# Patient Record
Sex: FEMALE | Race: WHITE | Employment: OTHER | URBAN - METROPOLITAN AREA
[De-identification: names, ages, dates, MRNs, and addresses within clinical notes are randomized per-mention and may not be internally consistent; named-entity substitution may affect disease eponyms.]

---

## 2017-04-11 ENCOUNTER — APPOINTMENT (OUTPATIENT)
Dept: CT IMAGING | Age: 82
DRG: 395 | End: 2017-04-11
Attending: STUDENT IN AN ORGANIZED HEALTH CARE EDUCATION/TRAINING PROGRAM
Payer: MEDICARE

## 2017-04-11 ENCOUNTER — HOSPITAL ENCOUNTER (INPATIENT)
Age: 82
LOS: 3 days | Discharge: HOME | DRG: 395 | End: 2017-04-14
Attending: STUDENT IN AN ORGANIZED HEALTH CARE EDUCATION/TRAINING PROGRAM | Admitting: FAMILY MEDICINE
Payer: MEDICARE

## 2017-04-11 DIAGNOSIS — K52.9 ACUTE COLITIS: Primary | ICD-10-CM

## 2017-04-11 LAB
ABO + RH BLD: NORMAL
ALBUMIN SERPL BCP-MCNC: 3.5 G/DL (ref 3.5–5)
ALBUMIN/GLOB SERPL: 1.1 {RATIO} (ref 1.1–2.2)
ALP SERPL-CCNC: 88 U/L (ref 45–117)
ALT SERPL-CCNC: 19 U/L (ref 12–78)
ANION GAP BLD CALC-SCNC: 9 MMOL/L (ref 5–15)
APPEARANCE UR: CLEAR
APTT PPP: 28 SEC (ref 22.1–32.5)
AST SERPL W P-5'-P-CCNC: 19 U/L (ref 15–37)
BACTERIA URNS QL MICRO: NEGATIVE /HPF
BASOPHILS # BLD AUTO: 0 K/UL (ref 0–0.1)
BASOPHILS # BLD: 0 % (ref 0–1)
BILIRUB SERPL-MCNC: 0.7 MG/DL (ref 0.2–1)
BILIRUB UR QL: NEGATIVE
BLOOD GROUP ANTIBODIES SERPL: NORMAL
BUN SERPL-MCNC: 14 MG/DL (ref 6–20)
BUN/CREAT SERPL: 18 (ref 12–20)
CALCIUM SERPL-MCNC: 8.9 MG/DL (ref 8.5–10.1)
CHLORIDE SERPL-SCNC: 102 MMOL/L (ref 97–108)
CO2 SERPL-SCNC: 25 MMOL/L (ref 21–32)
COLOR UR: ABNORMAL
CREAT SERPL-MCNC: 0.77 MG/DL (ref 0.55–1.02)
EOSINOPHIL # BLD: 0.2 K/UL (ref 0–0.4)
EOSINOPHIL NFR BLD: 1 % (ref 0–7)
EPITH CASTS URNS QL MICRO: ABNORMAL /LPF
ERYTHROCYTE [DISTWIDTH] IN BLOOD BY AUTOMATED COUNT: 12.7 % (ref 11.5–14.5)
GLOBULIN SER CALC-MCNC: 3.1 G/DL (ref 2–4)
GLUCOSE SERPL-MCNC: 98 MG/DL (ref 65–100)
GLUCOSE UR STRIP.AUTO-MCNC: NEGATIVE MG/DL
HCT VFR BLD AUTO: 38.2 % (ref 35–47)
HGB BLD-MCNC: 12.7 G/DL (ref 11.5–16)
HGB UR QL STRIP: NEGATIVE
INR PPP: 1 (ref 0.9–1.1)
KETONES UR QL STRIP.AUTO: NEGATIVE MG/DL
LACTATE SERPL-SCNC: 1.1 MMOL/L (ref 0.4–2)
LEUKOCYTE ESTERASE UR QL STRIP.AUTO: ABNORMAL
LIPASE SERPL-CCNC: 113 U/L (ref 73–393)
LYMPHOCYTES # BLD AUTO: 16 % (ref 12–49)
LYMPHOCYTES # BLD: 2 K/UL (ref 0.8–3.5)
MCH RBC QN AUTO: 29.8 PG (ref 26–34)
MCHC RBC AUTO-ENTMCNC: 33.2 G/DL (ref 30–36.5)
MCV RBC AUTO: 89.7 FL (ref 80–99)
MONOCYTES # BLD: 0.8 K/UL (ref 0–1)
MONOCYTES NFR BLD AUTO: 7 % (ref 5–13)
NEUTS SEG # BLD: 9.1 K/UL (ref 1.8–8)
NEUTS SEG NFR BLD AUTO: 76 % (ref 32–75)
NITRITE UR QL STRIP.AUTO: NEGATIVE
PH UR STRIP: 6.5 [PH] (ref 5–8)
PLATELET # BLD AUTO: 231 K/UL (ref 150–400)
POTASSIUM SERPL-SCNC: 3.4 MMOL/L (ref 3.5–5.1)
PROT SERPL-MCNC: 6.6 G/DL (ref 6.4–8.2)
PROT UR STRIP-MCNC: NEGATIVE MG/DL
PROTHROMBIN TIME: 10.7 SEC (ref 9–11.1)
RBC # BLD AUTO: 4.26 M/UL (ref 3.8–5.2)
RBC #/AREA URNS HPF: ABNORMAL /HPF (ref 0–5)
SODIUM SERPL-SCNC: 136 MMOL/L (ref 136–145)
SP GR UR REFRACTOMETRY: 1.01 (ref 1–1.03)
SPECIMEN EXP DATE BLD: NORMAL
THERAPEUTIC RANGE,PTTT: NORMAL SECS (ref 58–77)
UROBILINOGEN UR QL STRIP.AUTO: 0.2 EU/DL (ref 0.2–1)
WBC # BLD AUTO: 12.1 K/UL (ref 3.6–11)
WBC URNS QL MICRO: ABNORMAL /HPF (ref 0–4)

## 2017-04-11 PROCEDURE — 81001 URINALYSIS AUTO W/SCOPE: CPT | Performed by: STUDENT IN AN ORGANIZED HEALTH CARE EDUCATION/TRAINING PROGRAM

## 2017-04-11 PROCEDURE — 85610 PROTHROMBIN TIME: CPT | Performed by: STUDENT IN AN ORGANIZED HEALTH CARE EDUCATION/TRAINING PROGRAM

## 2017-04-11 PROCEDURE — 86900 BLOOD TYPING SEROLOGIC ABO: CPT | Performed by: STUDENT IN AN ORGANIZED HEALTH CARE EDUCATION/TRAINING PROGRAM

## 2017-04-11 PROCEDURE — 99285 EMERGENCY DEPT VISIT HI MDM: CPT

## 2017-04-11 PROCEDURE — 74177 CT ABD & PELVIS W/CONTRAST: CPT

## 2017-04-11 PROCEDURE — 74011636320 HC RX REV CODE- 636/320: Performed by: STUDENT IN AN ORGANIZED HEALTH CARE EDUCATION/TRAINING PROGRAM

## 2017-04-11 PROCEDURE — 96376 TX/PRO/DX INJ SAME DRUG ADON: CPT

## 2017-04-11 PROCEDURE — 83690 ASSAY OF LIPASE: CPT | Performed by: STUDENT IN AN ORGANIZED HEALTH CARE EDUCATION/TRAINING PROGRAM

## 2017-04-11 PROCEDURE — 87077 CULTURE AEROBIC IDENTIFY: CPT | Performed by: FAMILY MEDICINE

## 2017-04-11 PROCEDURE — 85025 COMPLETE CBC W/AUTO DIFF WBC: CPT | Performed by: STUDENT IN AN ORGANIZED HEALTH CARE EDUCATION/TRAINING PROGRAM

## 2017-04-11 PROCEDURE — 74011250636 HC RX REV CODE- 250/636

## 2017-04-11 PROCEDURE — 36415 COLL VENOUS BLD VENIPUNCTURE: CPT | Performed by: STUDENT IN AN ORGANIZED HEALTH CARE EDUCATION/TRAINING PROGRAM

## 2017-04-11 PROCEDURE — 85730 THROMBOPLASTIN TIME PARTIAL: CPT | Performed by: STUDENT IN AN ORGANIZED HEALTH CARE EDUCATION/TRAINING PROGRAM

## 2017-04-11 PROCEDURE — 74011000258 HC RX REV CODE- 258: Performed by: STUDENT IN AN ORGANIZED HEALTH CARE EDUCATION/TRAINING PROGRAM

## 2017-04-11 PROCEDURE — 74011250636 HC RX REV CODE- 250/636: Performed by: FAMILY MEDICINE

## 2017-04-11 PROCEDURE — 96361 HYDRATE IV INFUSION ADD-ON: CPT

## 2017-04-11 PROCEDURE — 74011250636 HC RX REV CODE- 250/636: Performed by: STUDENT IN AN ORGANIZED HEALTH CARE EDUCATION/TRAINING PROGRAM

## 2017-04-11 PROCEDURE — 74011000250 HC RX REV CODE- 250: Performed by: FAMILY MEDICINE

## 2017-04-11 PROCEDURE — 83605 ASSAY OF LACTIC ACID: CPT | Performed by: STUDENT IN AN ORGANIZED HEALTH CARE EDUCATION/TRAINING PROGRAM

## 2017-04-11 PROCEDURE — 96375 TX/PRO/DX INJ NEW DRUG ADDON: CPT

## 2017-04-11 PROCEDURE — 96374 THER/PROPH/DIAG INJ IV PUSH: CPT

## 2017-04-11 PROCEDURE — 87045 FECES CULTURE AEROBIC BACT: CPT | Performed by: FAMILY MEDICINE

## 2017-04-11 PROCEDURE — 74011250637 HC RX REV CODE- 250/637: Performed by: FAMILY MEDICINE

## 2017-04-11 PROCEDURE — 87493 C DIFF AMPLIFIED PROBE: CPT | Performed by: FAMILY MEDICINE

## 2017-04-11 PROCEDURE — 80053 COMPREHEN METABOLIC PANEL: CPT | Performed by: STUDENT IN AN ORGANIZED HEALTH CARE EDUCATION/TRAINING PROGRAM

## 2017-04-11 PROCEDURE — 65660000000 HC RM CCU STEPDOWN

## 2017-04-11 RX ORDER — POLYETHYLENE GLYCOL 3350 17 G/17G
17 POWDER, FOR SOLUTION ORAL AS NEEDED
COMMUNITY
End: 2017-04-14

## 2017-04-11 RX ORDER — FLECAINIDE ACETATE 100 MG/1
100 TABLET ORAL 2 TIMES DAILY
COMMUNITY
End: 2020-02-03

## 2017-04-11 RX ORDER — AMLODIPINE BESYLATE 5 MG/1
2.5 TABLET ORAL AS NEEDED
Status: DISCONTINUED | OUTPATIENT
Start: 2017-04-11 | End: 2017-04-11

## 2017-04-11 RX ORDER — MORPHINE SULFATE 2 MG/ML
2 INJECTION, SOLUTION INTRAMUSCULAR; INTRAVENOUS ONCE
Status: COMPLETED | OUTPATIENT
Start: 2017-04-11 | End: 2017-04-11

## 2017-04-11 RX ORDER — PANTOPRAZOLE SODIUM 40 MG/1
40 TABLET, DELAYED RELEASE ORAL
Status: DISCONTINUED | OUTPATIENT
Start: 2017-04-12 | End: 2017-04-12

## 2017-04-11 RX ORDER — SODIUM CHLORIDE 0.9 % (FLUSH) 0.9 %
5-10 SYRINGE (ML) INJECTION EVERY 8 HOURS
Status: DISCONTINUED | OUTPATIENT
Start: 2017-04-11 | End: 2017-04-14 | Stop reason: HOSPADM

## 2017-04-11 RX ORDER — DIAZEPAM 2 MG/1
2 TABLET ORAL DAILY PRN
Status: DISCONTINUED | OUTPATIENT
Start: 2017-04-11 | End: 2017-04-14 | Stop reason: HOSPADM

## 2017-04-11 RX ORDER — ENOXAPARIN SODIUM 100 MG/ML
40 INJECTION SUBCUTANEOUS EVERY 24 HOURS
Status: DISCONTINUED | OUTPATIENT
Start: 2017-04-11 | End: 2017-04-12

## 2017-04-11 RX ORDER — DOXAZOSIN 1 MG/1
0.5 TABLET ORAL
Status: DISCONTINUED | OUTPATIENT
Start: 2017-04-11 | End: 2017-04-14 | Stop reason: HOSPADM

## 2017-04-11 RX ORDER — FLECAINIDE ACETATE 100 MG/1
100 TABLET ORAL 2 TIMES DAILY
Status: DISCONTINUED | OUTPATIENT
Start: 2017-04-11 | End: 2017-04-14 | Stop reason: HOSPADM

## 2017-04-11 RX ORDER — METRONIDAZOLE 500 MG/100ML
500 INJECTION, SOLUTION INTRAVENOUS EVERY 8 HOURS
Status: DISCONTINUED | OUTPATIENT
Start: 2017-04-11 | End: 2017-04-13 | Stop reason: DRUGHIGH

## 2017-04-11 RX ORDER — TEMAZEPAM 15 MG/1
15 CAPSULE ORAL
Status: DISCONTINUED | OUTPATIENT
Start: 2017-04-11 | End: 2017-04-11

## 2017-04-11 RX ORDER — AMLODIPINE BESYLATE 5 MG/1
2.5 TABLET ORAL DAILY PRN
Status: DISCONTINUED | OUTPATIENT
Start: 2017-04-11 | End: 2017-04-14 | Stop reason: HOSPADM

## 2017-04-11 RX ORDER — ASPIRIN 81 MG/1
81 TABLET ORAL DAILY
Status: DISCONTINUED | OUTPATIENT
Start: 2017-04-12 | End: 2017-04-14 | Stop reason: HOSPADM

## 2017-04-11 RX ORDER — SODIUM CHLORIDE 0.9 % (FLUSH) 0.9 %
10 SYRINGE (ML) INJECTION
Status: COMPLETED | OUTPATIENT
Start: 2017-04-11 | End: 2017-04-11

## 2017-04-11 RX ORDER — ONDANSETRON 2 MG/ML
4 INJECTION INTRAMUSCULAR; INTRAVENOUS
Status: COMPLETED | OUTPATIENT
Start: 2017-04-11 | End: 2017-04-11

## 2017-04-11 RX ORDER — SODIUM CHLORIDE 0.9 % (FLUSH) 0.9 %
5-10 SYRINGE (ML) INJECTION AS NEEDED
Status: DISCONTINUED | OUTPATIENT
Start: 2017-04-11 | End: 2017-04-14 | Stop reason: HOSPADM

## 2017-04-11 RX ORDER — AMLODIPINE BESYLATE 5 MG/1
5 TABLET ORAL
Status: DISCONTINUED | OUTPATIENT
Start: 2017-04-11 | End: 2017-04-14 | Stop reason: HOSPADM

## 2017-04-11 RX ORDER — ONDANSETRON 2 MG/ML
INJECTION INTRAMUSCULAR; INTRAVENOUS
Status: COMPLETED
Start: 2017-04-11 | End: 2017-04-11

## 2017-04-11 RX ORDER — LISINOPRIL 20 MG/1
20 TABLET ORAL DAILY
Status: DISCONTINUED | OUTPATIENT
Start: 2017-04-12 | End: 2017-04-14 | Stop reason: HOSPADM

## 2017-04-11 RX ORDER — SODIUM CHLORIDE 9 MG/ML
25 INJECTION, SOLUTION INTRAVENOUS CONTINUOUS
Status: DISCONTINUED | OUTPATIENT
Start: 2017-04-11 | End: 2017-04-14 | Stop reason: HOSPADM

## 2017-04-11 RX ORDER — AMLODIPINE BESYLATE 2.5 MG/1
2.5 TABLET ORAL AS NEEDED
COMMUNITY
End: 2018-03-19 | Stop reason: SDUPTHER

## 2017-04-11 RX ORDER — HYDROCODONE BITARTRATE AND ACETAMINOPHEN 5; 325 MG/1; MG/1
1 TABLET ORAL
Status: DISCONTINUED | OUTPATIENT
Start: 2017-04-11 | End: 2017-04-14 | Stop reason: HOSPADM

## 2017-04-11 RX ADMIN — SODIUM CHLORIDE 75 ML/HR: 900 INJECTION, SOLUTION INTRAVENOUS at 19:17

## 2017-04-11 RX ADMIN — FLECAINIDE ACETATE 100 MG: 100 TABLET ORAL at 20:44

## 2017-04-11 RX ADMIN — SODIUM CHLORIDE 100 ML: 900 INJECTION, SOLUTION INTRAVENOUS at 12:46

## 2017-04-11 RX ADMIN — METRONIDAZOLE 500 MG: 500 INJECTION, SOLUTION INTRAVENOUS at 20:44

## 2017-04-11 RX ADMIN — Medication 10 ML: at 19:38

## 2017-04-11 RX ADMIN — IOHEXOL 50 ML: 240 INJECTION, SOLUTION INTRATHECAL; INTRAVASCULAR; INTRAVENOUS; ORAL at 08:57

## 2017-04-11 RX ADMIN — ONDANSETRON 4 MG: 2 INJECTION INTRAMUSCULAR; INTRAVENOUS at 19:38

## 2017-04-11 RX ADMIN — ONDANSETRON 4 MG: 2 INJECTION INTRAMUSCULAR; INTRAVENOUS at 10:34

## 2017-04-11 RX ADMIN — Medication 10 ML: at 19:17

## 2017-04-11 RX ADMIN — ENOXAPARIN SODIUM 40 MG: 40 INJECTION SUBCUTANEOUS at 19:38

## 2017-04-11 RX ADMIN — SODIUM CHLORIDE 500 ML: 900 INJECTION, SOLUTION INTRAVENOUS at 08:40

## 2017-04-11 RX ADMIN — DOXAZOSIN 0.5 MG: 1 TABLET ORAL at 23:10

## 2017-04-11 RX ADMIN — DIAZEPAM 2 MG: 2 TABLET ORAL at 23:10

## 2017-04-11 RX ADMIN — AMLODIPINE BESYLATE 5 MG: 5 TABLET ORAL at 23:10

## 2017-04-11 RX ADMIN — ONDANSETRON 4 MG: 2 INJECTION INTRAMUSCULAR; INTRAVENOUS at 08:40

## 2017-04-11 RX ADMIN — Medication 2 MG: at 10:34

## 2017-04-11 RX ADMIN — Medication 2 MG: at 08:40

## 2017-04-11 RX ADMIN — HYDROCODONE BITARTRATE AND ACETAMINOPHEN 1 TABLET: 5; 325 TABLET ORAL at 20:44

## 2017-04-11 RX ADMIN — Medication 10 ML: at 12:46

## 2017-04-11 RX ADMIN — IOPAMIDOL 100 ML: 755 INJECTION, SOLUTION INTRAVENOUS at 12:46

## 2017-04-11 NOTE — IP AVS SNAPSHOT
[address] [place] [address] 
[phone] Patient: Miroslava Peck MRN: [id number] :8/10/1926 You are allergic to the following Allergen Reactions Beta Blocker (Beta-Blockers (Beta-Adrenergic Blocking Agts)) Other (comments) Sick sinus syndrome Codeine Nausea Only Donnatal (Phenobarb-Hyoscy-Atropine-Scop) Unknown (comments) Exelon (Rivastigmine) Other (comments) Nausea Plavix (Clopidogrel) Rash Sular (Nisoldipine) Vertigo Recent Documentation Height Weight BMI OB Status Smoking Status 1.651 m 54.4 kg 19.97 kg/m2 Hysterectomy Never Smoker Emergency Contacts Name Discharge Info Relation Home Work Mobile Larry([facility])Salma DISCHARGE CAREGIVER [3] Other Relative [6] [phone] Misha,(Dtr) Naveed Bhatt CAREGIVER [3] Child [2] Liv,(Son)Hiren  Child [2] Salma Juarez  Other Relative [6] [phone] About your hospitalization You were admitted on:  2017 You last received care in the:  [place] [id number] You were discharged on:  2017 Unit phone number:  [phone] Why you were hospitalized Your primary diagnosis was:  Not on File Your diagnoses also included:  Ischemic Colitis (Hcc), Labile Essential Hypertension, Pvcs (Premature Ventricular Contractions) Providers Seen During Your Hospitalizations Provider Role Specialty Primary office phone Antonella Patel MD Attending Provider Emergency Medicine [phone] Norman Prasad MD Attending Provider Internal Medicine [phone] Your Primary Care Physician (PCP) Primary Care Physician Office Phone Office Fax [address] [phone] Follow-up Information Follow up With Details Comments Contact Info Norman Prasad MD   [address] [facility] 
[phone] Your Appointments Thursday April 20, 2017  2:15 PM EDT  
ESTABLISHED PATIENT with MD Warren [address], [address] [facility] [address] ([address]) [address] [facility]  
[phone] Current Discharge Medication List  
  
START taking these medications Dose & Instructions Dispensing Information Comments Morning Noon Evening Bedtime  
 metroNIDAZOLE 500 mg tablet Commonly known as:  FLAGYL Your last dose was: Your next dose is:    
   
   
 Dose:  500 mg Take 1 Tab by mouth three (3) times daily. Quantity:  20 Tab Refills:  0 CONTINUE these medications which have NOT CHANGED Dose & Instructions Dispensing Information Comments Morning Noon Evening Bedtime CALCIUM 600 + D 600-125 mg-unit Tab Generic drug:  calcium-cholecalciferol (d3) Your last dose was: Your next dose is:    
   
   
 Dose:  1 Tab Take 1 Tab by mouth daily. Refills:  0 CENTRUM SILVER PO Your last dose was: Your next dose is:    
   
   
 Dose:  1 Tab Take 1 Tab by mouth daily. Refills:  0 CLARITIN 10 mg tablet Generic drug:  loratadine Your last dose was: Your next dose is:    
   
   
 Dose:  10 mg Take 10 mg by mouth daily as needed. Refills:  0  
     
   
   
   
  
 diazePAM 2 mg tablet Commonly known as:  VALIUM Your last dose was: Your next dose is: TAKE 1 TABLET BY MOUTH EVERY DAY AS NEEDED Quantity:  30 Tab Refills:  0  
     
   
   
   
  
 doxazosin 1 mg tablet Commonly known as:  CARDURA Your last dose was: Your next dose is:    
   
   
 1/2 tablet po qhs  
 Quantity:  45 Tab Refills:  PRN  
     
   
   
   
  
 flecainide 100 mg tablet Commonly known as:  TAMBOCOR Your last dose was: Your next dose is:    
   
   
 Dose:  100 mg Take 100 mg by mouth two (2) times a day. Refills:  0  
     
   
   
   
  
 FLONASE 50 mcg/actuation nasal spray Generic drug:  fluticasone Your last dose was: Your next dose is:    
   
   
 Dose:  2 Spray 2 Sprays by Both Nostrils route as needed for Rhinitis. Refills:  0 HYDROcodone-acetaminophen 5-325 mg per tablet Commonly known as:  Simmie Chader Your last dose was: Your next dose is:    
   
   
 Dose:  1 Tab Take 1 Tab by mouth two (2) times daily as needed for Pain. Max Daily Amount: 2 Tabs. Indications: Pain Quantity:  60 Tab Refills:  0  
     
   
   
   
  
 lisinopril 20 mg tablet Commonly known as:  Sydna Lies Your last dose was: Your next dose is: TAKE 1 TABLET DAILY Quantity:  90 Tab Refills:  3  
     
   
   
   
  
 lovastatin 20 mg tablet Commonly known as:  MEVACOR Your last dose was: Your next dose is: TAKE 1 TABLET DAILY Quantity:  90 Tab Refills:  0  
     
   
   
   
  
 * NORVASC 5 mg tablet Generic drug:  amLODIPine Your last dose was: Your next dose is:    
   
   
 Dose:  5 mg Take 5 mg by mouth nightly. Refills:  0  
     
   
   
   
  
 * NORVASC 2.5 mg tablet Generic drug:  amLODIPine Your last dose was: Your next dose is:    
   
   
 Dose:  2.5 mg Take 2.5 mg by mouth as needed (SBP > 160). Refills:  0  
     
   
   
   
  
 pantoprazole 40 mg tablet Commonly known as:  PROTONIX Your last dose was: Your next dose is: TAKE 1 TABLET DAILY IN PLACE OF OMEPRAZOLE Quantity:  90 Tab Refills:  1  
     
   
   
   
  
 * Notice: This list has 2 medication(s) that are the same as other medications prescribed for you. Read the directions carefully, and ask your doctor or other care provider to review them with you. STOP taking these medications   
 aspirin delayed-release 81 mg tablet MIRALAX 17 gram packet Generic drug:  polyethylene glycol  
   
  
 temazepam 15 mg capsule Commonly known as:  RESTORIL Where to Get Your Medications Information on where to get these meds will be given to you by the nurse or doctor. ! Ask your nurse or doctor about these medications  
  metroNIDAZOLE 500 mg tablet Discharge Instructions Patient Discharge Instructions Agustina Oneil / [id number] : 8/10/1926 Admitted 2017 Discharged: 2017 Take Home Medications · It is important that you take the medication exactly as they are prescribed. · Keep your medication in the bottles provided by the pharmacist and keep a list of the medication names, dosages, and times to be taken in your wallet. · Do not take other medications without consulting your doctor. What to do at [facility] Recommended diet: soft Recommended activity: walk carefully. No driving a car for now  Rest. OK to go to [facility] If you experience any of the following symptoms : bad abdominal pain , rectal bleeding, please follow up with Dr. Deb Steele at [phone] Follow-up with Dr. Lexa Novak in the office in 1 week's time or sooner as needed. Call for an appointment Information obtained by : 
I understand that if any problems occur once I am at home I am to contact my physician. I understand and acknowledge receipt of the instructions indicated above. Physician's or R.N.'s Signature                                                                  Date/Time Patient or Representative Signature                                                          Date/Time Discharge Orders None [employer] Announcement We are excited to announce that we are making your provider's discharge notes available to you in [employer]. You will see these notes when they are completed and signed by the physician that discharged you from your recent hospital stay. If you have any questions or concerns about any information you see in [employer], please call the Health Information Department where you were seen or reach out to your Primary Care Provider for more information about your plan of care. Introducing [facility]! Ross Recinos introduces [employer] patient portal. Now you can access parts of your medical record, email your doctor's office, and request medication refills online. 1. In your internet browser, go to [email or web address] 2. Click on the First Time User? Click Here link in the Sign In box. You will see the New Member Sign Up page. 3. Enter your [employer] Access Code exactly as it appears below. You will not need to use this code after youve completed the sign-up process. If you do not sign up before the expiration date, you must request a new code. · [employer] Access Code: [email or web address] Expires: 7/5/2017  1:22 PM 
 
4. Enter the last four digits of your Social Security Number (xxxx) and Date of Birth (mm/dd/yyyy) as indicated and click Submit. You will be taken to the next sign-up page. 5. Create a [employer] ID. This will be your [employer] login ID and cannot be changed, so think of one that is secure and easy to remember. 6. Create a [employer] password. You can change your password at any time. 7. Enter your Password Reset Question and Answer. This can be used at a later time if you forget your password. 8. Enter your e-mail address. You will receive e-mail notification when new information is available in [address] 9. Click Sign Up. You can now view and download portions of your medical record. 10. Click the Download Summary menu link to download a portable copy of your medical information. If you have questions, please visit the Frequently Asked Questions section of the [employer] website. Remember, [employer] is NOT to be used for urgent needs. For medical emergencies, dial 911. Now available from your iPhone and Android! General Information Please provide this summary of care documentation to your next provider. Patient Signature:  ____________________________________________________________ Date:  ____________________________________________________________  
  
Kb Torrey Provider Signature:  ____________________________________________________________ Date:  ____________________________________________________________

## 2017-04-11 NOTE — ROUTINE PROCESS
TRANSFER - OUT REPORT:    Verbal report given to Gilda Lang RN (name) on Amna Resides  being transferred to [address] [place] [address] (unit) for routine progression of care       Report consisted of patients Situation, Background, Assessment and   Recommendations(SBAR). Information from the following report(s) SBAR, ED Summary and MAR was reviewed with the receiving nurse. Lines:   Peripheral IV 04/11/17 Left Antecubital (Active)   Site Assessment Clean, dry, & intact 4/11/2017  9:03 AM   Phlebitis Assessment 0 4/11/2017  9:03 AM   Infiltration Assessment 0 4/11/2017  9:03 AM   Dressing Status Clean, dry, & intact 4/11/2017  9:03 AM   Dressing Type Transparent 4/11/2017  9:03 AM   Hub Color/Line Status Pink;Flushed;Patent 4/11/2017  9:03 AM        Opportunity for questions and clarification was provided.

## 2017-04-11 NOTE — PROGRESS NOTES
Admission Medication Reconciliation:    Information obtained from: Patient's grand daughter / Rx Query    Chief Complaint for this Admission:  Abdominal pain; Rectal bleeding    Allergies:  Beta blocker [beta-blockers (beta-adrenergic blocking agts)]; Codeine; Donnatal [phenobarb-hyoscy-atropine-scop]; Exelon [rivastigmine]; Plavix [clopidogrel]; and Sular [nisoldipine]    Comments/Recommendations: Discussed PTA medications with the patient. 1) Removed nalaxone, systanel, temazepam  2) Added calcium, amlodipine prn - has not used her prn amlodipine for a while  3) Patient's grand daughter seems to be a good historian and keeps track of the patient's medications    Prior to Admission Medications:   Prior to Admission Medications   Prescriptions Last Dose Informant Patient Reported? Taking? FOLIC ACID/MULTIVIT-MIN/LUTEIN (CENTRUM SILVER PO) 2017 at 0800  Yes Yes   Sig: Take 1 Tab by mouth daily. HYDROcodone-acetaminophen (NORCO) 5-325 mg per tablet 4/10/2017 at   No Yes   Sig: Take 1 Tab by mouth two (2) times daily as needed for Pain. Max Daily Amount: 2 Tabs. Indications: Pain   amLODIPine (NORVASC) 2.5 mg tablet   Yes Yes   Sig: Take 2.5 mg by mouth as needed (SBP > 160). amLODIPine (NORVASC) 5 mg tablet 2017 at 2000  Yes Yes   Sig: Take 5 mg by mouth nightly. aspirin delayed-release 81 mg tablet 2017 at 0800  Yes Yes   Sig: Take 81 mg by mouth daily. calcium-cholecalciferol, d3, (CALCIUM 600 + D) 600-125 mg-unit tab 2017  Yes Yes   Sig: Take 1 Tab by mouth daily. diazepam (VALIUM) 2 mg tablet 2017 at   No Yes   Sig: TAKE 1 TABLET BY MOUTH EVERY DAY AS NEEDED   doxazosin (CARDURA) 1 mg tablet 2017 at 2000  No Yes   Si/2 tablet po qhs   flecainide (TAMBOCOR) 100 mg tablet 2017 at 0715  Yes Yes   Sig: Take 100 mg by mouth two (2) times a day.    fluticasone (FLONASE) 50 mcg/actuation nasal spray 4/10/2017  Yes Yes   Si Sprays by Both Nostrils route as needed for Rhinitis. lisinopril (PRINIVIL, ZESTRIL) 20 mg tablet 4/8/2017 at 0800  No Yes   Sig: TAKE 1 TABLET DAILY   loratadine (CLARITIN) 10 mg tablet 4/8/2017 at 0800  Yes Yes   Sig: Take 10 mg by mouth daily as needed. lovastatin (MEVACOR) 20 mg tablet 4/9/2017 at 0800  No Yes   Sig: TAKE 1 TABLET DAILY   pantoprazole (PROTONIX) 40 mg tablet 4/8/2017 at 0800  No Yes   Sig: TAKE 1 TABLET DAILY IN PLACE OF OMEPRAZOLE   polyethylene glycol (MIRALAX) 17 gram packet   Yes Yes   Sig: Take 17 g by mouth as needed.                 Facility-Administered Medications: None

## 2017-04-11 NOTE — ED NOTES
Patient in room after CT exam and using bathroom (with assistance from her daughter). Patient reporting that she is now feeling nauseous again. Both RN and daughter got patient back in ED bed. Vital signs obtained. Patient given emesis bag, no vomiting noted. Will follow up with ER MD Dr. Marleny Abraham to make him aware. 1326: Dr. Marleny Abraham in room to speak with patient and family.

## 2017-04-11 NOTE — H&P
History and Physical    Subjective:   LAKEISHA Roca is a 80 y.o.  female who presents with H/O multiple admissions for colitis . Has had C. Diff in the past. Currently with 24 hours of lower mid abdominal pain with frequent nonbloody diarrhea. Also with some light headedness with stools. .   Past Medical History:   Diagnosis Date    Acute ischemic colitis ([facility]     Arrhythmia     Paroxysmal  Afib,  SSS    Carotid stenosis, bilateral     moderate , right >left    GERD (gastroesophageal reflux disease) 12/10/2011    HTN (hypertension) 12/8/2011    HX OTHER MEDICAL 2011    cardiac cath ;non obstructive CAD. Renal artery stents; open    HX OTHER MEDICAL 2011    LLQ abd. pain thought due to spasm.  Hypercholesterolemia     Hypertension     Osteoporosis     Osteoporosis 12/8/2011    PAF (paroxysmal atrial fibrillation) ([facility] 12/8/2011    Rhinitis 12/8/2011    [facility] 12/8/2011      Past Surgical History:   Procedure Laterality Date    ENDOSCOPY, COLON, DIAGNOSTIC  3/2/11    diverticulosis,hemorrhoids    HX CHOLECYSTECTOMY      HX HYSTERECTOMY      1960    HX OTHER SURGICAL      stent left leg    HX TONSILLECTOMY       Family History   Problem Relation Age of Onset    Hypertension Mother       Social History   Substance Use Topics    Smoking status: Never Smoker    Smokeless tobacco: Not on file    Alcohol use No       Prior to Admission medications    Medication Sig Start Date End Date Taking? Authorizing Provider   FOLIC ACID/MULTIVIT-MIN/LUTEIN (CENTRUM SILVER PO) Take 1 Tab by mouth daily. Yes Jaime Coker MD   polyethylene glycol (MIRALAX) 17 gram packet Take 17 g by mouth as needed. Yes Jaime Coker MD   calcium-cholecalciferol, d3, (CALCIUM 600 + D) 600-125 mg-unit tab Take 1 Tab by mouth daily. Yes Historical Provider   amLODIPine (NORVASC) 2.5 mg tablet Take 2.5 mg by mouth as needed (SBP > 160).    Yes Historical Provider   flecainide (TAMBOCOR) 100 mg tablet Take 100 mg by mouth two (2) times a day. Yes Historical Provider   doxazosin (CARDURA) 1 mg tablet 1/2 tablet po qhs 4/10/17  Yes Janes Lopez IV, MD   lovastatin (MEVACOR) 20 mg tablet TAKE 1 TABLET DAILY 4/8/17  Yes Janes Lopez IV, MD   HYDROcodone-acetaminophen [facility]) 5-325 mg per tablet Take 1 Tab by mouth two (2) times daily as needed for Pain. Max Daily Amount: 2 Tabs. Indications: Pain 4/6/17  Yes Janes Lopez IV, MD   pantoprazole (PROTONIX) 40 mg tablet TAKE 1 TABLET DAILY IN PLACE OF OMEPRAZOLE 2/21/17  Yes Janes Lopez IV, MD   diazepam (VALIUM) 2 mg tablet TAKE 1 TABLET BY MOUTH EVERY DAY AS NEEDED 8/3/16  Yes Janes Lopez IV, MD   lisinopril (PRINIVIL, ZESTRIL) 20 mg tablet TAKE 1 TABLET DAILY 4/3/16  Yes Janes Lopez IV, MD   aspirin delayed-release 81 mg tablet Take 81 mg by mouth daily. Yes Historical Provider   fluticasone (FLONASE) 50 mcg/actuation nasal spray 2 Sprays by Both Nostrils route as needed for Rhinitis. Yes Historical Provider   amLODIPine (NORVASC) 5 mg tablet Take 5 mg by mouth nightly. Yes Historical Provider   loratadine (CLARITIN) 10 mg tablet Take 10 mg by mouth daily as needed. Yes Historical Provider   temazepam (RESTORIL) 15 mg capsule Take 1 Cap by mouth nightly as needed for Sleep for up to 90 days.  Max Daily Amount: 15 mg. 3/10/15 6/8/15  Dennie Monarch, MD     Allergies   Allergen Reactions    Beta Blocker [Beta-Blockers (Beta-Adrenergic Blocking Agts)] Other (comments)     Sick sinus syndrome      Codeine Nausea Only    Donnatal [Phenobarb-Hyoscy-Atropine-Scop] Unknown (comments)    Exelon [Rivastigmine] Other (comments)     Nausea     Plavix [Clopidogrel] Rash    Sular [Nisoldipine] Vertigo      Health Maintenance   Topic Date Due    GLAUCOMA SCREENING Q2Y  08/10/1991    MEDICARE YEARLY EXAM  08/10/1991    DTaP/Tdap/Td series (1 - Tdap) 11/01/2004    INFLUENZA AGE 9 TO ADULT  08/01/2016    OSTEOPOROSIS SCREENING (DEXA)  Completed    ZOSTER VACCINE AGE 60>  Completed    Pneumococcal 65+ Low/Medium Risk  Completed       Review of Systems:  A comprehensive review of systems was negative except for that written in the History of Present Illness. Objective: Intake and Output:            Physical Exam:   Visit Vitals    /63    Pulse 66    Temp 97.9 °F (36.6 °C)    Resp 16    Ht 5' 5\" (1.651 m)    Wt 120 lb (54.4 kg)    SpO2 96%    BMI 19.97 kg/m2     Neck: supple, symmetrical, trachea midline, no adenopathy, thyroid: not enlarged, symmetric, no tenderness/mass/nodules, no carotid bruit and no JVD  Lungs: clear to auscultation bilaterally  Heart: regular rate and rhythm, S1, S2 normal, no murmur, click, rub or gallop  Abdomen: mild lower abdominal tenderness, + BS  Extremities: extremities normal, atraumatic, no cyanosis or edema  Neurologic: Grossly normal        Data Review:   Recent Results (from the past 24 hour(s))   CBC WITH AUTOMATED DIFF    Collection Time: 04/11/17  8:25 AM   Result Value Ref Range    WBC 12.1 (H) 3.6 - 11.0 K/uL    RBC 4.26 3.80 - 5.20 M/uL    HGB 12.7 11.5 - 16.0 g/dL    HCT 38.2 35.0 - 47.0 %    MCV 89.7 80.0 - 99.0 FL    MCH 29.8 26.0 - 34.0 PG    MCHC 33.2 30.0 - 36.5 g/dL    RDW 12.7 11.5 - 14.5 %    PLATELET 810 174 - 107 K/uL    NEUTROPHILS 76 (H) 32 - 75 %    LYMPHOCYTES 16 12 - 49 %    MONOCYTES 7 5 - 13 %    EOSINOPHILS 1 0 - 7 %    BASOPHILS 0 0 - 1 %    ABS. NEUTROPHILS 9.1 (H) 1.8 - 8.0 K/UL    ABS. LYMPHOCYTES 2.0 0.8 - 3.5 K/UL    ABS. MONOCYTES 0.8 0.0 - 1.0 K/UL    ABS. EOSINOPHILS 0.2 0.0 - 0.4 K/UL    ABS.  BASOPHILS 0.0 0.0 - 0.1 K/UL   TYPE & SCREEN    Collection Time: 04/11/17  8:25 AM   Result Value Ref Range    Crossmatch Expiration 04/14/2017     ABO/Rh(D) Anna Shyla POSITIVE     Antibody screen NEG    PROTHROMBIN TIME + INR    Collection Time: 04/11/17  8:25 AM   Result Value Ref Range    INR 1.0 0.9 - 1.1      Prothrombin time 10.7 9.0 - 11.1 sec   PTT    Collection Time: 04/11/17  8:25 AM   Result Value Ref Range    aPTT 28.0 22.1 - 32.5 sec    aPTT, therapeutic range     58.0 - 40.4 SECS   METABOLIC PANEL, COMPREHENSIVE    Collection Time: 04/11/17  8:25 AM   Result Value Ref Range    Sodium 136 136 - 145 mmol/L    Potassium 3.4 (L) 3.5 - 5.1 mmol/L    Chloride 102 97 - 108 mmol/L    CO2 25 21 - 32 mmol/L    Anion gap 9 5 - 15 mmol/L    Glucose 98 65 - 100 mg/dL    BUN 14 6 - 20 MG/DL    Creatinine 0.77 0.55 - 1.02 MG/DL    BUN/Creatinine ratio 18 12 - 20      GFR est AA >60 >60 ml/min/1.73m2    GFR est non-AA >60 >60 ml/min/1.73m2    Calcium 8.9 8.5 - 10.1 MG/DL    Bilirubin, total 0.7 0.2 - 1.0 MG/DL    ALT (SGPT) 19 12 - 78 U/L    AST (SGOT) 19 15 - 37 U/L    Alk. phosphatase 88 45 - 117 U/L    Protein, total 6.6 6.4 - 8.2 g/dL    Albumin 3.5 3.5 - 5.0 g/dL    Globulin 3.1 2.0 - 4.0 g/dL    A-G Ratio 1.1 1.1 - 2.2     LIPASE    Collection Time: 04/11/17  8:25 AM   Result Value Ref Range    Lipase 113 73 - 393 U/L   LACTIC ACID, PLASMA    Collection Time: 04/11/17  9:40 AM   Result Value Ref Range    Lactic acid 1.1 0.4 - 2.0 MMOL/L   URINALYSIS W/ RFLX MICROSCOPIC    Collection Time: 04/11/17  1:03 PM   Result Value Ref Range    Color YELLOW/STRAW      Appearance CLEAR CLEAR      Specific gravity 1.012 1.003 - 1.030      pH (UA) 6.5 5.0 - 8.0      Protein NEGATIVE  NEG mg/dL    Glucose NEGATIVE  NEG mg/dL    Ketone NEGATIVE  NEG mg/dL    Bilirubin NEGATIVE  NEG      Blood NEGATIVE  NEG      Urobilinogen 0.2 0.2 - 1.0 EU/dL    Nitrites NEGATIVE  NEG      Leukocyte Esterase SMALL (A) NEG      WBC 0-4 0 - 4 /hpf    RBC 0-5 0 - 5 /hpf    Epithelial cells FEW FEW /lpf    Bacteria NEGATIVE  NEG /hpf       CT abd w contrast- Colonic wall thickening is compatible with colitis, either infectious,  inflammatory, or ischemic in etiology. Unchanged mild biliary dilatation  compatible with previous cholecystectomy.     Assessment:     Active Problems: Acute colitis (10/14/2015)        Plan:     1) IV hydration  2) IV Flagyl  3) clear liquids  4) stool for C diff  5) GI and DVT prophylaxis    Signed By: Tania Sandoval MD     April 11, 2017

## 2017-04-11 NOTE — ED NOTES
Attempted to medicate patient. Patients daughter on phone with her daughter who is a nurse. This RN spoke with grand daughter (?) on phone who states that patient has C. Diff when she takes the typical colitis antibiotics. States Claudia Hemanth works for her is fluids, IV Flagyl and oxygen. This helps to rest her bowel. \" Dr. Sho Neff made aware.

## 2017-04-12 LAB
ANION GAP BLD CALC-SCNC: 11 MMOL/L (ref 5–15)
BASOPHILS # BLD AUTO: 0 K/UL (ref 0–0.1)
BASOPHILS # BLD: 0 % (ref 0–1)
BUN SERPL-MCNC: 11 MG/DL (ref 6–20)
BUN/CREAT SERPL: 20 (ref 12–20)
C DIFF TOX GENS STL QL NAA+PROBE: NEGATIVE
CALCIUM SERPL-MCNC: 8.1 MG/DL (ref 8.5–10.1)
CHLORIDE SERPL-SCNC: 107 MMOL/L (ref 97–108)
CO2 SERPL-SCNC: 24 MMOL/L (ref 21–32)
CREAT SERPL-MCNC: 0.56 MG/DL (ref 0.55–1.02)
EOSINOPHIL # BLD: 0.1 K/UL (ref 0–0.4)
EOSINOPHIL NFR BLD: 1 % (ref 0–7)
ERYTHROCYTE [DISTWIDTH] IN BLOOD BY AUTOMATED COUNT: 12.8 % (ref 11.5–14.5)
GLUCOSE SERPL-MCNC: 76 MG/DL (ref 65–100)
HCT VFR BLD AUTO: 33.6 % (ref 35–47)
HGB BLD-MCNC: 11 G/DL (ref 11.5–16)
LYMPHOCYTES # BLD AUTO: 19 % (ref 12–49)
LYMPHOCYTES # BLD: 1.7 K/UL (ref 0.8–3.5)
MAGNESIUM SERPL-MCNC: 1.9 MG/DL (ref 1.6–2.4)
MCH RBC QN AUTO: 29.3 PG (ref 26–34)
MCHC RBC AUTO-ENTMCNC: 32.7 G/DL (ref 30–36.5)
MCV RBC AUTO: 89.6 FL (ref 80–99)
MONOCYTES # BLD: 0.6 K/UL (ref 0–1)
MONOCYTES NFR BLD AUTO: 6 % (ref 5–13)
NEUTS SEG # BLD: 6.7 K/UL (ref 1.8–8)
NEUTS SEG NFR BLD AUTO: 74 % (ref 32–75)
PLATELET # BLD AUTO: 201 K/UL (ref 150–400)
POTASSIUM SERPL-SCNC: 3.2 MMOL/L (ref 3.5–5.1)
RBC # BLD AUTO: 3.75 M/UL (ref 3.8–5.2)
SODIUM SERPL-SCNC: 142 MMOL/L (ref 136–145)
WBC # BLD AUTO: 9.1 K/UL (ref 3.6–11)

## 2017-04-12 PROCEDURE — 77010033678 HC OXYGEN DAILY

## 2017-04-12 PROCEDURE — 74011000250 HC RX REV CODE- 250: Performed by: FAMILY MEDICINE

## 2017-04-12 PROCEDURE — 36415 COLL VENOUS BLD VENIPUNCTURE: CPT | Performed by: FAMILY MEDICINE

## 2017-04-12 PROCEDURE — 65660000000 HC RM CCU STEPDOWN

## 2017-04-12 PROCEDURE — 85025 COMPLETE CBC W/AUTO DIFF WBC: CPT | Performed by: FAMILY MEDICINE

## 2017-04-12 PROCEDURE — 80048 BASIC METABOLIC PNL TOTAL CA: CPT | Performed by: FAMILY MEDICINE

## 2017-04-12 PROCEDURE — 74011250636 HC RX REV CODE- 250/636: Performed by: FAMILY MEDICINE

## 2017-04-12 PROCEDURE — 83735 ASSAY OF MAGNESIUM: CPT | Performed by: INTERNAL MEDICINE

## 2017-04-12 PROCEDURE — 74011250637 HC RX REV CODE- 250/637: Performed by: FAMILY MEDICINE

## 2017-04-12 PROCEDURE — 74011250637 HC RX REV CODE- 250/637: Performed by: INTERNAL MEDICINE

## 2017-04-12 RX ORDER — FAMOTIDINE 20 MG/1
20 TABLET, FILM COATED ORAL 2 TIMES DAILY
Status: DISCONTINUED | OUTPATIENT
Start: 2017-04-12 | End: 2017-04-14 | Stop reason: HOSPADM

## 2017-04-12 RX ORDER — POTASSIUM CHLORIDE 750 MG/1
30 TABLET, FILM COATED, EXTENDED RELEASE ORAL 2 TIMES DAILY
Status: DISCONTINUED | OUTPATIENT
Start: 2017-04-12 | End: 2017-04-13

## 2017-04-12 RX ADMIN — POTASSIUM CHLORIDE 30 MEQ: 750 TABLET, FILM COATED, EXTENDED RELEASE ORAL at 09:39

## 2017-04-12 RX ADMIN — Medication 10 ML: at 13:23

## 2017-04-12 RX ADMIN — FLECAINIDE ACETATE 100 MG: 100 TABLET ORAL at 17:33

## 2017-04-12 RX ADMIN — FAMOTIDINE 20 MG: 20 TABLET ORAL at 18:00

## 2017-04-12 RX ADMIN — DIAZEPAM 2 MG: 2 TABLET ORAL at 21:44

## 2017-04-12 RX ADMIN — LISINOPRIL 20 MG: 20 TABLET ORAL at 09:39

## 2017-04-12 RX ADMIN — METRONIDAZOLE 500 MG: 500 INJECTION, SOLUTION INTRAVENOUS at 11:09

## 2017-04-12 RX ADMIN — FAMOTIDINE 20 MG: 20 TABLET ORAL at 09:40

## 2017-04-12 RX ADMIN — PANTOPRAZOLE SODIUM 40 MG: 40 TABLET, DELAYED RELEASE ORAL at 07:07

## 2017-04-12 RX ADMIN — HYDROCODONE BITARTRATE AND ACETAMINOPHEN 1 TABLET: 5; 325 TABLET ORAL at 02:32

## 2017-04-12 RX ADMIN — ASPIRIN 81 MG: 81 TABLET, COATED ORAL at 09:40

## 2017-04-12 RX ADMIN — HYDROCODONE BITARTRATE AND ACETAMINOPHEN 1 TABLET: 5; 325 TABLET ORAL at 11:08

## 2017-04-12 RX ADMIN — FLECAINIDE ACETATE 100 MG: 100 TABLET ORAL at 09:39

## 2017-04-12 RX ADMIN — DOXAZOSIN 0.5 MG: 1 TABLET ORAL at 21:44

## 2017-04-12 RX ADMIN — AMLODIPINE BESYLATE 5 MG: 5 TABLET ORAL at 21:45

## 2017-04-12 RX ADMIN — METRONIDAZOLE 500 MG: 500 INJECTION, SOLUTION INTRAVENOUS at 20:01

## 2017-04-12 RX ADMIN — SODIUM CHLORIDE 75 ML/HR: 900 INJECTION, SOLUTION INTRAVENOUS at 11:09

## 2017-04-12 RX ADMIN — AMLODIPINE BESYLATE 2.5 MG: 5 TABLET ORAL at 09:42

## 2017-04-12 RX ADMIN — POTASSIUM CHLORIDE 30 MEQ: 750 TABLET, FILM COATED, EXTENDED RELEASE ORAL at 17:33

## 2017-04-12 RX ADMIN — METRONIDAZOLE 500 MG: 500 INJECTION, SOLUTION INTRAVENOUS at 02:32

## 2017-04-12 NOTE — CONSULTS
Colon and Rectal Surgery History and Physical    Subjective: Jennifer Luis is a 80 y.o. female who has a hx of abdominal pain and rectal bleeding. She has had abdominal pain in the past but this was the first episode associate with rectal bleeding. She had a ct scan showing colitis. Cdiff has been negative so far.  She feels much better today and has not had any bleeding today    Patient Active Problem List    Diagnosis Date Noted    Acute colitis 10/14/2015    Vasovagal near syncope 10/14/2015    Non-sustained ventricular tachycardia ([address] 10/14/2015    UTI (lower urinary tract infection) 10/14/2015    Unsteady gait 10/14/2015    VBI (vertebrobasilar insufficiency) 10/14/2015    Labile essential hypertension 10/14/2015    Functional gait abnormality 10/14/2015    Fever and chills 04/28/2015    Acute respiratory failure with hypoxemia ([address] 04/28/2015    Thrush, oral 03/07/2015    Left sided colitis with rectal bleeding (HCC) 03/05/2015    Diarrhea 03/04/2015    Colitis 03/04/2015    Volume depletion, gastrointestinal loss 03/04/2015    Abdominal pain, acute, generalized 03/04/2015    Diverticulitis of colon (without mention of hemorrhage) 10/22/2013    Ischemic colitis ([address] 10/21/2013    Gastritis 10/21/2013    Peripheral vascular disease ([address] 06/06/2012    Carotid stenosis, bilateral 12/10/2011    GERD (gastroesophageal reflux disease) 12/10/2011    Abdominal pain 12/10/2011    HTN (hypertension) 12/08/2011    SSS (sick sinus syndrome) ([address] 12/08/2011    PAF (paroxysmal atrial fibrillation) ([address] 12/08/2011    Hypercholesterolemia 12/08/2011    Osteoporosis 12/08/2011    Rhinitis 12/08/2011     Past Medical History:   Diagnosis Date    Acute ischemic colitis ([address]     Arrhythmia     Paroxysmal  Afib,  SSS    Carotid stenosis, bilateral     moderate , right >left    GERD (gastroesophageal reflux disease) 12/10/2011    HTN (hypertension) 12/8/2011    [address] [place] [address] 2011 cardiac cath ;non obstructive CAD. Renal artery stents; open    HX OTHER MEDICAL 2011    LLQ abd. pain thought due to spasm.  Hypercholesterolemia     Hypertension     Osteoporosis     Osteoporosis 12/8/2011    PAF (paroxysmal atrial fibrillation) ([facility] 12/8/2011    Rhinitis 12/8/2011    SSS (sick sinus syndrome) ([facility] 12/8/2011      Past Surgical History:   Procedure Laterality Date    ENDOSCOPY, COLON, DIAGNOSTIC  3/2/11    diverticulosis,hemorrhoids    HX CHOLECYSTECTOMY      HX HYSTERECTOMY      1960    HX OTHER SURGICAL      stent left leg    HX TONSILLECTOMY        Social History   Substance Use Topics    Smoking status: Never Smoker    Smokeless tobacco: Not on file    Alcohol use No      Family History   Problem Relation Age of Onset    Hypertension Mother       Prior to Admission medications    Medication Sig Start Date End Date Taking? Authorizing Provider   FOLIC ACID/MULTIVIT-MIN/LUTEIN (CENTRUM SILVER PO) Take 1 Tab by mouth daily. Yes Jamie Coker MD   polyethylene glycol (MIRALAX) 17 gram packet Take 17 g by mouth as needed. Yes Jaime Coker MD   calcium-cholecalciferol, d3, (CALCIUM 600 + D) 600-125 mg-unit tab Take 1 Tab by mouth daily. Yes Historical Provider   amLODIPine (NORVASC) 2.5 mg tablet Take 2.5 mg by mouth as needed (SBP > 160). Yes Historical Provider   flecainide (TAMBOCOR) 100 mg tablet Take 100 mg by mouth two (2) times a day. Yes Historical Provider   doxazosin (CARDURA) 1 mg tablet 1/2 tablet po qhs 4/10/17  Yes Faye Galindo IV, MD   lovastatin (MEVACOR) 20 mg tablet TAKE 1 TABLET DAILY 4/8/17  Yes Faye Galindo IV, MD   HYDROcodone-acetaminophen [facility]) 5-325 mg per tablet Take 1 Tab by mouth two (2) times daily as needed for Pain. Max Daily Amount: 2 Tabs.  Indications: Pain 4/6/17  Yes Faye Galindo IV, MD   pantoprazole (PROTONIX) 40 mg tablet TAKE 1 TABLET DAILY IN PLACE OF OMEPRAZOLE 2/21/17  Yes Jacinto Langley MD diazepam (VALIUM) 2 mg tablet TAKE 1 TABLET BY MOUTH EVERY DAY AS NEEDED 8/3/16  Yes Sharrell Duane IV, MD   lisinopril (PRINIVIL, ZESTRIL) 20 mg tablet TAKE 1 TABLET DAILY 4/3/16  Yes Sharrell Duane IV, MD   aspirin delayed-release 81 mg tablet Take 81 mg by mouth daily. Yes Historical Provider   fluticasone (FLONASE) 50 mcg/actuation nasal spray 2 Sprays by Both Nostrils route as needed for Rhinitis. Yes Historical Provider   amLODIPine (NORVASC) 5 mg tablet Take 5 mg by mouth nightly. Yes Historical Provider   loratadine (CLARITIN) 10 mg tablet Take 10 mg by mouth daily as needed. Yes Historical Provider   temazepam (RESTORIL) 15 mg capsule Take 1 Cap by mouth nightly as needed for Sleep for up to 90 days. Max Daily Amount: 15 mg. 3/10/15 6/8/15  Adia Sanchez MD     Allergies   Allergen Reactions    Beta Blocker [Beta-Blockers (Beta-Adrenergic Blocking Agts)] Other (comments)     Sick sinus syndrome      Codeine Nausea Only    Donnatal [Phenobarb-Hyoscy-Atropine-Scop] Unknown (comments)    Exelon [Rivastigmine] Other (comments)     Nausea     Plavix [Clopidogrel] Rash    Sular [Nisoldipine] Vertigo        Review of Systems:    A comprehensive review of systems was negative except for that written in the History of Present Illness. Objective:     Visit Vitals    /65    Pulse 74    Temp 98.2 °F (36.8 °C)    Resp 16    Ht 5' 5\" (1.651 m)    Wt 54.4 kg (120 lb)    SpO2 97%    BMI 19.97 kg/m2        Physical Exam:   Visit Vitals    /65    Pulse 74    Temp 98.2 °F (36.8 °C)    Resp 16    Ht 5' 5\" (1.651 m)    Wt 54.4 kg (120 lb)    SpO2 97%    BMI 19.97 kg/m2     General appearance: alert, cooperative, no distress, appears stated age  Head: Normocephalic, without obvious abnormality, atraumatic  Eyes: negative  Ears: normal TM's and external ear canals AU  Nose: Nares normal. Septum midline. Mucosa normal. No drainage or sinus tenderness.   Throat: Lips, mucosa, and tongue normal. Teeth and gums normal  Neck: supple, symmetrical, trachea midline, no adenopathy, thyroid: not enlarged, symmetric, no tenderness/mass/nodules, no carotid bruit and no JVD  Back: symmetric, no curvature. ROM normal. No CVA tenderness. Lungs: clear to auscultation bilaterally  Heart: regular rate and rhythm, S1, S2 normal, no murmur, click, rub or gallop  Abdomen: soft, non-tender. Bowel sounds normal. No masses,  no organomegaly  Skin: Skin color, texture, turgor normal. No rashes or lesions  Neurologic: Grossly normal    Imaging:  images and reports reviewed    Lab Review:    Recent Results (from the past 24 hour(s))   C. DIFFICILE (DNA)    Collection Time: 04/11/17  7:44 PM   Result Value Ref Range    C. difficile (DNA) NEGATIVE  NEG     CULTURE, STOOL    Collection Time: 04/11/17  7:44 PM   Result Value Ref Range    Special Requests: NO SPECIAL REQUESTS      Culture result:        NO ROUTINE ENTERIC PATHOGENS ISOLATED INCLUDING SALMONELLA, SHIGELLA, YERSINIA, VIBRIO OR SHIGA TOXIN PRODUCING E. COLI  , THUS FAR     CBC WITH AUTOMATED DIFF    Collection Time: 04/12/17  3:32 AM   Result Value Ref Range    WBC 9.1 3.6 - 11.0 K/uL    RBC 3.75 (L) 3.80 - 5.20 M/uL    HGB 11.0 (L) 11.5 - 16.0 g/dL    HCT 33.6 (L) 35.0 - 47.0 %    MCV 89.6 80.0 - 99.0 FL    MCH 29.3 26.0 - 34.0 PG    MCHC 32.7 30.0 - 36.5 g/dL    RDW 12.8 11.5 - 14.5 %    PLATELET 563 698 - 544 K/uL    NEUTROPHILS 74 32 - 75 %    LYMPHOCYTES 19 12 - 49 %    MONOCYTES 6 5 - 13 %    EOSINOPHILS 1 0 - 7 %    BASOPHILS 0 0 - 1 %    ABS. NEUTROPHILS 6.7 1.8 - 8.0 K/UL    ABS. LYMPHOCYTES 1.7 0.8 - 3.5 K/UL    ABS. MONOCYTES 0.6 0.0 - 1.0 K/UL    ABS. EOSINOPHILS 0.1 0.0 - 0.4 K/UL    ABS.  BASOPHILS 0.0 0.0 - 0.1 K/UL   METABOLIC PANEL, BASIC    Collection Time: 04/12/17  3:32 AM   Result Value Ref Range    Sodium 142 136 - 145 mmol/L    Potassium 3.2 (L) 3.5 - 5.1 mmol/L    Chloride 107 97 - 108 mmol/L    CO2 24 21 - 32 mmol/L Anion gap 11 5 - 15 mmol/L    Glucose 76 65 - 100 mg/dL    BUN 11 6 - 20 MG/DL    Creatinine 0.56 0.55 - 1.02 MG/DL    BUN/Creatinine ratio 20 12 - 20      GFR est AA >60 >60 ml/min/1.73m2    GFR est non-AA >60 >60 ml/min/1.73m2    Calcium 8.1 (L) 8.5 - 10.1 MG/DL   MAGNESIUM    Collection Time: 04/12/17  3:32 AM   Result Value Ref Range    Magnesium 1.9 1.6 - 2.4 mg/dL       Labs and radiology: images and reports reviewed      Assessment:     Colitis probably related to ischemia. Cdiff negative. Would recommend continuing supportive care with IVF resuscitation. Clear liquids today. Advance diet tomorrow if continues to improve.    IV ABX    Plan:     As above    Signed By: Etienne Zaragoza MD     April 12, 2017

## 2017-04-12 NOTE — PROGRESS NOTES
Medical Progress Note      NAME: Jennifer Luis   :  8/10/1926  MRM:  [id number]    Date/Time: 2017           Problem List:     Active Problems:    Acute colitis (10/14/2015)             Subjective:     Less LLQ abd pain . 2 liquid maroon stools overnight.   K+down to 3.2   Denies sob, cp, palpitations    Past Medical History:   Diagnosis Date    [facility]     Arrhythmia     Paroxysmal  Afib,  SSS    Carotid stenosis, bilateral     moderate , right >left    GERD (gastroesophageal reflux disease) 12/10/2011    HTN (hypertension) 2011    HX OTHER MEDICAL     cardiac cath ;non obstructive CAD. Renal artery stents; open    HX OTHER MEDICAL     LLQ abd. pain thought due to spasm.  Hypercholesterolemia     Hypertension     Osteoporosis     Osteoporosis 2011    PAF (paroxysmal atrial fibrillation) ([facility] 2011    Rhinitis 2011    SSS (sick sinus syndrome) ([facility] 2011            Objective:         Vitals:      Last 24hrs VS reviewed since prior progress note. Most recent are:    Visit Vitals    /61    Pulse 71    Temp 98.8 °F (37.1 °C)    Resp 18    Ht 5' 5\" (1.651 m)    Wt 120 lb (54.4 kg)    SpO2 97%    BMI 19.97 kg/m2     SpO2 Readings from Last 6 Encounters:   17 97%   10/14/15 96%   05/01/15 97%   03/10/15 97%   14 95%   10/24/13 96%    O2 Flow Rate (L/min): 2 l/min     Intake/Output Summary (Last 24 hours) at 17 0739  Last data filed at 17 0415   Gross per 24 hour   Intake           578.75 ml   Output              400 ml   Net           178.75 ml                  Exam:      General:  Alert, cooperative, no distress, appears stated age. Lungs:   Clear to auscultation bilaterally. Heart:  Regular rate and rhythm, S1, S2 normal, no murmur, click, rub or gallop. Abdomen:   Soft, mild LLQ tenderness , slight rebound. Bowel sounds normal. No masses,  No organomegaly. Extremities: No ankle edema.      Lab Data Reviewed: (see below)  Recent Results (from the past 24 hour(s))   CBC WITH AUTOMATED DIFF    Collection Time: 04/11/17  8:25 AM   Result Value Ref Range    WBC 12.1 (H) 3.6 - 11.0 K/uL    RBC 4.26 3.80 - 5.20 M/uL    HGB 12.7 11.5 - 16.0 g/dL    HCT 38.2 35.0 - 47.0 %    MCV 89.7 80.0 - 99.0 FL    MCH 29.8 26.0 - 34.0 PG    MCHC 33.2 30.0 - 36.5 g/dL    RDW 12.7 11.5 - 14.5 %    PLATELET 553 859 - 451 K/uL    NEUTROPHILS 76 (H) 32 - 75 %    LYMPHOCYTES 16 12 - 49 %    MONOCYTES 7 5 - 13 %    EOSINOPHILS 1 0 - 7 %    BASOPHILS 0 0 - 1 %    ABS. NEUTROPHILS 9.1 (H) 1.8 - 8.0 K/UL    ABS. LYMPHOCYTES 2.0 0.8 - 3.5 K/UL    ABS. MONOCYTES 0.8 0.0 - 1.0 K/UL    ABS. EOSINOPHILS 0.2 0.0 - 0.4 K/UL    ABS. BASOPHILS 0.0 0.0 - 0.1 K/UL   TYPE & SCREEN    Collection Time: 04/11/17  8:25 AM   Result Value Ref Range    Crossmatch Expiration 04/14/2017     ABO/Rh(D) Rea Mario POSITIVE     Antibody screen NEG    PROTHROMBIN TIME + INR    Collection Time: 04/11/17  8:25 AM   Result Value Ref Range    INR 1.0 0.9 - 1.1      Prothrombin time 10.7 9.0 - 11.1 sec   PTT    Collection Time: 04/11/17  8:25 AM   Result Value Ref Range    aPTT 28.0 22.1 - 32.5 sec    aPTT, therapeutic range     58.0 - 48.8 SECS   METABOLIC PANEL, COMPREHENSIVE    Collection Time: 04/11/17  8:25 AM   Result Value Ref Range    Sodium 136 136 - 145 mmol/L    Potassium 3.4 (L) 3.5 - 5.1 mmol/L    Chloride 102 97 - 108 mmol/L    CO2 25 21 - 32 mmol/L    Anion gap 9 5 - 15 mmol/L    Glucose 98 65 - 100 mg/dL    BUN 14 6 - 20 MG/DL    Creatinine 0.77 0.55 - 1.02 MG/DL    BUN/Creatinine ratio 18 12 - 20      GFR est AA >60 >60 ml/min/1.73m2    GFR est non-AA >60 >60 ml/min/1.73m2    Calcium 8.9 8.5 - 10.1 MG/DL    Bilirubin, total 0.7 0.2 - 1.0 MG/DL    ALT (SGPT) 19 12 - 78 U/L    AST (SGOT) 19 15 - 37 U/L    Alk.  phosphatase 88 45 - 117 U/L    Protein, total 6.6 6.4 - 8.2 g/dL    Albumin 3.5 3.5 - 5.0 g/dL    Globulin 3.1 2.0 - 4.0 g/dL    A-G Ratio 1.1 1.1 - 2.2 LIPASE    Collection Time: 04/11/17  8:25 AM   Result Value Ref Range    Lipase 113 73 - 393 U/L   LACTIC ACID, PLASMA    Collection Time: 04/11/17  9:40 AM   Result Value Ref Range    Lactic acid 1.1 0.4 - 2.0 MMOL/L   URINALYSIS W/ RFLX MICROSCOPIC    Collection Time: 04/11/17  1:03 PM   Result Value Ref Range    Color YELLOW/STRAW      Appearance CLEAR CLEAR      Specific gravity 1.012 1.003 - 1.030      pH (UA) 6.5 5.0 - 8.0      Protein NEGATIVE  NEG mg/dL    Glucose NEGATIVE  NEG mg/dL    Ketone NEGATIVE  NEG mg/dL    Bilirubin NEGATIVE  NEG      Blood NEGATIVE  NEG      Urobilinogen 0.2 0.2 - 1.0 EU/dL    Nitrites NEGATIVE  NEG      Leukocyte Esterase SMALL (A) NEG      WBC 0-4 0 - 4 /hpf    RBC 0-5 0 - 5 /hpf    Epithelial cells FEW FEW /lpf    Bacteria NEGATIVE  NEG /hpf   CBC WITH AUTOMATED DIFF    Collection Time: 04/12/17  3:32 AM   Result Value Ref Range    WBC 9.1 3.6 - 11.0 K/uL    RBC 3.75 (L) 3.80 - 5.20 M/uL    HGB 11.0 (L) 11.5 - 16.0 g/dL    HCT 33.6 (L) 35.0 - 47.0 %    MCV 89.6 80.0 - 99.0 FL    MCH 29.3 26.0 - 34.0 PG    MCHC 32.7 30.0 - 36.5 g/dL    RDW 12.8 11.5 - 14.5 %    PLATELET 679 329 - 924 K/uL    NEUTROPHILS 74 32 - 75 %    LYMPHOCYTES 19 12 - 49 %    MONOCYTES 6 5 - 13 %    EOSINOPHILS 1 0 - 7 %    BASOPHILS 0 0 - 1 %    ABS. NEUTROPHILS 6.7 1.8 - 8.0 K/UL    ABS. LYMPHOCYTES 1.7 0.8 - 3.5 K/UL    ABS. MONOCYTES 0.6 0.0 - 1.0 K/UL    ABS. EOSINOPHILS 0.1 0.0 - 0.4 K/UL    ABS.  BASOPHILS 0.0 0.0 - 0.1 K/UL   METABOLIC PANEL, BASIC    Collection Time: 04/12/17  3:32 AM   Result Value Ref Range    Sodium 142 136 - 145 mmol/L    Potassium 3.2 (L) 3.5 - 5.1 mmol/L    Chloride 107 97 - 108 mmol/L    CO2 24 21 - 32 mmol/L    Anion gap 11 5 - 15 mmol/L    Glucose 76 65 - 100 mg/dL    BUN 11 6 - 20 MG/DL    Creatinine 0.56 0.55 - 1.02 MG/DL    BUN/Creatinine ratio 20 12 - 20      GFR est AA >60 >60 ml/min/1.73m2    GFR est non-AA >60 >60 ml/min/1.73m2    Calcium 8.1 (L) 8.5 - 10.1 MG/DL Medications Reviewed: (see below)    ______________________________________________________________________    Medications:     Current Facility-Administered Medications   Medication Dose Route Frequency    potassium chloride (K-DUR, KLOR-CON) tablet 30 mEq  30 mEq Oral BID    amLODIPine (NORVASC) tablet 5 mg  5 mg Oral QHS    aspirin delayed-release tablet 81 mg  81 mg Oral DAILY    diazePAM (VALIUM) tablet 2 mg  2 mg Oral DAILY PRN    doxazosin (CARDURA) tablet 0.5 mg  0.5 mg Oral QHS    flecainide (TAMBOCOR) tablet 100 mg  100 mg Oral BID    HYDROcodone-acetaminophen (NORCO) 5-325 mg per tablet 1 Tab  1 Tab Oral BID PRN    lisinopril (PRINIVIL, ZESTRIL) tablet 20 mg  20 mg Oral DAILY    pantoprazole (PROTONIX) tablet 40 mg  40 mg Oral ACB    sodium chloride (NS) flush 5-10 mL  5-10 mL IntraVENous Q8H    sodium chloride (NS) flush 5-10 mL  5-10 mL IntraVENous PRN    enoxaparin (LOVENOX) injection 40 mg  40 mg SubCUTAneous Q24H    0.9% sodium chloride infusion  75 mL/hr IntraVENous CONTINUOUS    metroNIDAZOLE (FLAGYL) IVPB premix 500 mg  500 mg IntraVENous Q8H    amLODIPine (NORVASC) tablet 2.5 mg  2.5 mg Oral DAILY PRN                   Assessment:   Colitis. Leading DDX : ischemic. R/o C.  Diff   Hypokalemia   HTN   Patient Active Problem List   Diagnosis Code    HTN (hypertension) I10    [facility]SS [facility]ick [facility]) [facility] I49.5    PAF (paroxysmal atrial fibrillation) ([facility]) I48.0    Hypercholesterolemia E78.00    Osteoporosis M81.0    Rhinitis J31.0    Carotid stenosis, bilateral I65.23    GERD (gastroesophageal reflux disease) K21.9    Abdominal pain R10.9    Peripheral vascular disease (HCC) I73.9    Ischemic colitis ([facility] K55.9    Gastritis K29.70    Diverticulitis of colon (without mention of hemorrhage) K57.32    Diarrhea R19.7    Colitis K52.9    Volume depletion, gastrointestinal loss E86.9    Abdominal pain, acute, generalized R10.84    Left sided colitis with rectal bleeding ([address] K51.511    Thrush, oral B37.0    Fever and chills R50.9    Acute respiratory failure with hypoxemia (HCC) J96.01    Acute colitis K52.9    Vasovagal near syncope R55    Non-sustained ventricular tachycardia (HCC) I47.2    UTI (lower urinary tract infection) N39.0    Unsteady gait R26.81    VBI (vertebrobasilar insufficiency) G45.0    Labile essential hypertension I10    Functional gait abnormality R26.89          Plan:     Continue IVF, IV Flagyl, check C. Diff , CRS consult. Stop Lovenox   Avoid surgery and avoid colonoscopy.    Check Mag , replete K+   Monitor bp   Spoke w/ dtr, g dtr , here.                                                        ___________________________________________________      Attending Physician: Benito Olivarez MD

## 2017-04-12 NOTE — PROGRESS NOTES
Primary Nurse Lui Farias RN and Velasquez Weaver RN performed a dual skin assessment on this patient No impairment noted  Rogerio score is 19

## 2017-04-12 NOTE — PROGRESS NOTES
Bedside shift change report given to Skyler Paula (oncoming nurse) by Abilio Dias (offgoing nurse). Report included the following information SBAR.

## 2017-04-12 NOTE — PROGRESS NOTES
FABRIZIO called and said patient is having PVC's. Called Dr. Denita Boeck. He said to continue monitoring.

## 2017-04-12 NOTE — PROGRESS NOTES
Chart reviewed. Pt presented to ED with abd. pain and rectal bleeding yesterday. CM met with pt to introduce role of CM and discuss discharge needs. Pt lives alone in a private residence in [place]. Pt is independent with ADLs and uses no DME. Pt still drives herself unless she is going far distances and in that case she has family to assist with transportation. Pt has supportive family, granddaughter Neli ([phone]) who lives in [place] but works in [place]. Granddaughter stays with pt during the week. Pt also has son Shiva Marrero and daughter Tan Floyd who live locally. Confirmed insurance is Medicare and [employer]. Pt gets medications filled at [facility] in [place]. Family will transport pt home via car at discharge. No needs expressed at this time. Care management will follow and be available if needs arise. Care Management Interventions  PCP Verified by CM: Yes (Dr. Michelle Kerr)  Mode of Transport at Discharge:  Other (see comment) (family)  Transition of Care Consult (CM Consult): Discharge Planning  MyChart Signup: No  Discharge Durable Medical Equipment: No  Physical Therapy Consult: No  Occupational Therapy Consult: No  Speech Therapy Consult: No  Current Support Network: Own Home, Lives Alone, Family Lives Nearby  Confirm Follow Up Transport: Family  Plan discussed with Pt/Family/Caregiver: Yes  Discharge Location  Discharge Placement: Home     SHYAM Farrar/RAFFI

## 2017-04-13 LAB
ANION GAP BLD CALC-SCNC: 7 MMOL/L (ref 5–15)
BUN SERPL-MCNC: 10 MG/DL (ref 6–20)
BUN/CREAT SERPL: 20 (ref 12–20)
CALCIUM SERPL-MCNC: 8.6 MG/DL (ref 8.5–10.1)
CHLORIDE SERPL-SCNC: 107 MMOL/L (ref 97–108)
CO2 SERPL-SCNC: 25 MMOL/L (ref 21–32)
CREAT SERPL-MCNC: 0.5 MG/DL (ref 0.55–1.02)
ERYTHROCYTE [DISTWIDTH] IN BLOOD BY AUTOMATED COUNT: 12.7 % (ref 11.5–14.5)
GLUCOSE SERPL-MCNC: 71 MG/DL (ref 65–100)
HCT VFR BLD AUTO: 32.8 % (ref 35–47)
HGB BLD-MCNC: 10.9 G/DL (ref 11.5–16)
MCH RBC QN AUTO: 29.9 PG (ref 26–34)
MCHC RBC AUTO-ENTMCNC: 33.2 G/DL (ref 30–36.5)
MCV RBC AUTO: 89.9 FL (ref 80–99)
PLATELET # BLD AUTO: 184 K/UL (ref 150–400)
POTASSIUM SERPL-SCNC: 3.8 MMOL/L (ref 3.5–5.1)
RBC # BLD AUTO: 3.65 M/UL (ref 3.8–5.2)
SODIUM SERPL-SCNC: 139 MMOL/L (ref 136–145)
WBC # BLD AUTO: 5.8 K/UL (ref 3.6–11)

## 2017-04-13 PROCEDURE — 74011000250 HC RX REV CODE- 250: Performed by: FAMILY MEDICINE

## 2017-04-13 PROCEDURE — 97116 GAIT TRAINING THERAPY: CPT

## 2017-04-13 PROCEDURE — 65660000000 HC RM CCU STEPDOWN

## 2017-04-13 PROCEDURE — 74011250637 HC RX REV CODE- 250/637: Performed by: FAMILY MEDICINE

## 2017-04-13 PROCEDURE — G8978 MOBILITY CURRENT STATUS: HCPCS

## 2017-04-13 PROCEDURE — 77010033678 HC OXYGEN DAILY

## 2017-04-13 PROCEDURE — 74011250637 HC RX REV CODE- 250/637: Performed by: INTERNAL MEDICINE

## 2017-04-13 PROCEDURE — G8979 MOBILITY GOAL STATUS: HCPCS

## 2017-04-13 PROCEDURE — 85027 COMPLETE CBC AUTOMATED: CPT | Performed by: INTERNAL MEDICINE

## 2017-04-13 PROCEDURE — 80048 BASIC METABOLIC PNL TOTAL CA: CPT | Performed by: INTERNAL MEDICINE

## 2017-04-13 PROCEDURE — 36415 COLL VENOUS BLD VENIPUNCTURE: CPT | Performed by: INTERNAL MEDICINE

## 2017-04-13 PROCEDURE — 97161 PT EVAL LOW COMPLEX 20 MIN: CPT

## 2017-04-13 RX ORDER — METRONIDAZOLE 250 MG/1
500 TABLET ORAL 3 TIMES DAILY
Status: DISCONTINUED | OUTPATIENT
Start: 2017-04-13 | End: 2017-04-14 | Stop reason: HOSPADM

## 2017-04-13 RX ORDER — POTASSIUM CHLORIDE 750 MG/1
10 TABLET, FILM COATED, EXTENDED RELEASE ORAL 2 TIMES DAILY
Status: DISCONTINUED | OUTPATIENT
Start: 2017-04-13 | End: 2017-04-14 | Stop reason: HOSPADM

## 2017-04-13 RX ADMIN — FAMOTIDINE 20 MG: 20 TABLET ORAL at 08:29

## 2017-04-13 RX ADMIN — DIAZEPAM 2 MG: 2 TABLET ORAL at 21:53

## 2017-04-13 RX ADMIN — METRONIDAZOLE 500 MG: 250 TABLET ORAL at 19:16

## 2017-04-13 RX ADMIN — POTASSIUM CHLORIDE 10 MEQ: 750 TABLET, FILM COATED, EXTENDED RELEASE ORAL at 08:29

## 2017-04-13 RX ADMIN — METRONIDAZOLE 500 MG: 250 TABLET ORAL at 11:48

## 2017-04-13 RX ADMIN — ASPIRIN 81 MG: 81 TABLET, COATED ORAL at 08:29

## 2017-04-13 RX ADMIN — POTASSIUM CHLORIDE 10 MEQ: 750 TABLET, FILM COATED, EXTENDED RELEASE ORAL at 18:05

## 2017-04-13 RX ADMIN — FLECAINIDE ACETATE 100 MG: 100 TABLET ORAL at 18:05

## 2017-04-13 RX ADMIN — Medication 10 ML: at 21:48

## 2017-04-13 RX ADMIN — FAMOTIDINE 20 MG: 20 TABLET ORAL at 18:05

## 2017-04-13 RX ADMIN — DOXAZOSIN 0.5 MG: 1 TABLET ORAL at 22:26

## 2017-04-13 RX ADMIN — LISINOPRIL 20 MG: 20 TABLET ORAL at 08:29

## 2017-04-13 RX ADMIN — AMLODIPINE BESYLATE 5 MG: 5 TABLET ORAL at 21:46

## 2017-04-13 RX ADMIN — HYDROCODONE BITARTRATE AND ACETAMINOPHEN 1 TABLET: 5; 325 TABLET ORAL at 07:37

## 2017-04-13 RX ADMIN — AMLODIPINE BESYLATE 2.5 MG: 5 TABLET ORAL at 19:16

## 2017-04-13 RX ADMIN — FLECAINIDE ACETATE 100 MG: 100 TABLET ORAL at 08:29

## 2017-04-13 RX ADMIN — METRONIDAZOLE 500 MG: 500 INJECTION, SOLUTION INTRAVENOUS at 02:58

## 2017-04-13 NOTE — PROGRESS NOTES
Medical Progress Note      NAME: Mia Apodaca   :  8/10/1926  MRM:  [id number]    Date/Time: 2017           Problem List:     Active Problems:    Acute colitis (10/14/2015)             Subjective:   PCP. CRS input appreciated. Abdomen more comfortable. Denies rectal bleeding since yesterday afternoon. Discussed dx : Probable Ischemic Colitis w/ pt and granddaugter Zechariah Chaudhary , here. She had some PVC's yesterday but denies palpitations. Past Medical History:   Diagnosis Date    Acute ischemic colitis ([facility] [address]     Arrhythmia     Paroxysmal  Afib,  SSS    Carotid stenosis, bilateral     moderate , right >left    GERD (gastroesophageal reflux disease) 12/10/2011    HTN (hypertension) 2011    HX OTHER MEDICAL     cardiac cath ;non obstructive CAD. Renal artery stents; open    HX OTHER MEDICAL     LLQ abd. pain thought due to spasm.  Hypercholesterolemia     Hypertension     Osteoporosis     Osteoporosis 2011    PAF (paroxysmal atrial fibrillation) ([facility] [address] 2011    Rhinitis 2011    SSS (sick sinus syndrome) ([facility] 2011            Objective:         Vitals:      Last 24hrs VS reviewed since prior progress note. Most recent are:    Visit Vitals    /57 (BP 1 Location: Right arm, BP Patient Position: At rest)    Pulse 62    Temp 98.3 °F (36.8 °C)    Resp 16    Ht 5' 5\" (1.651 m)    Wt 120 lb (54.4 kg)    SpO2 97%    BMI 19.97 kg/m2     SpO2 Readings from Last 6 Encounters:   17 97%   10/14/15 96%   05/01/15 97%   03/10/15 97%   14 95%   10/24/13 96%    O2 Flow Rate (L/min): 2 l/min     Intake/Output Summary (Last 24 hours) at 17 0740  Last data filed at 17 2143   Gross per 24 hour   Intake            907.5 ml   Output              400 ml   Net            507.5 ml                  Exam:      General:  Alert, cooperative, no distress, appears stated age. Lungs:   Clear to auscultation bilaterally.    Heart:  Regular rate and rhythm, S1, S2 normal, no murmur, click, rub or gallop. Abdomen:   Soft, non-tender. Bowel sounds normal. No masses,  No organomegaly. Extremities: 2+ PT pulses b/l . No ankle edema.      Lab Data Reviewed: (see below)  Recent Results (from the past 24 hour(s))   CBC W/O DIFF    Collection Time: 04/13/17  3:12 AM   Result Value Ref Range    WBC 5.8 3.6 - 11.0 K/uL    RBC 3.65 (L) 3.80 - 5.20 M/uL    HGB 10.9 (L) 11.5 - 16.0 g/dL    HCT 32.8 (L) 35.0 - 47.0 %    MCV 89.9 80.0 - 99.0 FL    MCH 29.9 26.0 - 34.0 PG    MCHC 33.2 30.0 - 36.5 g/dL    RDW 12.7 11.5 - 14.5 %    PLATELET 724 455 - 507 K/uL   METABOLIC PANEL, BASIC    Collection Time: 04/13/17  3:12 AM   Result Value Ref Range    Sodium 139 136 - 145 mmol/L    Potassium 3.8 3.5 - 5.1 mmol/L    Chloride 107 97 - 108 mmol/L    CO2 25 21 - 32 mmol/L    Anion gap 7 5 - 15 mmol/L    Glucose 71 65 - 100 mg/dL    BUN 10 6 - 20 MG/DL    Creatinine 0.50 (L) 0.55 - 1.02 MG/DL    BUN/Creatinine ratio 20 12 - 20      GFR est AA >60 >60 ml/min/1.73m2    GFR est non-AA >60 >60 ml/min/1.73m2    Calcium 8.6 8.5 - 10.1 MG/DL       Medications Reviewed: (see below)    ______________________________________________________________________    Medications:     Current Facility-Administered Medications   Medication Dose Route Frequency    potassium chloride SR (KLOR-CON 10) tablet 30 mEq  30 mEq Oral BID    famotidine (PEPCID) tablet 20 mg  20 mg Oral BID    amLODIPine (NORVASC) tablet 5 mg  5 mg Oral QHS    aspirin delayed-release tablet 81 mg  81 mg Oral DAILY    diazePAM (VALIUM) tablet 2 mg  2 mg Oral DAILY PRN    doxazosin (CARDURA) tablet 0.5 mg  0.5 mg Oral QHS    flecainide (TAMBOCOR) tablet 100 mg  100 mg Oral BID    HYDROcodone-acetaminophen (NORCO) 5-325 mg per tablet 1 Tab  1 Tab Oral BID PRN    lisinopril (PRINIVIL, ZESTRIL) tablet 20 mg  20 mg Oral DAILY    sodium chloride (NS) flush 5-10 mL  5-10 mL IntraVENous Q8H    sodium chloride (NS) flush 5-10 mL 5-10 mL IntraVENous PRN    0.9% sodium chloride infusion  25 mL/hr IntraVENous CONTINUOUS    metroNIDAZOLE (FLAGYL) IVPB premix 500 mg  500 mg IntraVENous Q8H    amLODIPine (NORVASC) tablet 2.5 mg  2.5 mg Oral DAILY PRN                   Assessment:   Probable Ischemic Colitis better. Advance diet. Mobilize w/ PT.   K+ level now nl. F/u labs   Hopefully home tomorrow if good day today . HTN , bp varies.  Follow   Patient Active Problem List   Diagnosis Code    HTN (hypertension) I10    [facility] I49.5    PAF (paroxysmal atrial fibrillation) ([facility]) I48.0    Hypercholesterolemia E78.00    Osteoporosis M81.0    Rhinitis J31.0    Carotid stenosis, bilateral I65.23    GERD (gastroesophageal reflux disease) K21.9    Abdominal pain R10.9    Peripheral vascular disease ([facility]) I73.9    Ischemic colitis ([facility]) K55.9    Gastritis K29.70    Diverticulitis of colon (without mention of hemorrhage) K57.32    Diarrhea R19.7    Colitis K52.9    Volume depletion, gastrointestinal loss E86.9    Abdominal pain, acute, generalized R10.84    Left sided colitis with rectal bleeding ([facility]) K51.511    Thrush, oral B37.0    Fever and chills R50.9    Acute respiratory failure with hypoxemia ([facility]) J96.01    Acute colitis K52.9    Vasovagal near syncope R55    Non-sustained ventricular tachycardia ([facility]) I47.2    UTI (lower urinary tract infection) N39.0    Unsteady gait R26.81    VBI (vertebrobasilar insufficiency) G45.0    Labile essential hypertension I10    Functional gait abnormality R26.89          Plan:     As above.                                                        ___________________________________________________      Attending Physician: Yaniv Calvillo MD

## 2017-04-13 NOTE — PROGRESS NOTES
Doing well  No more blood per rectum  Still a bit tender  Visit Vitals    /57 (BP 1 Location: Right arm, BP Patient Position: At rest)    Pulse 62    Temp 98.3 °F (36.8 °C)    Resp 16    Ht 5' 5\" (1.651 m)    Wt 54.4 kg (120 lb)    SpO2 97%    BMI 19.97 kg/m2       Date 04/12/17 0700 - 04/13/17 0659 04/13/17 0700 - 04/14/17 0659   Shift 0700-1859 1900-0659 24 Hour Total 6779-8398 2134-9702 24 Hour Total   I  N  T  A  K  E   P.O. 360 60 420         P. O. 360 60 420       I.V.  (mL/kg/hr) 487.5  (0.7)  487.5  (0.4)         Volume (0.9% sodium chloride infusion) 487.5  487.5       Shift Total  (mL/kg) 847.5  (15.6) 60  (1.1) 907.5  (16.7)      O  U  T  P  U  T   Urine  (mL/kg/hr) 400  (0.6)  400  (0.3)         Urine Voided 400  400         Urine Occurrence(s) 2 x 2 x 4 x       Stool            Stool Occurrence(s) 1 x  1 x       Shift Total  (mL/kg) 400  (7.3)  400  (7.3)      .5 60 507.5      Weight (kg) 54.4 54.4 54.4 54.4 54.4 54.4     abd soft   but minimally tender in llq    A/P advance diet   Continue abx

## 2017-04-13 NOTE — PROGRESS NOTES
Clinical Pharmacy Note: Re: IV to PO Automatic Conversion - Antibiotic    Please note: Rachelle Bethea medication(s) (metronidazole) has/have been changed from IV to PO based on the following criteria:    The patient:  1. Has received IV therapy for at least 48 hours   2. Has a functioning GI tract  - Taking scheduled oral medications  - Tolerating tube feeds at goal rate or a full liquid, soft, or regular diet         3. Is clinically stable        - Temperature < 100.4F for at least 24 hours        - WBC is trending down    This IV to PO conversion is based on the [employer] approved automatic conversion policy for eligible patients. Please call with questions.

## 2017-04-13 NOTE — PROGRESS NOTES
Problem: Mobility Impaired (Adult and Pediatric)  Goal: *Acute Goals and Plan of Care (Insert Text)  Physical Therapy Goals  Initiated 4/13/2017  1. Patient will move from supine to sit and sit to supine in bed with independence within 7 day(s). 2. Patient will transfer from bed to chair and chair to bed with independence using the least restrictive device within 7 day(s). 3. Patient will perform sit to stand with independence within 7 day(s). 4. Patient will ambulate with independence for 300 feet with the least restrictive device within 7 day(s). 5. Patient will ascend/descend 2 stairs with 1 handrail(s) with modified independence within 7 day(s). PHYSICAL THERAPY EVALUATION  Patient: Charline Mcpherson (80 y.o. female)  Date: 4/13/2017  Primary Diagnosis: Acute colitis        Precautions:   Fall      ASSESSMENT :  Pt is cleared for PT by RN. Based on the objective data described below, the patient presents with mildly decreased stability with gait and general decreased activity tolerance as compared to baseline independent level. Pt also demonstrates mildly decreased balance with Tinetti score 24/28. Pt presents at overall SB/CGA with mobility today. She is able to ambulate up/down 2 stairs with handrail, which is similar to her entry into her home. Pt's granddaughter is present for eval/Tx today. Pt may ambulate with assist in the hallway using IV pole for support. Will see pt 1-2 more visits prior to discharge to ensure stable gait without AD. No f/u needs anticipated. Patient will benefit from skilled intervention to address the above impairments.   Patients rehabilitation potential is considered to be Good  Factors which may influence rehabilitation potential include:   [X]         None noted  [ ]         Mental ability/status  [ ]         Medical condition  [ ]         Home/family situation and support systems  [ ]         Safety awareness  [ ]         Pain tolerance/management  [ ]         Other: PLAN :  Recommendations and Planned Interventions:  [X]           Bed Mobility Training             [ ]    Neuromuscular Re-Education  [X]           Transfer Training                   [ ]    Orthotic/Prosthetic Training  [X]           Gait Training                         [ ]    Modalities  [X]           Therapeutic Exercises           [ ]    Edema Management/Control  [X]           Therapeutic Activities            [X]    Patient and Family Training/Education  [ ]           Other (comment):     Frequency/Duration: Patient will be followed by physical therapy  5 times a week to address goals. Discharge Recommendations: None  Further Equipment Recommendations for Discharge: TBD; likely none       SUBJECTIVE:   Patient stated Who ordered this?       OBJECTIVE DATA SUMMARY:   HISTORY:    Past Medical History:   Diagnosis Date    Acute ischemic [facility] ([facility] [address]      Arrhythmia       Paroxysmal  Afib,  SSS    Carotid stenosis, bilateral       moderate , right >left    GERD (gastroesophageal reflux disease) 12/10/2011    HTN (hypertension) 12/8/2011    HX OTHER MEDICAL 2011     cardiac cath ;non obstructive CAD. Renal artery stents; open    HX OTHER MEDICAL 2011     LLQ abd. pain thought due to spasm.  Hypercholesterolemia      Hypertension      Osteoporosis      Osteoporosis 12/8/2011    PAF (paroxysmal atrial fibrillation) ([facility] [address] 12/8/2011    Rhinitis 12/8/2011    SSS (sick sinus syndrome) ([facility] [address] 12/8/2011     Past Surgical History:   Procedure Laterality Date    ENDOSCOPY, COLON, DIAGNOSTIC   3/2/11     diverticulosis,hemorrhoids    HX CHOLECYSTECTOMY        HX HYSTERECTOMY         1960    HX OTHER SURGICAL         stent left leg    HX TONSILLECTOMY         Prior Level of Function/Home Situation: Pt was completely independent with mobility, ADL's, and driving prior to illness.    Personal factors and/or comorbidities impacting plan of care:      Home Situation  Home Environment: Private residence  # Steps to Enter: 2  Rails to Enter: Yes  Hand Rails : Bilateral  One/Two Story Residence: One story  Living Alone: Yes  Support Systems: Family member(s)  Patient Expects to be Discharged to[de-identified] Private residence  Current DME Used/Available at Home: None     EXAMINATION/PRESENTATION/DECISION MAKING:   Critical Behavior:              Hearing: Auditory  Auditory Impairment: None     Range Of Motion:  AROM: Within functional limits                       Strength:    Strength:  Within functional limits                    Tone & Sensation:                                  Coordination:  Coordination: Generally decreased, functional  Vision:      Functional Mobility:  Bed Mobility:              Transfers:  Sit to Stand: Stand-by asssistance  Stand to Sit: Stand-by asssistance  Stand Pivot Transfers: Stand-by asssistance     Bed to Chair: Stand-by asssistance              Balance:   Sitting: Intact  Standing: Impaired  Standing - Static: Good  Standing - Dynamic : Fair  Ambulation/Gait Training:  Distance (ft): 300 Feet (ft)  Assistive Device: Gait belt  Ambulation - Level of Assistance: Contact guard assistance                       Speed/Michelle: Pace decreased (<100 feet/min)                    Stairs:  Number of Stairs Trained: 2  Stairs - Level of Assistance: Contact guard assistance              Rail Use: Right         Functional Measure:  Tinetti test:      Sitting Balance: 1  Arises: 1  Attempts to Rise: 1  Immediate Standing Balance: 2  Standing Balance: 2  Nudged: 2  Eyes Closed: 1  Turn 360 Degrees - Continuous/Discontinuous: 1  Turn 360 Degrees - Steady/Unsteady: 1  Sitting Down: 1  Balance Score: 13  Indication of Gait: 1  R Step Length/Height: 1  L Step Length/Height: 1  R Foot Clearance: 1  L Foot Clearance: 1  Step Symmetry: 1  Step Continuity: 1  Path: 1  Trunk: 2  Walking Time: 1  Gait Score: 11  Total Score: 24         Tinetti Test and G-code impairment scale:  Percentage of Impairment CH     0%    CI 1-19% CJ     20-39% CK     40-59% CL     60-79% CM     80-99% CN      100%   Tinetti  Score 0-28 28 23-27 17-22 12-16 6-11 1-5 0          Tinetti Tool Score Risk of Falls  <19 = High Fall Risk  19-24 = Moderate Fall Risk  25-28 = Low Fall Risk  Tinetti ME. Performance-Oriented Assessment of Mobility Problems in Elderly Patients. [place] 66; [id number]. (Scoring Description: PT Bulletin Feb. 10, 1993)     Older adults: Chikis Shepard et al, 2009; n = 1000 [facility] elderly evaluated with ABC, COMFORT, ADL, and IADL)  · Mean COMFORT score for males aged 69-68 years = 26.21(3.40)  · Mean COMFORT score for females age 69-68 years = 25.16(4.30)  · Mean COMFORT score for males over 80 years = 23.29(6.02)  · Mean COMFORT score for females over 80 years = 17.20(8.32)         G codes: In compliance with CMSs Claims Based Outcome Reporting, the following G-code set was chosen for this patient based on their primary functional limitation being treated: The outcome measure chosen to determine the severity of the functional limitation was the Tinetti with a score of 24/28 which was correlated with the impairment scale.       · Mobility - Walking and Moving Around:               - CURRENT STATUS:    CI - 1%-19% impaired, limited or restricted               - GOAL STATUS:           CI - 1%-19% impaired, limited or restricted               - D/C STATUS:                       ---------------To be determined---------------      Physical Therapy Evaluation Charge Determination   History Examination Presentation Decision-Making   MEDIUM  Complexity : 1-2 comorbidities / personal factors will impact the outcome/ POC  MEDIUM Complexity : 3 Standardized tests and measures addressing body structure, function, activity limitation and / or participation in recreation  LOW Complexity : Stable, uncomplicated  LOW Complexity : FOTO score of       Based on the above components, the patient evaluation is determined to be of the following complexity level: LOW      Pain:  Pain Scale 1: Numeric (0 - 10)  Pain Intensity 1: 0  Pain Location 1: Breast        Pain Intervention(s) 1: Medication (see MAR)  Activity Tolerance:   Good for activities today. Please refer to the flowsheet for vital signs taken during this treatment. After treatment:   [X]         Patient left in no apparent distress sitting up in chair  [ ]         Patient left in no apparent distress in bed  [X]         Call bell left within reach  [X]         Nursing notified  [X]         Caregiver present  [ ]         Bed alarm activated      COMMUNICATION/EDUCATION:   The patients plan of care was discussed with: Registered Nurse.  [X]         Fall prevention education was provided and the patient/caregiver indicated understanding. [X]         Patient/family have participated as able in goal setting and plan of care. [X]         Patient/family agree to work toward stated goals and plan of care. [ ]         Patient understands intent and goals of therapy, but is neutral about his/her participation. [ ]         Patient is unable to participate in goal setting and plan of care.      Thank you for this referral.  Jordan Trujillo, PT   Time Calculation: 19 mins

## 2017-04-14 VITALS
DIASTOLIC BLOOD PRESSURE: 76 MMHG | WEIGHT: 120 LBS | HEIGHT: 65 IN | BODY MASS INDEX: 19.99 KG/M2 | SYSTOLIC BLOOD PRESSURE: 144 MMHG | RESPIRATION RATE: 16 BRPM | OXYGEN SATURATION: 98 % | HEART RATE: 61 BPM | TEMPERATURE: 97.7 F

## 2017-04-14 PROBLEM — I49.3 PVCS (PREMATURE VENTRICULAR CONTRACTIONS): Status: ACTIVE | Noted: 2017-04-14

## 2017-04-14 LAB
ANION GAP BLD CALC-SCNC: 6 MMOL/L (ref 5–15)
BACTERIA SPEC CULT: NORMAL
BUN SERPL-MCNC: 17 MG/DL (ref 6–20)
BUN/CREAT SERPL: 30 (ref 12–20)
C JEJUNI+C COLI AG STL QL: NEGATIVE
CALCIUM SERPL-MCNC: 8.2 MG/DL (ref 8.5–10.1)
CHLORIDE SERPL-SCNC: 107 MMOL/L (ref 97–108)
CO2 SERPL-SCNC: 26 MMOL/L (ref 21–32)
CREAT SERPL-MCNC: 0.57 MG/DL (ref 0.55–1.02)
E COLI SXT1+2 STL IA: NEGATIVE
ERYTHROCYTE [DISTWIDTH] IN BLOOD BY AUTOMATED COUNT: 12.8 % (ref 11.5–14.5)
GLUCOSE SERPL-MCNC: 80 MG/DL (ref 65–100)
HCT VFR BLD AUTO: 34.3 % (ref 35–47)
HGB BLD-MCNC: 11.3 G/DL (ref 11.5–16)
MCH RBC QN AUTO: 29.6 PG (ref 26–34)
MCHC RBC AUTO-ENTMCNC: 32.9 G/DL (ref 30–36.5)
MCV RBC AUTO: 89.8 FL (ref 80–99)
PLATELET # BLD AUTO: 195 K/UL (ref 150–400)
POTASSIUM SERPL-SCNC: 3.8 MMOL/L (ref 3.5–5.1)
RBC # BLD AUTO: 3.82 M/UL (ref 3.8–5.2)
SERVICE CMNT-IMP: NORMAL
SODIUM SERPL-SCNC: 139 MMOL/L (ref 136–145)
WBC # BLD AUTO: 6.5 K/UL (ref 3.6–11)

## 2017-04-14 PROCEDURE — 74011250636 HC RX REV CODE- 250/636: Performed by: INTERNAL MEDICINE

## 2017-04-14 PROCEDURE — 36415 COLL VENOUS BLD VENIPUNCTURE: CPT | Performed by: INTERNAL MEDICINE

## 2017-04-14 PROCEDURE — 85027 COMPLETE CBC AUTOMATED: CPT | Performed by: INTERNAL MEDICINE

## 2017-04-14 PROCEDURE — 80048 BASIC METABOLIC PNL TOTAL CA: CPT | Performed by: INTERNAL MEDICINE

## 2017-04-14 PROCEDURE — 74011250637 HC RX REV CODE- 250/637: Performed by: INTERNAL MEDICINE

## 2017-04-14 PROCEDURE — 74011250637 HC RX REV CODE- 250/637: Performed by: FAMILY MEDICINE

## 2017-04-14 RX ORDER — METRONIDAZOLE 500 MG/1
500 TABLET ORAL 3 TIMES DAILY
Qty: 20 TAB | Refills: 0 | Status: SHIPPED | OUTPATIENT
Start: 2017-04-14 | End: 2017-04-25

## 2017-04-14 RX ADMIN — FAMOTIDINE 20 MG: 20 TABLET ORAL at 09:57

## 2017-04-14 RX ADMIN — Medication 10 ML: at 05:01

## 2017-04-14 RX ADMIN — ASPIRIN 81 MG: 81 TABLET, COATED ORAL at 09:57

## 2017-04-14 RX ADMIN — SODIUM CHLORIDE 25 ML/HR: 900 INJECTION, SOLUTION INTRAVENOUS at 00:55

## 2017-04-14 RX ADMIN — METRONIDAZOLE 500 MG: 250 TABLET ORAL at 04:58

## 2017-04-14 RX ADMIN — FLECAINIDE ACETATE 100 MG: 100 TABLET ORAL at 09:57

## 2017-04-14 RX ADMIN — POTASSIUM CHLORIDE 10 MEQ: 750 TABLET, FILM COATED, EXTENDED RELEASE ORAL at 09:58

## 2017-04-14 RX ADMIN — LISINOPRIL 20 MG: 20 TABLET ORAL at 09:57

## 2017-04-14 NOTE — PROGRESS NOTES
Discharge note:  Pt leaving unit via wheelchair with volunteer assistance, granddaughter downstairs with private vehicle. AAOx4, VSS, PIV removed. D/C instructions reviewed, to include follow-up appointment, dietary considerations, and medication regimen. Prescription given for flagyl. All questions addressed.

## 2017-04-14 NOTE — PROGRESS NOTES
Window blinds fell on patient. Dr. Katty Negron notified. No visible injury, patient states that she is fine. No orders at this time.

## 2017-04-14 NOTE — PROGRESS NOTES
Bedside shift change report given to Hiren Ham (oncoming nurse) by Evaristo Galicia (offgoing nurse). Report included the following information SBAR, Kardex, ED Summary, Procedure Summary, Intake/Output and MAR.

## 2017-04-14 NOTE — DISCHARGE SUMMARY
PCP.   Seen and examined. Labs reviewed. She is anxious for d/c. She appears stable for discharge. Instructions reviewed with pt and by phone w/ grand daughter.

## 2017-04-14 NOTE — PROGRESS NOTES
Bedside and Verbal shift change report given to KENDRA Rice (oncoming nurse) by Tomer Abraham RN (offgoing nurse). Report included the following information SBAR, Kardex, Intake/Output, MAR, Accordion, Recent Results and Med Rec Status.

## 2017-04-14 NOTE — DISCHARGE INSTRUCTIONS
Patient Discharge Instructions    Ele Singletary / [id number] : 8/10/1926    Admitted 2017 Discharged: 2017     Take Home Medications       · It is important that you take the medication exactly as they are prescribed. · Keep your medication in the bottles provided by the pharmacist and keep a list of the medication names, dosages, and times to be taken in your wallet. · Do not take other medications without consulting your doctor. What to do at Home    Recommended diet: soft     Recommended activity: walk carefully. No driving a car for now  Rest. OK to go to [facility]    If you experience any of the following symptoms : bad abdominal pain , rectal bleeding, please follow up with Dr. Sierra Donato at [phone]     Follow-up with Dr. Alex Kendrick in the office in 1 week's time or sooner as needed. Call for an appointment         Information obtained by :  I understand that if any problems occur once I am at home I am to contact my physician. I understand and acknowledge receipt of the instructions indicated above.                                                                                                                                            Physician's or R.N.'s Signature                                                                  Date/Time                                                                                                                                              Patient or Representative Signature                                                          Date/Time

## 2017-04-17 NOTE — DISCHARGE SUMMARY
[address] [facility] 12, [address] [facility] SUMMARY       Name:  Elmer Ross   MR#:  [id number]   :  08/10/1926   Account #:  [de-identified]        Date of Adm:  2017       DISCHARGE DIAGNOSES   1. Probable ischemic colitis. 2. Hypertension. 3. Peripheral vascular disease. 4. Hyperlipidemia. 5. Premature ventricular contractions. 6. Transient hypokalemia. DISCHARGE INSTRUCTIONS:   1. Flagyl 500 mg p.o. t.i.d.   2. Centrum Silver one daily. 3. Calcium 600 plus vitamin D 1 daily. 4. Amlodipine 2.5 mg p.o. daily p.r.n. systolic blood pressure greater   than 160.   5. Tambocor 100 mg p.o. b.i.d.   6. Doxazosin 0.5 mg at bedtime. 7. Mevacor 20 mg daily,   8. Hydrocodone 5/acetaminophen 325 one p.o. b.i.d. p.r.n. pain. 9. Protonix 4 mg p.o. daily. 10. Diazepam 2 mg p.o. daily as needed. 11. Lisinopril 20 mg daily. 12. Soft diet. 13. Gradually increase activity. 14. Call Dr. Love Gonzalez for worsening abdominal pain or rectal bleeding,   otherwise followup Dr. Love Gonzalez in 1 weeks time. CHIEF COMPLAINT: A 49-year-old white female presenting with lower   abdominal pain. HISTORY OF PRESENT ILLNESS: The patient has had multiple   hospitalizations for colitis. She has had C Diff in the past and ischemic   colitis in the past. She has currently been experiencing 24 hours of   lower mid abdominal pain with frequent non-bloody diarrhea also with   some lightheadedness with stools. She is admitted as a full inpatient   admission by Dr. Alex Kathleen on call for me.     PAST MEDICAL HISTORY: Acute ischemic colitis, C Diff, diarrhea,   paroxysmal atrial fibrillation, sick sinus syndrome, moderate carotid   stenosis right greater than left, GERD, hypertension, non-obstructive   coronary artery disease on cardiac cath , renal artery stents were   open, left lower quadrant abdominal pain probably due to spasm,   hypercholesterolemia, osteoporosis, rhinitis, paroxysmal atrial   fibrillation, sick sinus syndrome. PAST SURGICAL HISTORY: Colonoscopy 2011, diverticulosis,   hemorrhoids, cholecystectomy, hysterectomy, peripheral vascular   stenting in the left leg, tonsillectomy. MEDICATIONS   1. Centrum Silver one daily. 2. MiraLax 17 grams p.o. daily as needed. 3. Calcium 600 plus D 1 p.o. daily. 4. Amlodipine 2.5 mg as needed for systolic blood pressure of greater   than 160.   5. Flecainide 100 mg p.o. b.i.d.   6. Doxazosin 0.5 mg p.o. at bedtime. 7. Lovastatin 20 mg p.o. daily. 8. Hydrocodone 5/acetaminophen 325 p.o. b.i.d. p.r.n.   9. Protonix 40 mg daily. 10. Diazepam 200 mg daily as needed. 11. Lisinopril 20 mg daily. 12. Flonase 2 sprays per each nostril daily as needed for rhinitis. 13. Amlodipine 5 mg nightly. 14. Loratadine 10 mg daily. ALLERGIES   1. BETA BLOCKERS. 2. CODEINE. 3. DONNATAL. 4. EXELON. 5. PLAVIX. 6. SULAR. FAMILY HISTORY: Hypertension in her mother. SOCIAL HISTORY: Negative smoking. Negative ETOH. She has a   supportive granddaughter who is a nurse who lives in [place], [place]   but checks on her regularly. She has a daughter that lives in the   [facility] region. REVIEW OF SYSTEMS: Otherwise, comprehensive review of systems   is negative except for that written in history of present illness. PHYSICAL EXAMINATION   VITAL SIGNS: 195/63, 66, 97.9, 16, weight 120, height 5 feet 5 inches,   O2 saturation 96%, BMI 19.97 kilograms per meter squared. HEENT: Negative. LUNGS: Clear to auscultation bilaterally. HEART: Regular rate and rhythm. S1, S2 normal. No murmur, click,   rub or gallop. ABDOMEN: Mild lower abdominal tenderness. Positive bowel sounds. EXTREMITIES: Normal, atraumatic, no cyanosis or edema.    NEUROLOGIC: Grossly normal.    DATA: CT abdomen with contrast colonic wall thickening and pyloric   colitis either infectious, inflammatory or ischemic in etiology,   unchanged biliary dilatation compared with pyloric previous   cholecystectomy. Serum white count 12.1, 76% neutrophils, 16% lymphocytes, 7%   monocytes, 1% eosinophils. Hemoglobin 12.7, hematocrit 38.2, platelet   count 464. Coags normal. Potassium 3.4. Anion gap none. Serum CO2   of 25. Remainder of CMP is normal. Lipase 113. Normal lactic acid 1.1. Urinalysis negative. HOSPITAL COURSE: The patient was admitted to remote telemetry,   full inpatient admission. Given IV fluids, IV hydration, IV Flagyl, clear   liquid diet. She had a small amount of blood per rectum in the hospital which resolved early in the hospitalization . Stool culture and stool for C Diff were both negative. Tuan Harman MD, colorectal specialist, saw the patient in   consultation and participated in her care. He agreed that she most   likely had ischemic colitis. Clinically she improved. Her diet was   advanced. She experienced some  Asymptomatic PVCs. Potassium level was   Supplemented, and her level normalized. Her magnesium level was normal. She improved, her diet was advanced,  and she was discharged in improved condition on 04/14/2017.         Juarez Calderon MD      [facility]   D:  04/16/2017   17:23   T:  04/17/2017   08:15   Job #:  [id number]

## 2017-04-25 ENCOUNTER — HOSPITAL ENCOUNTER (INPATIENT)
Age: 82
LOS: 3 days | Discharge: HOME HEALTH | DRG: 395 | End: 2017-04-28
Attending: EMERGENCY MEDICINE | Admitting: FAMILY MEDICINE
Payer: MEDICARE

## 2017-04-25 DIAGNOSIS — R55 SYNCOPE, UNSPECIFIED SYNCOPE TYPE: Primary | ICD-10-CM

## 2017-04-25 DIAGNOSIS — K55.9 ISCHEMIC COLITIS (HCC): ICD-10-CM

## 2017-04-25 LAB
ALBUMIN SERPL BCP-MCNC: 3.1 G/DL (ref 3.5–5)
ALBUMIN/GLOB SERPL: 0.9 {RATIO} (ref 1.1–2.2)
ALP SERPL-CCNC: 76 U/L (ref 45–117)
ALT SERPL-CCNC: 26 U/L (ref 12–78)
ANION GAP BLD CALC-SCNC: 7 MMOL/L (ref 5–15)
AST SERPL W P-5'-P-CCNC: 34 U/L (ref 15–37)
ATRIAL RATE: 68 BPM
BASOPHILS # BLD AUTO: 0 K/UL (ref 0–0.1)
BASOPHILS # BLD: 1 % (ref 0–1)
BILIRUB SERPL-MCNC: 0.3 MG/DL (ref 0.2–1)
BUN SERPL-MCNC: 21 MG/DL (ref 6–20)
BUN/CREAT SERPL: 21 (ref 12–20)
CALCIUM SERPL-MCNC: 8.4 MG/DL (ref 8.5–10.1)
CALCULATED P AXIS, ECG09: 60 DEGREES
CALCULATED R AXIS, ECG10: -58 DEGREES
CALCULATED T AXIS, ECG11: 87 DEGREES
CHLORIDE SERPL-SCNC: 101 MMOL/L (ref 97–108)
CK SERPL-CCNC: 74 U/L (ref 26–192)
CO2 SERPL-SCNC: 29 MMOL/L (ref 21–32)
CREAT SERPL-MCNC: 1.01 MG/DL (ref 0.55–1.02)
DIAGNOSIS, 93000: NORMAL
EOSINOPHIL # BLD: 0.1 K/UL (ref 0–0.4)
EOSINOPHIL NFR BLD: 1 % (ref 0–7)
ERYTHROCYTE [DISTWIDTH] IN BLOOD BY AUTOMATED COUNT: 13.3 % (ref 11.5–14.5)
GLOBULIN SER CALC-MCNC: 3.3 G/DL (ref 2–4)
GLUCOSE SERPL-MCNC: 126 MG/DL (ref 65–100)
HCT VFR BLD AUTO: 38 % (ref 35–47)
HGB BLD-MCNC: 12.5 G/DL (ref 11.5–16)
LIPASE SERPL-CCNC: 207 U/L (ref 73–393)
LYMPHOCYTES # BLD AUTO: 40 % (ref 12–49)
LYMPHOCYTES # BLD: 2.4 K/UL (ref 0.8–3.5)
MCH RBC QN AUTO: 30 PG (ref 26–34)
MCHC RBC AUTO-ENTMCNC: 32.9 G/DL (ref 30–36.5)
MCV RBC AUTO: 91.3 FL (ref 80–99)
MONOCYTES # BLD: 0.7 K/UL (ref 0–1)
MONOCYTES NFR BLD AUTO: 12 % (ref 5–13)
NEUTS SEG # BLD: 2.8 K/UL (ref 1.8–8)
NEUTS SEG NFR BLD AUTO: 46 % (ref 32–75)
P-R INTERVAL, ECG05: 184 MS
PLATELET # BLD AUTO: 282 K/UL (ref 150–400)
POTASSIUM SERPL-SCNC: 3.5 MMOL/L (ref 3.5–5.1)
PROT SERPL-MCNC: 6.4 G/DL (ref 6.4–8.2)
Q-T INTERVAL, ECG07: 476 MS
QRS DURATION, ECG06: 142 MS
QTC CALCULATION (BEZET), ECG08: 506 MS
RBC # BLD AUTO: 4.16 M/UL (ref 3.8–5.2)
SODIUM SERPL-SCNC: 137 MMOL/L (ref 136–145)
TROPONIN I SERPL-MCNC: <0.04 NG/ML
VENTRICULAR RATE, ECG03: 68 BPM
WBC # BLD AUTO: 6 K/UL (ref 3.6–11)

## 2017-04-25 PROCEDURE — 74011000250 HC RX REV CODE- 250: Performed by: FAMILY MEDICINE

## 2017-04-25 PROCEDURE — 65660000000 HC RM CCU STEPDOWN

## 2017-04-25 PROCEDURE — 74011250636 HC RX REV CODE- 250/636: Performed by: EMERGENCY MEDICINE

## 2017-04-25 PROCEDURE — 93005 ELECTROCARDIOGRAM TRACING: CPT

## 2017-04-25 PROCEDURE — 96374 THER/PROPH/DIAG INJ IV PUSH: CPT

## 2017-04-25 PROCEDURE — 96361 HYDRATE IV INFUSION ADD-ON: CPT

## 2017-04-25 PROCEDURE — 80053 COMPREHEN METABOLIC PANEL: CPT | Performed by: EMERGENCY MEDICINE

## 2017-04-25 PROCEDURE — 83690 ASSAY OF LIPASE: CPT | Performed by: EMERGENCY MEDICINE

## 2017-04-25 PROCEDURE — 84484 ASSAY OF TROPONIN QUANT: CPT | Performed by: EMERGENCY MEDICINE

## 2017-04-25 PROCEDURE — 74011250637 HC RX REV CODE- 250/637: Performed by: FAMILY MEDICINE

## 2017-04-25 PROCEDURE — 74011250636 HC RX REV CODE- 250/636

## 2017-04-25 PROCEDURE — 74011250636 HC RX REV CODE- 250/636: Performed by: FAMILY MEDICINE

## 2017-04-25 PROCEDURE — 96375 TX/PRO/DX INJ NEW DRUG ADDON: CPT

## 2017-04-25 PROCEDURE — 85025 COMPLETE CBC W/AUTO DIFF WBC: CPT | Performed by: EMERGENCY MEDICINE

## 2017-04-25 PROCEDURE — 36415 COLL VENOUS BLD VENIPUNCTURE: CPT | Performed by: EMERGENCY MEDICINE

## 2017-04-25 PROCEDURE — 99285 EMERGENCY DEPT VISIT HI MDM: CPT

## 2017-04-25 PROCEDURE — 82550 ASSAY OF CK (CPK): CPT | Performed by: EMERGENCY MEDICINE

## 2017-04-25 RX ORDER — ONDANSETRON 2 MG/ML
4 INJECTION INTRAMUSCULAR; INTRAVENOUS
Status: COMPLETED | OUTPATIENT
Start: 2017-04-25 | End: 2017-04-25

## 2017-04-25 RX ORDER — ASPIRIN 81 MG/1
81 TABLET ORAL DAILY
Status: DISCONTINUED | OUTPATIENT
Start: 2017-04-26 | End: 2017-04-28 | Stop reason: HOSPADM

## 2017-04-25 RX ORDER — HYDROCODONE BITARTRATE AND ACETAMINOPHEN 5; 325 MG/1; MG/1
1 TABLET ORAL
Status: DISCONTINUED | OUTPATIENT
Start: 2017-04-25 | End: 2017-04-28 | Stop reason: HOSPADM

## 2017-04-25 RX ORDER — METRONIDAZOLE 500 MG/100ML
500 INJECTION, SOLUTION INTRAVENOUS EVERY 8 HOURS
Status: DISCONTINUED | OUTPATIENT
Start: 2017-04-25 | End: 2017-04-27

## 2017-04-25 RX ORDER — DIAZEPAM 2 MG/1
2 TABLET ORAL
Status: DISCONTINUED | OUTPATIENT
Start: 2017-04-25 | End: 2017-04-28 | Stop reason: HOSPADM

## 2017-04-25 RX ORDER — LISINOPRIL 10 MG/1
20 TABLET ORAL DAILY
Status: DISCONTINUED | OUTPATIENT
Start: 2017-04-26 | End: 2017-04-28 | Stop reason: HOSPADM

## 2017-04-25 RX ORDER — DOXAZOSIN 2 MG/1
1 TABLET ORAL DAILY
Status: DISCONTINUED | OUTPATIENT
Start: 2017-04-26 | End: 2017-04-26

## 2017-04-25 RX ORDER — FLECAINIDE ACETATE 100 MG/1
100 TABLET ORAL 2 TIMES DAILY
Status: DISCONTINUED | OUTPATIENT
Start: 2017-04-25 | End: 2017-04-28 | Stop reason: HOSPADM

## 2017-04-25 RX ORDER — SODIUM CHLORIDE 0.9 % (FLUSH) 0.9 %
5-10 SYRINGE (ML) INJECTION AS NEEDED
Status: DISCONTINUED | OUTPATIENT
Start: 2017-04-25 | End: 2017-04-28 | Stop reason: HOSPADM

## 2017-04-25 RX ORDER — PANTOPRAZOLE SODIUM 40 MG/1
40 TABLET, DELAYED RELEASE ORAL
Status: DISCONTINUED | OUTPATIENT
Start: 2017-04-26 | End: 2017-04-28 | Stop reason: HOSPADM

## 2017-04-25 RX ORDER — MORPHINE SULFATE 4 MG/ML
INJECTION, SOLUTION INTRAMUSCULAR; INTRAVENOUS
Status: COMPLETED
Start: 2017-04-25 | End: 2017-04-25

## 2017-04-25 RX ORDER — ONDANSETRON 2 MG/ML
INJECTION INTRAMUSCULAR; INTRAVENOUS
Status: COMPLETED
Start: 2017-04-25 | End: 2017-04-25

## 2017-04-25 RX ORDER — AMLODIPINE BESYLATE 5 MG/1
2.5 TABLET ORAL AS NEEDED
Status: DISCONTINUED | OUTPATIENT
Start: 2017-04-25 | End: 2017-04-28 | Stop reason: HOSPADM

## 2017-04-25 RX ORDER — LOVASTATIN 20 MG/1
20 TABLET ORAL
Status: DISCONTINUED | OUTPATIENT
Start: 2017-04-25 | End: 2017-04-28 | Stop reason: HOSPADM

## 2017-04-25 RX ORDER — SODIUM CHLORIDE 0.9 % (FLUSH) 0.9 %
5-10 SYRINGE (ML) INJECTION EVERY 8 HOURS
Status: DISCONTINUED | OUTPATIENT
Start: 2017-04-25 | End: 2017-04-28 | Stop reason: HOSPADM

## 2017-04-25 RX ORDER — MORPHINE SULFATE 4 MG/ML
4 INJECTION, SOLUTION INTRAMUSCULAR; INTRAVENOUS
Status: COMPLETED | OUTPATIENT
Start: 2017-04-25 | End: 2017-04-25

## 2017-04-25 RX ORDER — SODIUM CHLORIDE 9 MG/ML
1000 INJECTION, SOLUTION INTRAVENOUS CONTINUOUS
Status: DISCONTINUED | OUTPATIENT
Start: 2017-04-25 | End: 2017-04-27

## 2017-04-25 RX ORDER — ENOXAPARIN SODIUM 100 MG/ML
40 INJECTION SUBCUTANEOUS EVERY 24 HOURS
Status: DISCONTINUED | OUTPATIENT
Start: 2017-04-25 | End: 2017-04-26

## 2017-04-25 RX ORDER — AMLODIPINE BESYLATE 5 MG/1
5 TABLET ORAL
Status: DISCONTINUED | OUTPATIENT
Start: 2017-04-25 | End: 2017-04-28 | Stop reason: HOSPADM

## 2017-04-25 RX ORDER — PROCHLORPERAZINE EDISYLATE 5 MG/ML
10 INJECTION INTRAMUSCULAR; INTRAVENOUS
Status: COMPLETED | OUTPATIENT
Start: 2017-04-25 | End: 2017-04-25

## 2017-04-25 RX ADMIN — PROCHLORPERAZINE EDISYLATE 10 MG: 5 INJECTION INTRAMUSCULAR; INTRAVENOUS at 17:17

## 2017-04-25 RX ADMIN — AMLODIPINE BESYLATE 5 MG: 5 TABLET ORAL at 22:31

## 2017-04-25 RX ADMIN — FLECAINIDE ACETATE 100 MG: 100 TABLET ORAL at 22:32

## 2017-04-25 RX ADMIN — Medication 4 MG: at 16:38

## 2017-04-25 RX ADMIN — DIAZEPAM 2 MG: 2 TABLET ORAL at 22:32

## 2017-04-25 RX ADMIN — ONDANSETRON 4 MG: 2 INJECTION INTRAMUSCULAR; INTRAVENOUS at 16:37

## 2017-04-25 RX ADMIN — Medication 10 ML: at 22:32

## 2017-04-25 RX ADMIN — SODIUM CHLORIDE 1000 ML: 900 INJECTION, SOLUTION INTRAVENOUS at 18:33

## 2017-04-25 RX ADMIN — SODIUM CHLORIDE 1000 ML: 900 INJECTION, SOLUTION INTRAVENOUS at 16:12

## 2017-04-25 RX ADMIN — MORPHINE SULFATE 4 MG: 4 INJECTION, SOLUTION INTRAMUSCULAR; INTRAVENOUS at 16:38

## 2017-04-25 RX ADMIN — LOVASTATIN 20 MG: 20 TABLET ORAL at 22:32

## 2017-04-25 RX ADMIN — SODIUM CHLORIDE 1000 ML: 900 INJECTION, SOLUTION INTRAVENOUS at 21:00

## 2017-04-25 RX ADMIN — METRONIDAZOLE 500 MG: 500 INJECTION, SOLUTION INTRAVENOUS at 18:34

## 2017-04-25 RX ADMIN — ENOXAPARIN SODIUM 40 MG: 40 INJECTION SUBCUTANEOUS at 22:30

## 2017-04-25 NOTE — IP AVS SNAPSHOT
Current Discharge Medication List  
  
START taking these medications Dose & Instructions Dispensing Information Comments Morning Noon Evening Bedtime  
 metroNIDAZOLE 500 mg tablet Commonly known as:  FLAGYL Your last dose was: Your next dose is:    
   
   
 Dose:  500 mg Take 1 Tab by mouth three (3) times daily. Quantity:  12 Tab Refills:  0 CONTINUE these medications which have NOT CHANGED Dose & Instructions Dispensing Information Comments Morning Noon Evening Bedtime  
 aspirin delayed-release 81 mg tablet Your last dose was: Your next dose is:    
   
   
 Dose:  81 mg Take 81 mg by mouth daily. Refills:  0  
     
   
   
   
  
 CALCIUM 600 + D 600-125 mg-unit Tab Generic drug:  calcium-cholecalciferol (d3) Your last dose was: Your next dose is:    
   
   
 Dose:  1 Tab Take 1 Tab by mouth daily. Refills:  0 CENTRUM SILVER PO Your last dose was: Your next dose is:    
   
   
 Dose:  1 Tab Take 1 Tab by mouth daily. Refills:  0 CLARITIN 10 mg tablet Generic drug:  loratadine Your last dose was: Your next dose is:    
   
   
 Dose:  10 mg Take 10 mg by mouth daily as needed. Refills:  0  
     
   
   
   
  
 diazePAM 2 mg tablet Commonly known as:  VALIUM Your last dose was: Your next dose is: TAKE 1 TABLET BY MOUTH EVERY DAY AS NEEDED Quantity:  30 Tab Refills:  0  
     
   
   
   
  
 doxazosin 1 mg tablet Commonly known as:  CARDURA Your last dose was: Your next dose is:    
   
   
 1/2 tablet po qhs  
 Quantity:  45 Tab Refills:  PRN  
     
   
   
   
  
 flecainide 100 mg tablet Commonly known as:  TAMBOCOR Your last dose was: Your next dose is:    
   
   
 Dose:  100 mg Take 100 mg by mouth two (2) times a day. Refills:  0  
     
   
   
   
  
 FLONASE 50 mcg/actuation nasal spray Generic drug:  fluticasone Your last dose was: Your next dose is:    
   
   
 Dose:  2 Spray 2 Sprays by Both Nostrils route as needed for Rhinitis. Refills:  0 HYDROcodone-acetaminophen 5-325 mg per tablet Commonly known as:  Lele Enriqueze Your last dose was: Your next dose is:    
   
   
 Dose:  1 Tab Take 1 Tab by mouth two (2) times daily as needed for Pain. Max Daily Amount: 2 Tabs. Indications: Pain Quantity:  60 Tab Refills:  0  
     
   
   
   
  
 lisinopril 20 mg tablet Commonly known as:  Tanda Limerick Your last dose was: Your next dose is: TAKE 1 TABLET DAILY Quantity:  90 Tab Refills:  3  
     
   
   
   
  
 lovastatin 20 mg tablet Commonly known as:  MEVACOR Your last dose was: Your next dose is: TAKE 1 TABLET DAILY Quantity:  90 Tab Refills:  0 MIRALAX 17 gram/dose powder Generic drug:  polyethylene glycol Your last dose was: Your next dose is:    
   
   
 Dose:  17 g Take 17 g by mouth daily. As needed Refills:  0  
     
   
   
   
  
 * NORVASC 5 mg tablet Generic drug:  amLODIPine Your last dose was: Your next dose is:    
   
   
 Dose:  5 mg Take 5 mg by mouth nightly. Refills:  0  
     
   
   
   
  
 * NORVASC 2.5 mg tablet Generic drug:  amLODIPine Your last dose was: Your next dose is:    
   
   
 Dose:  2.5 mg Take 2.5 mg by mouth as needed (SBP > 160). Refills:  0  
     
   
   
   
  
 pantoprazole 40 mg tablet Commonly known as:  PROTONIX Your last dose was: Your next dose is: TAKE 1 TABLET DAILY IN PLACE OF OMEPRAZOLE Quantity:  90 Tab Refills:  1  
     
   
   
   
  
 * Notice:   This list has 2 medication(s) that are the same as other medications prescribed for you. Read the directions carefully, and ask your doctor or other care provider to review them with you. Where to Get Your Medications These medications were sent to [address] [facility], [address], [address] [facility] AT [facility] [address], [address] [facility] Hours:  24-hours Phone:  [phone]  
  metroNIDAZOLE 500 mg tablet

## 2017-04-25 NOTE — ED TRIAGE NOTES
Triage note: pt recently discharged for ischemia colitis. Pt started with left lower abdomen pain, diarrhea. Pt pale with syncope.

## 2017-04-25 NOTE — ED NOTES
Pt incontinent of moderate amount loose/liquid brown stool, pt cleaned and depends changed, pt tolerated well

## 2017-04-25 NOTE — ED PROVIDER NOTES
HPI Comments: 80 y.o. female with past medical history significant for osteoporosis, hypercholesterolemia, arrhythmia, HTN, acute ischemic colitis, SSS, PAF, osteoporosis, GERD, bilateral carotid stenosis, and s/p renal artery stents and non-obstructive CAD on cardiac cath who presents from home via EMS with chief complaint of diarrhea. Pt's family reports that the pt had onset of severe LLQ abdominal pain after eating lunch. Pt then proceeded to a normal BM then had an episode of syncope. Pt appeared pale to family at that time and less responsive than normal. Pt's BP was measured to be in the 80's with pulse rate in the 50's. Pt has since developed incontinence of stool, with \"watery\" diarrhea. Pt was negative for C.diff 2 weeks ago. Family reports that the pt had similar sx when she was recently admitted for ischemic colitis, but pt seems \"sicker\" today. Pt was discharged 1 week ago. She was treated with fluids, pain medication, and flagyl. Pt is followed by GI but they have been taking a conservative approach to treatment given the pt's age. It is also noted that the pt's heart rate stays in the 50's. There are no other acute medical concerns at this time. Social hx: Never smoker. No alcohol use. Significant FMHx: both sisters have sick sinus syndrome. PCP: Miguel Nicholas MD    Old Chart Review: Pt was admitted 4/11 - 4/14/17 for probable ischemic colitis after presenting to the ED with rectal bleeding after a night of diarrhea, abdominal pain, and lightheadedness. Pt was treated with IV fluids, IV flagyl, and clear liquid diet. As pt improved, diet was advanced until pt was ready for d/c home. Note written by Bertin Tran, as dictated by Supriya Steinberg MD 4:10 PM      The history is provided by the patient and a relative.         Past Medical History:   Diagnosis Date    Acute ischemic colitis ([address]     Arrhythmia     Paroxysmal  Afib,  SSS    Carotid stenosis, bilateral     moderate , right >left    GERD (gastroesophageal reflux disease) 12/10/2011    HTN (hypertension) 12/8/2011    HX OTHER MEDICAL 2011    cardiac cath ;non obstructive CAD. Renal artery stents; open    HX OTHER MEDICAL 2011    LLQ abd. pain thought due to spasm.  Hypercholesterolemia     Hypertension     Osteoporosis     Osteoporosis 12/8/2011    PAF (paroxysmal atrial fibrillation) ([facility] [address] 12/8/2011    Rhinitis 12/8/2011    SSS (sick sinus syndrome) ([facility] [address] 12/8/2011       Past Surgical History:   Procedure Laterality Date    ENDOSCOPY, COLON, DIAGNOSTIC  3/2/11    diverticulosis,hemorrhoids    HX CHOLECYSTECTOMY      HX HYSTERECTOMY      1960    HX OTHER SURGICAL      stent left leg    HX TONSILLECTOMY           Family History:   Problem Relation Age of Onset    Hypertension Mother        Social History     Social History    Marital status:      Spouse name: N/A    Number of children: N/A    Years of education: N/A     Occupational History    Not on file. Social History Main Topics    Smoking status: Never Smoker    Smokeless tobacco: Not on file    Alcohol use No    Drug use: No    Sexual activity: Not on file     Other Topics Concern    Not on file     Social History Narrative         ALLERGIES: Beta blocker [beta-blockers (beta-adrenergic blocking agts)]; Codeine; Donnatal [phenobarb-hyoscy-atropine-scop]; Exelon [rivastigmine]; Plavix [clopidogrel]; and Sular [nisoldipine]    Review of Systems   Constitutional: Negative for fever. Cardiovascular: Negative for chest pain. Gastrointestinal: Positive for abdominal pain, diarrhea and nausea. Negative for vomiting. Musculoskeletal: Negative for back pain and neck pain. Skin: Positive for pallor. Negative for wound. Neurological: Positive for syncope. All other systems reviewed and are negative.       Vitals:    04/25/17 1541   BP: (P) 172/58   Pulse: 65   Resp: 30   Temp: 97.7 °F (36.5 °C)   SpO2: 98% Weight: 57.8 kg (127 lb 6 oz)   Height: 5' 6\" (1.676 m)            Physical Exam   Constitutional: She is oriented to person, place, and time. She appears well-developed and well-nourished. No distress. Elderly. HENT:   Head: Normocephalic and atraumatic. Eyes: Conjunctivae are normal. No scleral icterus. Neck: Neck supple. No tracheal deviation present. Cardiovascular: Normal rate, regular rhythm, normal heart sounds and intact distal pulses. Exam reveals no gallop and no friction rub. No murmur heard. Pulmonary/Chest: Effort normal and breath sounds normal. She has no wheezes. She has no rales. Abdominal: Soft. She exhibits no distension. Minimal abd tenderness to palpation without guarding or rebound    Musculoskeletal: She exhibits no edema. Neurological: She is alert and oriented to person, place, and time. Skin: Skin is warm and dry. No rash noted. There is pallor. Psychiatric: She has a normal mood and affect. Nursing note and vitals reviewed. Note written by Bertin Albrecht, as dictated by Sakshi Parsons MD 4:18 PM      MDM  Number of Diagnoses or Management Options     Amount and/or Complexity of Data Reviewed  Clinical lab tests: ordered and reviewed  Tests in the radiology section of CPT®: ordered and reviewed  Tests in the medicine section of CPT®: ordered and reviewed  Discussion of test results with the performing providers: yes  Obtain history from someone other than the patient: yes  Discuss the patient with other providers: yes    Risk of Complications, Morbidity, and/or Mortality  Presenting problems: moderate  Diagnostic procedures: moderate  Management options: moderate    Patient Progress  Patient progress: improved    ED Course       Procedures       CONSULT NOTE:  4:17 PM Sakshi Parsons MD spoke with Dr. Venus Ojeda, Consult for PCP. Discussed available diagnostic tests and clinical findings. He is in agreement with care plans as outlined. Dr. Shayy Lott will come to the ED to evaluate the pt for admission. PROGRESS NOTE:  5:14 PM  Dr. Shayy Lott has been to the ED and accepted the pt for admission.

## 2017-04-25 NOTE — PROGRESS NOTES
Admission Medication Reconciliation:    Information obtained from: family member    Significant PMH/Disease States:   Past Medical History:   Diagnosis Date    Acute ischemic colitis ([facility] [address]     Arrhythmia     Paroxysmal  Afib,  SSS    Carotid stenosis, bilateral     moderate , right >left    GERD (gastroesophageal reflux disease) 12/10/2011    HTN (hypertension) 12/8/2011    HX OTHER MEDICAL 2011    cardiac cath ;non obstructive CAD. Renal artery stents; open    HX OTHER MEDICAL 2011    LLQ abd. pain thought due to spasm.  Hypercholesterolemia     Hypertension     Osteoporosis     Osteoporosis 12/8/2011    PAF (paroxysmal atrial fibrillation) ([facility] [address] 12/8/2011    Rhinitis 12/8/2011    SSS (sick sinus syndrome) ([facility] [address] 12/8/2011       Chief Complaint for this Admission:  colitis    Allergies:  Beta blocker [beta-blockers (beta-adrenergic blocking agts)]; Codeine; Donnatal [phenobarb-hyoscy-atropine-scop]; Exelon [rivastigmine]; Plavix [clopidogrel]; and Sular [nisoldipine]    Prior to Admission Medications:   Prior to Admission Medications   Prescriptions Last Dose Informant Patient Reported? Taking? FOLIC ACID/MULTIVIT-MIN/LUTEIN (CENTRUM SILVER PO) 4/25/2017 at Unknown time  Yes Yes   Sig: Take 1 Tab by mouth daily. HYDROcodone-acetaminophen (NORCO) 5-325 mg per tablet   No Yes   Sig: Take 1 Tab by mouth two (2) times daily as needed for Pain. Max Daily Amount: 2 Tabs. Indications: Pain   amLODIPine (NORVASC) 2.5 mg tablet 3/25/2017 at Unknown time  Yes Yes   Sig: Take 2.5 mg by mouth as needed (SBP > 160). amLODIPine (NORVASC) 5 mg tablet 4/24/2017 at Unknown time  Yes Yes   Sig: Take 5 mg by mouth nightly. aspirin delayed-release 81 mg tablet 4/25/2017 at Unknown time  Yes Yes   Sig: Take 81 mg by mouth daily. calcium-cholecalciferol, d3, (CALCIUM 600 + D) 600-125 mg-unit tab 4/25/2017 at Unknown time  Yes Yes   Sig: Take 1 Tab by mouth daily.    diazepam (VALIUM) 2 mg tablet 4/18/2017 at Unknown time  No Yes   Sig: TAKE 1 TABLET BY MOUTH EVERY DAY AS NEEDED   doxazosin (CARDURA) 1 mg tablet 2017 at Unknown time  No Yes   Si/2 tablet po qhs   flecainide (TAMBOCOR) 100 mg tablet 2017 at Unknown time  Yes Yes   Sig: Take 100 mg by mouth two (2) times a day. fluticasone (FLONASE) 50 mcg/actuation nasal spray   Yes Yes   Si Sprays by Both Nostrils route as needed for Rhinitis. lisinopril (PRINIVIL, ZESTRIL) 20 mg tablet 2017 at Unknown time  No Yes   Sig: TAKE 1 TABLET DAILY   loratadine (CLARITIN) 10 mg tablet 2017 at Unknown time  Yes Yes   Sig: Take 10 mg by mouth daily as needed. lovastatin (MEVACOR) 20 mg tablet 2017 at pm  No Yes   Sig: TAKE 1 TABLET DAILY   pantoprazole (PROTONIX) 40 mg tablet 2017 at Unknown time  No Yes   Sig: TAKE 1 TABLET DAILY IN PLACE OF OMEPRAZOLE   polyethylene glycol (MIRALAX) 17 gram/dose powder 2017 at Unknown time  Yes Yes   Sig: Take 17 g by mouth daily. As needed      Facility-Administered Medications: None     Comments/Recommendations: Medication review done with family member. Removed metronidazole - therapy completed. No other changes to pta med list.  Last doses updated with family member  Allergies reviewed.

## 2017-04-25 NOTE — H&P
History and Physical    Subjective:   HPI  Miguel Green is a 80 y.o.  female who presents with episode of syncope x 2 today. Had been feeling well prior to the first event. Bowels had been fine. After 2nd event had large diarrheal stool and had moderate lower abdominal pain. Sees Meghan Springer for her B renal artery stenosis with stents and HTN. .   Past Medical History:   Diagnosis Date    Acute ischemic colitis ([address]     Arrhythmia     Paroxysmal  Afib,  SSS    Carotid stenosis, bilateral     moderate , right >left    GERD (gastroesophageal reflux disease) 12/10/2011    HTN (hypertension) 12/8/2011    HX OTHER MEDICAL 2011    cardiac cath ;non obstructive CAD. Renal artery stents; open    HX OTHER MEDICAL 2011    LLQ abd. pain thought due to spasm.  Hypercholesterolemia     Hypertension     Osteoporosis     Osteoporosis 12/8/2011    PAF (paroxysmal atrial fibrillation) ([address] 12/8/2011    Rhinitis 12/8/2011    SSS (sick sinus syndrome) ([facility] 12/8/2011      Past Surgical History:   Procedure Laterality Date    ENDOSCOPY, COLON, DIAGNOSTIC  3/2/11    diverticulosis,hemorrhoids    HX CHOLECYSTECTOMY      HX HYSTERECTOMY      1960    HX OTHER SURGICAL      stent left leg    HX TONSILLECTOMY       Family History   Problem Relation Age of Onset    Hypertension Mother       Social History   Substance Use Topics    Smoking status: Never Smoker    Smokeless tobacco: Not on file    Alcohol use No       Prior to Admission medications    Medication Sig Start Date End Date Taking? Authorizing Provider   aspirin delayed-release 81 mg tablet Take  by mouth daily. Historical Provider   polyethylene glycol (MIRALAX) 17 gram/dose powder Take 17 g by mouth daily. As needed    Historical Provider   metroNIDAZOLE (FLAGYL) 500 mg tablet Take 1 Tab by mouth three (3) times daily. 4/14/17   Janes Lopez IV, MD   FOLIC ACID/MULTIVIT-MIN/LUTEIN (CENTRUM SILVER PO) Take 1 Tab by mouth daily.     Phys John Paul, MD   calcium-cholecalciferol, d3, (CALCIUM 600 + D) 600-125 mg-unit tab Take 1 Tab by mouth daily. Historical Provider   amLODIPine (NORVASC) 2.5 mg tablet Take 2.5 mg by mouth as needed (SBP > 160). Historical Provider   flecainide (TAMBOCOR) 100 mg tablet Take 100 mg by mouth two (2) times a day. Historical Provider   doxazosin (CARDURA) 1 mg tablet 1/2 tablet po qhs 4/10/17   Lynda Madera IV, MD   lovastatin (MEVACOR) 20 mg tablet TAKE 1 TABLET DAILY 4/8/17   W. Viky Aguayo IV, MD   HYDROcodone-acetaminophen [facility]) 5-325 mg per tablet Take 1 Tab by mouth two (2) times daily as needed for Pain. Max Daily Amount: 2 Tabs. Indications: Pain 4/6/17   Lynda Madera IV, MD   pantoprazole (PROTONIX) 40 mg tablet TAKE 1 TABLET DAILY IN PLACE OF OMEPRAZOLE 2/21/17   W. Viky Aguayo IV, MD   diazepam (VALIUM) 2 mg tablet TAKE 1 TABLET BY MOUTH EVERY DAY AS NEEDED 8/3/16   W. Viky Aguayo IV, MD   lisinopril (PRINIVIL, ZESTRIL) 20 mg tablet TAKE 1 TABLET DAILY 4/3/16   W. Viky Aguayo IV, MD   fluticasone (FLONASE) 50 mcg/actuation nasal spray 2 Sprays by Both Nostrils route as needed for Rhinitis. Historical Provider   amLODIPine (NORVASC) 5 mg tablet Take 5 mg by mouth nightly. Historical Provider   loratadine (CLARITIN) 10 mg tablet Take 10 mg by mouth daily as needed.     Historical Provider     Allergies   Allergen Reactions    Beta Blocker [Beta-Blockers (Beta-Adrenergic Blocking Agts)] Other (comments)     Sick sinus syndrome      Codeine Nausea Only    Donnatal [Phenobarb-Hyoscy-Atropine-Scop] Unknown (comments)    Exelon [Rivastigmine] Other (comments)     Nausea     Plavix [Clopidogrel] Rash    Sular [Nisoldipine] Vertigo      Health Maintenance   Topic Date Due    GLAUCOMA SCREENING Q2Y  08/10/1991    MEDICARE YEARLY EXAM  08/10/1991    DTaP/Tdap/Td series (1 - Tdap) 11/01/2004    INFLUENZA AGE 9 TO ADULT  08/01/2016    OSTEOPOROSIS SCREENING (DEXA) Completed    ZOSTER VACCINE AGE 60>  Completed    Pneumococcal 65+ Low/Medium Risk  Completed       Review of Systems:  A comprehensive review of systems was negative except for that written in the History of Present Illness. Objective: Intake and Output:            Physical Exam:   Visit Vitals    /57    Pulse 71    Temp 97.7 °F (36.5 °C)    Resp 22    Ht 5' 6\" (1.676 m)    Wt 127 lb 6 oz (57.8 kg)    SpO2 100%    BMI 20.56 kg/m2     Neck: supple, symmetrical, trachea midline, no adenopathy, thyroid: not enlarged, symmetric, no tenderness/mass/nodules, no carotid bruit and no JVD  Lungs: clear to auscultation bilaterally  Heart: regular rate and rhythm, S1, S2 normal, no murmur, click, rub or gallop  Abdomen: mild lower abd tenderness B. + BS  Extremities: extremities normal, atraumatic, no cyanosis or edema  Neurologic: Grossly normal    ECG:  normal EKG, normal sinus rhythm, unchanged from previous tracings, some widened QRS anteriorly.       Data Review:   Recent Results (from the past 24 hour(s))   EKG, 12 LEAD, INITIAL    Collection Time: 04/25/17  3:37 PM   Result Value Ref Range    Ventricular Rate 68 BPM    Atrial Rate 68 BPM    P-R Interval 184 ms    QRS Duration 142 ms    Q-T Interval 476 ms    QTC Calculation (Bezet) 506 ms    Calculated P Axis 60 degrees    Calculated R Axis -58 degrees    Calculated T Axis 87 degrees    Diagnosis       Sinus rhythm with occasional premature ventricular complexes  Left axis deviation  Left ventricular hypertrophy with QRS widening and repolarization abnormality  When compared with ECG of 07-OCT-2015 13:31,  premature ventricular complexes are now present  TX interval has decreased  QRS duration has increased     CBC WITH AUTOMATED DIFF    Collection Time: 04/25/17  3:44 PM   Result Value Ref Range    WBC 6.0 3.6 - 11.0 K/uL    RBC 4.16 3.80 - 5.20 M/uL    HGB 12.5 11.5 - 16.0 g/dL    HCT 38.0 35.0 - 47.0 %    MCV 91.3 80.0 - 99.0 FL    MCH 30.0 26.0 - 34.0 PG    MCHC 32.9 30.0 - 36.5 g/dL    RDW 13.3 11.5 - 14.5 %    PLATELET 485 943 - 747 K/uL    NEUTROPHILS 46 32 - 75 %    LYMPHOCYTES 40 12 - 49 %    MONOCYTES 12 5 - 13 %    EOSINOPHILS 1 0 - 7 %    BASOPHILS 1 0 - 1 %    ABS. NEUTROPHILS 2.8 1.8 - 8.0 K/UL    ABS. LYMPHOCYTES 2.4 0.8 - 3.5 K/UL    ABS. MONOCYTES 0.7 0.0 - 1.0 K/UL    ABS. EOSINOPHILS 0.1 0.0 - 0.4 K/UL    ABS. BASOPHILS 0.0 0.0 - 0.1 K/UL   METABOLIC PANEL, COMPREHENSIVE    Collection Time: 04/25/17  3:44 PM   Result Value Ref Range    Sodium 137 136 - 145 mmol/L    Potassium 3.5 3.5 - 5.1 mmol/L    Chloride 101 97 - 108 mmol/L    CO2 29 21 - 32 mmol/L    Anion gap 7 5 - 15 mmol/L    Glucose 126 (H) 65 - 100 mg/dL    BUN 21 (H) 6 - 20 MG/DL    Creatinine 1.01 0.55 - 1.02 MG/DL    BUN/Creatinine ratio 21 (H) 12 - 20      GFR est AA >60 >60 ml/min/1.73m2    GFR est non-AA 51 (L) >60 ml/min/1.73m2    Calcium 8.4 (L) 8.5 - 10.1 MG/DL    Bilirubin, total 0.3 0.2 - 1.0 MG/DL    ALT (SGPT) 26 12 - 78 U/L    AST (SGOT) 34 15 - 37 U/L    Alk. phosphatase 76 45 - 117 U/L    Protein, total 6.4 6.4 - 8.2 g/dL    Albumin 3.1 (L) 3.5 - 5.0 g/dL    Globulin 3.3 2.0 - 4.0 g/dL    A-G Ratio 0.9 (L) 1.1 - 2.2     TROPONIN I    Collection Time: 04/25/17  3:44 PM   Result Value Ref Range    Troponin-I, Qt. <0.04 <0.05 ng/mL           Assessment:     Active Problems:    Ischemic colitis ([facility] (10/21/2013)      Overview: History of this approximately 10 years ago after an acute hypotensive       event      Syncope (4/25/2017)        Plan:     1) Admit to tele- ? SSS  2) Treat for recurrent ischemic colitis, cover with IV Flagyl.     Signed By: Andie Marquez MD     April 25, 2017

## 2017-04-25 NOTE — IP AVS SNAPSHOT
[address] [place] [address] 
[phone] Patient: Ean Peck MRN: [id number] :8/10/1926 You are allergic to the following Allergen Reactions Beta Blocker (Beta-Blockers (Beta-Adrenergic Blocking Agts)) Other (comments) Sick sinus syndrome Codeine Nausea Only Donnatal (Phenobarb-Hyoscy-Atropine-Scop) Unknown (comments) Exelon (Rivastigmine) Other (comments) Nausea Plavix (Clopidogrel) Rash Sular (Nisoldipine) Vertigo Recent Documentation Height Weight Breastfeeding? BMI OB Status Smoking Status 1.676 m 66.5 kg No 23.66 kg/m2 Hysterectomy Never Smoker Emergency Contacts Name Discharge Info Relation Home Work Mobile Larry([facility])Salma DISCHARGE CAREGIVER [3] Other Relative [6] [phone] Misha,(Dtr) Magalie Isabel CAREGIVER [3] Child [2] Liv,(Son)Hiren  Child [2] Salma Juarez  Other Relative [6] [phone] About your hospitalization You were admitted on:  2017 You last received care in the:  [facility] 6S NEURO-SCI TELE You were discharged on:  2017 Unit phone number:  [phone] Why you were hospitalized Your primary diagnosis was:  Not on File Your diagnoses also included:  Ischemic Colitis (Hcc), Syncope, Htn (Hypertension) Providers Seen During Your Hospitalizations Provider Role Specialty Primary office phone Mario Cheng MD Attending Provider Emergency Medicine [phone] Aneudy Morfin MD Attending Provider Internal Medicine [phone] Your Primary Care Physician (PCP) Primary Care Physician Office Phone Office Fax [address] [phone] Follow-up Information Follow up With Details Comments Contact Info Delaney [address] [place] [address]  For home health physical therapy.  Please call agency office if you have not heard from a liaison within 24 hours of hospital discharge. [address] [place] [address] 
[phone] KIMBERLY Barros MD   [address] [address] 
[phone] Current Discharge Medication List  
  
START taking these medications Dose & Instructions Dispensing Information Comments Morning Noon Evening Bedtime  
 metroNIDAZOLE 500 mg tablet Commonly known as:  FLAGYL Your last dose was: Your next dose is:    
   
   
 Dose:  500 mg Take 1 Tab by mouth three (3) times daily. Quantity:  12 Tab Refills:  0 CONTINUE these medications which have NOT CHANGED Dose & Instructions Dispensing Information Comments Morning Noon Evening Bedtime  
 aspirin delayed-release 81 mg tablet Your last dose was: Your next dose is:    
   
   
 Dose:  81 mg Take 81 mg by mouth daily. Refills:  0  
     
   
   
   
  
 CALCIUM 600 + D 600-125 mg-unit Tab Generic drug:  calcium-cholecalciferol (d3) Your last dose was: Your next dose is:    
   
   
 Dose:  1 Tab Take 1 Tab by mouth daily. Refills:  0 CENTRUM SILVER PO Your last dose was: Your next dose is:    
   
   
 Dose:  1 Tab Take 1 Tab by mouth daily. Refills:  0 CLARITIN 10 mg tablet Generic drug:  loratadine Your last dose was: Your next dose is:    
   
   
 Dose:  10 mg Take 10 mg by mouth daily as needed. Refills:  0  
     
   
   
   
  
 diazePAM 2 mg tablet Commonly known as:  VALIUM Your last dose was: Your next dose is: TAKE 1 TABLET BY MOUTH EVERY DAY AS NEEDED Quantity:  30 Tab Refills:  0  
     
   
   
   
  
 doxazosin 1 mg tablet Commonly known as:  CARDURA Your last dose was:     
   
Your next dose is:    
   
   
 1/2 tablet po qhs  
 Quantity:  45 Tab Refills:  PRN  
     
   
   
   
  
 flecainide 100 mg tablet Commonly known as:  TAMBOCOR Your last dose was: Your next dose is:    
   
   
 Dose:  100 mg Take 100 mg by mouth two (2) times a day. Refills:  0  
     
   
   
   
  
 FLONASE 50 mcg/actuation nasal spray Generic drug:  fluticasone Your last dose was: Your next dose is:    
   
   
 Dose:  2 Spray 2 Sprays by Both Nostrils route as needed for Rhinitis. Refills:  0 HYDROcodone-acetaminophen 5-325 mg per tablet Commonly known as:  Boris Sarahh Your last dose was: Your next dose is:    
   
   
 Dose:  1 Tab Take 1 Tab by mouth two (2) times daily as needed for Pain. Max Daily Amount: 2 Tabs. Indications: Pain Quantity:  60 Tab Refills:  0  
     
   
   
   
  
 lisinopril 20 mg tablet Commonly known as:  Crescencio Leys Your last dose was: Your next dose is: TAKE 1 TABLET DAILY Quantity:  90 Tab Refills:  3  
     
   
   
   
  
 lovastatin 20 mg tablet Commonly known as:  MEVACOR Your last dose was: Your next dose is: TAKE 1 TABLET DAILY Quantity:  90 Tab Refills:  0 MIRALAX 17 gram/dose powder Generic drug:  polyethylene glycol Your last dose was: Your next dose is:    
   
   
 Dose:  17 g Take 17 g by mouth daily. As needed Refills:  0  
     
   
   
   
  
 * NORVASC 5 mg tablet Generic drug:  amLODIPine Your last dose was: Your next dose is:    
   
   
 Dose:  5 mg Take 5 mg by mouth nightly. Refills:  0  
     
   
   
   
  
 * NORVASC 2.5 mg tablet Generic drug:  amLODIPine Your last dose was: Your next dose is:    
   
   
 Dose:  2.5 mg Take 2.5 mg by mouth as needed (SBP > 160). Refills:  0  
     
   
   
   
  
 pantoprazole 40 mg tablet Commonly known as:  PROTONIX Your last dose was: Your next dose is: TAKE 1 TABLET DAILY IN PLACE OF OMEPRAZOLE Quantity:  90 Tab Refills:  1  
     
   
   
   
  
 * Notice: This list has 2 medication(s) that are the same as other medications prescribed for you. Read the directions carefully, and ask your doctor or other care provider to review them with you. Where to Get Your Medications These medications were sent to [address] [facility], [address] [facility] Suzanne Maegs AT [address] [facility]  [address] [place] [address], [address] [facility] Hours:  24-hours Phone:  [phone]  
  metroNIDAZOLE 500 mg tablet Discharge Instructions Patient Discharge Instructions Amna Smith / [id number] : 8/10/1926 Admitted 2017 Discharged: 2017 Take Home Medications · It is important that you take the medication exactly as they are prescribed. · Keep your medication in the bottles provided by the pharmacist and keep a list of the medication names, dosages, and times to be taken in your wallet. · Do not take other medications without consulting your doctor. What to do at [facility] Recommended diet: regular Recommended activity: walk with cane ; work with [address] [employer] If you experience any of the following symptoms: dizziness , abdominal pain , please follow up with Dr. Lynda Madera at [phone] Follow-up with Dr. Tree Keene in 5 to 7 days. Call [phone] for an appointment Information obtained by : 
I understand that if any problems occur once I am at home I am to contact my physician. I understand and acknowledge receipt of the instructions indicated above.   
 
                                                                                                                                     
Physician's or R.N.'s Signature Date/Time Patient or Representative Signature                                                          Date/Time Discharge Orders None Introducing [facility]! Ross Recinos introduces [employer] patient portal. Now you can access parts of your medical record, email your doctor's office, and request medication refills online. 1. In your internet browser, go to [email or web address] 2. Click on the First Time User? Click Here link in the Sign In box. You will see the New Member Sign Up page. 3. Enter your [employer] Access Code exactly as it appears below. You will not need to use this code after youve completed the sign-up process. If you do not sign up before the expiration date, you must request a new code. · [employer] Access Code: [email or web address] Expires: 7/5/2017  1:22 PM 
 
4. Enter the last four digits of your Social Security Number (xxxx) and Date of Birth (mm/dd/yyyy) as indicated and click Submit. You will be taken to the next sign-up page. 5. Create a [employer] ID. This will be your [employer] login ID and cannot be changed, so think of one that is secure and easy to remember. 6. Create a [employer] password. You can change your password at any time. 7. Enter your Password Reset Question and Answer. This can be used at a later time if you forget your password. 8. Enter your e-mail address. You will receive e-mail notification when new information is available in [address] 9. Click Sign Up. You can now view and download portions of your medical record. 10. Click the Download Summary menu link to download a portable copy of your medical information. If you have questions, please visit the Frequently Asked Questions section of the [employer] website.  Remember, [employer] is NOT to be used for urgent needs. For medical emergencies, dial 911. Now available from your iPhone and Android! General Information Please provide this summary of care documentation to your next provider. Patient Signature:  ____________________________________________________________ Date:  ____________________________________________________________  
  
Latisha Zion Provider Signature:  ____________________________________________________________ Date:  ____________________________________________________________

## 2017-04-26 PROCEDURE — 93306 TTE W/DOPPLER COMPLETE: CPT

## 2017-04-26 PROCEDURE — 77030032490 HC SLV COMPR SCD KNE COVD -B

## 2017-04-26 PROCEDURE — 74011250636 HC RX REV CODE- 250/636: Performed by: FAMILY MEDICINE

## 2017-04-26 PROCEDURE — 74011250637 HC RX REV CODE- 250/637: Performed by: FAMILY MEDICINE

## 2017-04-26 PROCEDURE — 95816 EEG AWAKE AND DROWSY: CPT | Performed by: INTERNAL MEDICINE

## 2017-04-26 PROCEDURE — 74011250637 HC RX REV CODE- 250/637: Performed by: INTERNAL MEDICINE

## 2017-04-26 PROCEDURE — 74011000250 HC RX REV CODE- 250: Performed by: FAMILY MEDICINE

## 2017-04-26 PROCEDURE — 65660000000 HC RM CCU STEPDOWN

## 2017-04-26 RX ORDER — DOXAZOSIN 1 MG/1
0.5 TABLET ORAL
Status: DISCONTINUED | OUTPATIENT
Start: 2017-04-26 | End: 2017-04-28 | Stop reason: HOSPADM

## 2017-04-26 RX ORDER — DOXAZOSIN 2 MG/1
1 TABLET ORAL
Status: DISCONTINUED | OUTPATIENT
Start: 2017-04-26 | End: 2017-04-26

## 2017-04-26 RX ADMIN — FLECAINIDE ACETATE 100 MG: 100 TABLET ORAL at 17:54

## 2017-04-26 RX ADMIN — SODIUM CHLORIDE 1000 ML: 900 INJECTION, SOLUTION INTRAVENOUS at 09:17

## 2017-04-26 RX ADMIN — METRONIDAZOLE 500 MG: 500 INJECTION, SOLUTION INTRAVENOUS at 11:46

## 2017-04-26 RX ADMIN — Medication 10 ML: at 15:42

## 2017-04-26 RX ADMIN — FLECAINIDE ACETATE 100 MG: 100 TABLET ORAL at 09:14

## 2017-04-26 RX ADMIN — METRONIDAZOLE 500 MG: 500 INJECTION, SOLUTION INTRAVENOUS at 03:08

## 2017-04-26 RX ADMIN — PANTOPRAZOLE SODIUM 40 MG: 40 TABLET, DELAYED RELEASE ORAL at 06:46

## 2017-04-26 RX ADMIN — DIAZEPAM 2 MG: 2 TABLET ORAL at 22:11

## 2017-04-26 RX ADMIN — Medication 10 ML: at 06:41

## 2017-04-26 RX ADMIN — AMLODIPINE BESYLATE 5 MG: 5 TABLET ORAL at 22:06

## 2017-04-26 RX ADMIN — METRONIDAZOLE 500 MG: 500 INJECTION, SOLUTION INTRAVENOUS at 17:57

## 2017-04-26 RX ADMIN — LOVASTATIN 20 MG: 20 TABLET ORAL at 22:06

## 2017-04-26 RX ADMIN — LISINOPRIL 20 MG: 10 TABLET ORAL at 09:12

## 2017-04-26 RX ADMIN — ASPIRIN 81 MG: 81 TABLET, COATED ORAL at 09:14

## 2017-04-26 RX ADMIN — DOXAZOSIN 0.5 MG: 1 TABLET ORAL at 22:15

## 2017-04-26 NOTE — INTERDISCIPLINARY ROUNDS
IDR/SLIDR Summary          Patient: Miguel Green MRN: [id number]    Age: 80 y.o. YOB: 1926 Room/Bed: [facility]   Admit Diagnosis: Syncope  Ischemic colitis ([facility]  Principal Diagnosis: <principal problem not specified>   Goals:cardiology consult,PT/OT; safety  Readmission: YES  Quality Measure: Not applicable  VTE Prophylaxis: Chemical  Influenza Vaccine screening completed? YES  Pneumococcal Vaccine screening completed? YES  Mobility needs: Yes   Nutrition plan:No  Consults:P.T, O.T. and Case Management    Financial concerns:No  Escalated to CM? NO  RRAT Score: 19   Interventions:  Testing due for pt today?  YES  LOS: 1 days Expected length of stay 3-4 days  Discharge plan: pending   PCP: Lonnie Grubbs MD  Transportation needs: No    Days before discharge:one day until discharge   Discharge disposition: Home pending    Signed:     Quincy Quispe  4/26/2017  7:21 AM

## 2017-04-26 NOTE — ED NOTES
TRANSFER - OUT REPORT:    Verbal report given to Blake RN(name) on [address]  being transferred to 6S(unit) for routine progression of care       Report consisted of patients Situation, Background, Assessment and   Recommendations(SBAR). Information from the following report(s) SBAR, ED Summary, Intake/Output, MAR, Recent Results and Cardiac Rhythm SR was reviewed with the receiving nurse. Lines:   Peripheral IV 04/25/17 Left Forearm (Active)   Site Assessment Clean, dry, & intact 4/25/2017  3:40 PM   Phlebitis Assessment 0 4/25/2017  3:40 PM   Infiltration Assessment 0 4/25/2017  3:40 PM   Hub Color/Line Status Green 4/25/2017  3:40 PM        Opportunity for questions and clarification was provided.       Patient transported with:   [employer]

## 2017-04-26 NOTE — PROGRESS NOTES
Problem: Pressure Injury - Risk of  Goal: *Prevention of pressure ulcer  Outcome: Progressing Towards Goal  Pt self repositions    Problem: Falls - Risk of  Goal: *Absence of falls  Outcome: Progressing Towards Goal  Fall safety education provided

## 2017-04-26 NOTE — ED NOTES
Pt attempted to give urine sample without success, will retry. Pt had BM, cleaned, repositioned in bed.

## 2017-04-26 NOTE — PROGRESS NOTES
Bedside and Verbal shift change report given to Arpit Atwood (oncoming nurse) by John Nunez (offgoing nurse). Report included the following information SBAR, Kardex, ED Summary, Procedure Summary, Intake/Output, Recent Results, Med Rec Status and Cardiac Rhythm NSR/SB.

## 2017-04-26 NOTE — PROGRESS NOTES
Medical Progress Note      NAME: Ana Manning   :  8/10/1926  MRM:  [id number]    Date/Time: 2017           Problem List:     Active Problems:    HTN (hypertension) (2011)      Ischemic colitis ([facility] (10/21/2013)      Overview: History of this approximately 10 years ago after an acute hypotensive       event      Syncope (2017)             Subjective:     Hx from pt, family(here) . Yesterday first syncopal episode was while on telephone. Second was while sitting on toilet and associated with extremity movements; 3rd one occurred after she stood up getting off toilet and then sat back on toilet. Each lasted seconds. Afterwards, was lethargic    This am denies dizziness,cp, sob. NSR on monitor. Denies abd pain . Had small bm this am.     Past Medical History:   Diagnosis Date    Acute ischemic colitis ([facility]     Arrhythmia     Paroxysmal  Afib,  SSS    Carotid stenosis, bilateral     moderate , right >left    GERD (gastroesophageal reflux disease) 12/10/2011    HTN (hypertension) 2011    HX OTHER MEDICAL     cardiac cath ;non obstructive CAD. Renal artery stents; open    HX OTHER MEDICAL     LLQ abd. pain thought due to spasm.  Hypercholesterolemia     Hypertension     Osteoporosis     Osteoporosis 2011    PAF (paroxysmal atrial fibrillation) ([facility] 2011    Rhinitis 2011    SSS (sick sinus syndrome) ([facility] 2011            Objective:         Vitals:      Last 24hrs VS reviewed since prior progress note.  Most recent are:    Visit Vitals    /47 (BP 1 Location: Right arm, BP Patient Position: At rest)    Pulse (!) 58    Temp 97.5 °F (36.4 °C)    Resp 17    Ht 5' 6\" (1.676 m)    Wt 139 lb 15.9 oz (63.5 kg)    SpO2 96%    Breastfeeding No    BMI 22.6 kg/m2     SpO2 Readings from Last 6 Encounters:   17 96%   17 98%   10/14/15 96%   05/01/15 97%   03/10/15 97%   14 95%    O2 Flow Rate (L/min): 2 l/min   No intake or output data in the 24 hours ending 04/26/17 0749               Exam:      General:  Alert, cooperative, no distress, appears stated age. Lungs:   Clear to auscultation bilaterally. Heart:  Regular rate and rhythm, S1, S2 normal, no murmur, click, rub or gallop. Abdomen:   Soft, slight LLQ T . Bowel sounds normal. No masses,  No organomegaly. Extremities: No pedal edema. Neuro: non focl      Lab Data Reviewed: (see below)  Recent Results (from the past 24 hour(s))   EKG, 12 LEAD, INITIAL    Collection Time: 04/25/17  3:37 PM   Result Value Ref Range    Ventricular Rate 68 BPM    Atrial Rate 68 BPM    P-R Interval 184 ms    QRS Duration 142 ms    Q-T Interval 476 ms    QTC Calculation (Bezet) 506 ms    Calculated P Axis 60 degrees    Calculated R Axis -58 degrees    Calculated T Axis 87 degrees    Diagnosis       Sinus rhythm with occasional premature ventricular complexes  Left axis deviation  Left ventricular hypertrophy with QRS widening and repolarization abnormality  When compared with ECG of 07-OCT-2015 13:31,  premature ventricular complexes are now present  ND interval has decreased  QRS duration has increased  Confirmed by Stephanie Pearson M.D., [facility] ([id number]) on 4/25/2017 5:02:13 PM     CBC WITH AUTOMATED DIFF    Collection Time: 04/25/17  3:44 PM   Result Value Ref Range    WBC 6.0 3.6 - 11.0 K/uL    RBC 4.16 3.80 - 5.20 M/uL    HGB 12.5 11.5 - 16.0 g/dL    HCT 38.0 35.0 - 47.0 %    MCV 91.3 80.0 - 99.0 FL    MCH 30.0 26.0 - 34.0 PG    MCHC 32.9 30.0 - 36.5 g/dL    RDW 13.3 11.5 - 14.5 %    PLATELET 306 436 - 809 K/uL    NEUTROPHILS 46 32 - 75 %    LYMPHOCYTES 40 12 - 49 %    MONOCYTES 12 5 - 13 %    EOSINOPHILS 1 0 - 7 %    BASOPHILS 1 0 - 1 %    ABS. NEUTROPHILS 2.8 1.8 - 8.0 K/UL    ABS. LYMPHOCYTES 2.4 0.8 - 3.5 K/UL    ABS. MONOCYTES 0.7 0.0 - 1.0 K/UL    ABS. EOSINOPHILS 0.1 0.0 - 0.4 K/UL    ABS.  BASOPHILS 0.0 0.0 - 0.1 K/UL   METABOLIC PANEL, COMPREHENSIVE    Collection Time: 04/25/17  3:44 PM Result Value Ref Range    Sodium 137 136 - 145 mmol/L    Potassium 3.5 3.5 - 5.1 mmol/L    Chloride 101 97 - 108 mmol/L    CO2 29 21 - 32 mmol/L    Anion gap 7 5 - 15 mmol/L    Glucose 126 (H) 65 - 100 mg/dL    BUN 21 (H) 6 - 20 MG/DL    Creatinine 1.01 0.55 - 1.02 MG/DL    BUN/Creatinine ratio 21 (H) 12 - 20      GFR est AA >60 >60 ml/min/1.73m2    GFR est non-AA 51 (L) >60 ml/min/1.73m2    Calcium 8.4 (L) 8.5 - 10.1 MG/DL    Bilirubin, total 0.3 0.2 - 1.0 MG/DL    ALT (SGPT) 26 12 - 78 U/L    AST (SGOT) 34 15 - 37 U/L    Alk.  phosphatase 76 45 - 117 U/L    Protein, total 6.4 6.4 - 8.2 g/dL    Albumin 3.1 (L) 3.5 - 5.0 g/dL    Globulin 3.3 2.0 - 4.0 g/dL    A-G Ratio 0.9 (L) 1.1 - 2.2     TROPONIN I    Collection Time: 04/25/17  3:44 PM   Result Value Ref Range    Troponin-I, Qt. <0.04 <0.05 ng/mL   LIPASE    Collection Time: 04/25/17  3:44 PM   Result Value Ref Range    Lipase 207 73 - 393 U/L   CK W/ REFLX CKMB    Collection Time: 04/25/17  3:44 PM   Result Value Ref Range    CK 74 26 - 192 U/L       Medications Reviewed: (see below)    ______________________________________________________________________    Medications:     Current Facility-Administered Medications   Medication Dose Route Frequency    amLODIPine (NORVASC) tablet 2.5 mg  2.5 mg Oral PRN    amLODIPine (NORVASC) tablet 5 mg  5 mg Oral QHS    aspirin delayed-release tablet 81 mg  81 mg Oral DAILY    diazePAM (VALIUM) tablet 2 mg  2 mg Oral DAILY PRN    doxazosin (CARDURA) tablet 1 mg  1 mg Oral DAILY    flecainide (TAMBOCOR) tablet 100 mg  100 mg Oral BID    HYDROcodone-acetaminophen (NORCO) 5-325 mg per tablet 1 Tab  1 Tab Oral BID PRN    lisinopril (PRINIVIL, ZESTRIL) tablet 20 mg  20 mg Oral DAILY    lovastatin (MEVACOR) tablet 20 mg  20 mg Oral QHS    pantoprazole (PROTONIX) tablet 40 mg  40 mg Oral ACB    0.9% sodium chloride infusion 1,000 mL  1,000 mL IntraVENous CONTINUOUS    sodium chloride (NS) flush 5-10 mL  5-10 mL IntraVENous Q8H    sodium chloride (NS) flush 5-10 mL  5-10 mL IntraVENous PRN    enoxaparin (LOVENOX) injection 40 mg  40 mg SubCUTAneous Q24H    metroNIDAZOLE (FLAGYL) IVPB premix 500 mg  500 mg IntraVENous Q8H                   Assessment:   Recurrent syncope . P: Echo , EEG , Cards c/s. ? Pacer needed. Suspect second episode was convulsive syncope    Hx SSS and PAF in past.     Minor LLQ T will cover w/ Flagyl for possible recurrent Ischemic colitis. Patient Active Problem List   Diagnosis Code    HTN (hypertension) I10    SSS (sick sinus syndrome) ([facility]) I49.5    PAF (paroxysmal atrial fibrillation) ([facility]) I48.0    Hypercholesterolemia E78.00    Osteoporosis M81.0    Rhinitis J31.0    Carotid stenosis, bilateral I65.23    GERD (gastroesophageal reflux disease) K21.9    Abdominal pain R10.9    Peripheral vascular disease ([facility]) I73.9    Ischemic colitis ([facility] K55.9    Gastritis K29.70    Diverticulitis of colon (without mention of hemorrhage) K57.32    Diarrhea R19.7    Colitis K52.9    Volume depletion, gastrointestinal loss E86.9    Abdominal pain, acute, generalized R10.84    Left sided colitis with rectal bleeding ([facility]) K51.511    Thrush, oral B37.0    Fever and chills R50.9    Acute respiratory failure with hypoxemia ([facility]) J96.01    Vasovagal near syncope R55    Non-sustained ventricular tachycardia ([facility]) I47.2    UTI (lower urinary tract infection) N39.0    Unsteady gait R26.81    VBI (vertebrobasilar insufficiency) G45.0    Labile essential hypertension I10    Functional gait abnormality R26.89    PVCs (premature ventricular contractions) I49.3    Syncope R55          Plan:     F/u labs.                                                         ___________________________________________________      Attending Physician: Jordan Antonio MD

## 2017-04-26 NOTE — CONSULTS
Cardiology Consult Note    Pt seen at the request of Dr. Jennifer Abraham for evaluation of recurrent syncope. She was admitted yesterday after experiencing three brief episodes of syncope. The first occurred while she was sitting talking on the phone. She recalls no warning. Her HR and BP were reported to be low. She then needed to go to the bathroom where she became syncopal on the toilet and then after trying to stand up. There were some tremors noted. She then had diarrhea on her way to hospital. She and her family report similar situations of syncope associated with bouts of colitis. She denies any syncope in the past not associated with bowel issues. Her granddaughter is wondering however if the syncope is causing the colitis. She has had paroxysmal afib with report of sick sinus syndrome and has not been put on beta blockers due to this. She denies any warning with her episodes. Prior to Admission Medications   Prescriptions Last Dose Informant Patient Reported? Taking? FOLIC ACID/MULTIVIT-MIN/LUTEIN (CENTRUM SILVER PO) 4/25/2017 at Unknown time  Yes Yes   Sig: Take 1 Tab by mouth daily. HYDROcodone-acetaminophen (NORCO) 5-325 mg per tablet   No Yes   Sig: Take 1 Tab by mouth two (2) times daily as needed for Pain. Max Daily Amount: 2 Tabs. Indications: Pain   amLODIPine (NORVASC) 2.5 mg tablet 3/25/2017 at Unknown time  Yes Yes   Sig: Take 2.5 mg by mouth as needed (SBP > 160). amLODIPine (NORVASC) 5 mg tablet 4/24/2017 at Unknown time  Yes Yes   Sig: Take 5 mg by mouth nightly. aspirin delayed-release 81 mg tablet 4/25/2017 at Unknown time  Yes Yes   Sig: Take 81 mg by mouth daily. calcium-cholecalciferol, d3, (CALCIUM 600 + D) 600-125 mg-unit tab 4/25/2017 at Unknown time  Yes Yes   Sig: Take 1 Tab by mouth daily.    diazepam (VALIUM) 2 mg tablet 4/18/2017 at Unknown time  No Yes   Sig: TAKE 1 TABLET BY MOUTH EVERY DAY AS NEEDED   doxazosin (CARDURA) 1 mg tablet 2017 at Unknown time  No Yes   Si/2 tablet po qhs   flecainide (TAMBOCOR) 100 mg tablet 2017 at Unknown time  Yes Yes   Sig: Take 100 mg by mouth two (2) times a day. fluticasone (FLONASE) 50 mcg/actuation nasal spray   Yes Yes   Si Sprays by Both Nostrils route as needed for Rhinitis. lisinopril (PRINIVIL, ZESTRIL) 20 mg tablet 2017 at Unknown time  No Yes   Sig: TAKE 1 TABLET DAILY   loratadine (CLARITIN) 10 mg tablet 2017 at Unknown time  Yes Yes   Sig: Take 10 mg by mouth daily as needed. lovastatin (MEVACOR) 20 mg tablet 2017 at pm  No Yes   Sig: TAKE 1 TABLET DAILY   pantoprazole (PROTONIX) 40 mg tablet 2017 at Unknown time  No Yes   Sig: TAKE 1 TABLET DAILY IN PLACE OF OMEPRAZOLE   polyethylene glycol (MIRALAX) 17 gram/dose powder 2017 at Unknown time  Yes Yes   Sig: Take 17 g by mouth daily. As needed      Facility-Administered Medications: None     Allergies   Allergen Reactions    Beta Blocker [Beta-Blockers (Beta-Adrenergic Blocking Agts)] Other (comments)     Sick sinus syndrome      Codeine Nausea Only    Donnatal [Phenobarb-Hyoscy-Atropine-Scop] Unknown (comments)    Exelon [Rivastigmine] Other (comments)     Nausea     Plavix [Clopidogrel] Rash    Sular [Nisoldipine] Vertigo     PMH:  Colitis-recurrent  SSS/Pafib  Carotid stenosis; left moderate; right mild  GERD  HTN  Hyperlipidemia  S/P stenting of bilateral renal arteries   PAD-s/p stenting of L SFA     SH: Lives alone; granddaughter there often    ROS:  Does not drink much fluid during the day  No weight change  Mild lower abd tenderness  No edema or claudication  No chest pain or shortness of breath  Occasional afib which is self terminated    PE:  Blood pressure 141/49, pulse (!) 59, temperature 98.2 °F (36.8 °C), resp.  rate 21, height 5' 6\" (1.676 m), weight 63.5 kg (139 lb 15.9 oz), SpO2 98 %, not currently breastfeeding. Slightly pale but in NAD  Lungs clear  No JVD  Bilateral bruits  Cor reg without M,R,G  Abd soft with mild tenderness and reduced bowel sounds  Ext with diminished pulses and no edema  Neuro alert and oriented. Non-focal.   Skin without bruising    EKG: NSR with IVCD  Labs demonstrate pre-renal state  Echo pending    Imp:  Recurrent syncope. Difficult to know if she is syncopal due to her gut issues alone or if she truly has a propensity for syncope, as in when dehydrated. I am not sure the SSS diagnosis isn't playing a roll as well. I would like to see what her HR does overall-ie with exertion. I think we will need an event monitor to sort this out completely and even mentioned a tilt table if necessary. Agree with echo. I have instructed her not to drive until we have a definitive diagnosis and treatment. I also suggested she work harder on hydration. HTN-difficult to keep tight control given her syncope and presumed ischemic colitis.   Colitis  Pafib  PAD  Carotid stenosis  Hyperlipidemia    Milady Smart MD

## 2017-04-26 NOTE — PROGRESS NOTES
Pt received by Bjorn Hoang RN at 1600. Bedside report. Echo and EEG completed today.  Pt ambulated to [facility] with one assist.

## 2017-04-26 NOTE — PROGRESS NOTES
Primary Nurse Kari Wilcox and Cassia Moreno, KENDRA performed a dual skin assessment on this patient No impairment noted  Rogerio score is 21

## 2017-04-27 ENCOUNTER — HOME HEALTH ADMISSION (OUTPATIENT)
Dept: HOME HEALTH SERVICES | Facility: HOME HEALTH | Age: 82
End: 2017-04-27
Payer: MEDICARE

## 2017-04-27 LAB
ANION GAP BLD CALC-SCNC: 7 MMOL/L (ref 5–15)
BUN SERPL-MCNC: 11 MG/DL (ref 6–20)
BUN/CREAT SERPL: 19 (ref 12–20)
CALCIUM SERPL-MCNC: 8 MG/DL (ref 8.5–10.1)
CHLORIDE SERPL-SCNC: 109 MMOL/L (ref 97–108)
CO2 SERPL-SCNC: 25 MMOL/L (ref 21–32)
CREAT SERPL-MCNC: 0.57 MG/DL (ref 0.55–1.02)
ERYTHROCYTE [DISTWIDTH] IN BLOOD BY AUTOMATED COUNT: 13.3 % (ref 11.5–14.5)
GLUCOSE SERPL-MCNC: 73 MG/DL (ref 65–100)
HCT VFR BLD AUTO: 33.4 % (ref 35–47)
HGB BLD-MCNC: 10.9 G/DL (ref 11.5–16)
MCH RBC QN AUTO: 30.1 PG (ref 26–34)
MCHC RBC AUTO-ENTMCNC: 32.6 G/DL (ref 30–36.5)
MCV RBC AUTO: 92.3 FL (ref 80–99)
PLATELET # BLD AUTO: 227 K/UL (ref 150–400)
POTASSIUM SERPL-SCNC: 3.5 MMOL/L (ref 3.5–5.1)
RBC # BLD AUTO: 3.62 M/UL (ref 3.8–5.2)
SODIUM SERPL-SCNC: 141 MMOL/L (ref 136–145)
WBC # BLD AUTO: 5.1 K/UL (ref 3.6–11)

## 2017-04-27 PROCEDURE — 85027 COMPLETE CBC AUTOMATED: CPT | Performed by: INTERNAL MEDICINE

## 2017-04-27 PROCEDURE — 74011000250 HC RX REV CODE- 250: Performed by: FAMILY MEDICINE

## 2017-04-27 PROCEDURE — 74011250636 HC RX REV CODE- 250/636: Performed by: FAMILY MEDICINE

## 2017-04-27 PROCEDURE — 97161 PT EVAL LOW COMPLEX 20 MIN: CPT

## 2017-04-27 PROCEDURE — G8979 MOBILITY GOAL STATUS: HCPCS

## 2017-04-27 PROCEDURE — 80048 BASIC METABOLIC PNL TOTAL CA: CPT | Performed by: INTERNAL MEDICINE

## 2017-04-27 PROCEDURE — 74011250637 HC RX REV CODE- 250/637: Performed by: INTERNAL MEDICINE

## 2017-04-27 PROCEDURE — 74011250636 HC RX REV CODE- 250/636: Performed by: INTERNAL MEDICINE

## 2017-04-27 PROCEDURE — 74011250637 HC RX REV CODE- 250/637: Performed by: FAMILY MEDICINE

## 2017-04-27 PROCEDURE — 97116 GAIT TRAINING THERAPY: CPT

## 2017-04-27 PROCEDURE — 36415 COLL VENOUS BLD VENIPUNCTURE: CPT | Performed by: INTERNAL MEDICINE

## 2017-04-27 PROCEDURE — 65660000000 HC RM CCU STEPDOWN

## 2017-04-27 PROCEDURE — G8978 MOBILITY CURRENT STATUS: HCPCS

## 2017-04-27 RX ORDER — SODIUM CHLORIDE 9 MG/ML
50 INJECTION, SOLUTION INTRAVENOUS CONTINUOUS
Status: DISCONTINUED | OUTPATIENT
Start: 2017-04-27 | End: 2017-04-28 | Stop reason: HOSPADM

## 2017-04-27 RX ORDER — METRONIDAZOLE 250 MG/1
500 TABLET ORAL 3 TIMES DAILY
Status: DISCONTINUED | OUTPATIENT
Start: 2017-04-27 | End: 2017-04-28 | Stop reason: HOSPADM

## 2017-04-27 RX ADMIN — METRONIDAZOLE 500 MG: 250 TABLET ORAL at 10:28

## 2017-04-27 RX ADMIN — DOXAZOSIN 0.5 MG: 1 TABLET ORAL at 22:42

## 2017-04-27 RX ADMIN — FLECAINIDE ACETATE 100 MG: 100 TABLET ORAL at 09:20

## 2017-04-27 RX ADMIN — Medication 6 ML: at 14:00

## 2017-04-27 RX ADMIN — HYDROCODONE BITARTRATE AND ACETAMINOPHEN 1 TABLET: 5; 325 TABLET ORAL at 20:22

## 2017-04-27 RX ADMIN — LISINOPRIL 20 MG: 10 TABLET ORAL at 09:20

## 2017-04-27 RX ADMIN — SODIUM CHLORIDE 50 ML/HR: 900 INJECTION, SOLUTION INTRAVENOUS at 22:47

## 2017-04-27 RX ADMIN — Medication 10 ML: at 07:37

## 2017-04-27 RX ADMIN — Medication 10 ML: at 03:28

## 2017-04-27 RX ADMIN — METRONIDAZOLE 500 MG: 250 TABLET ORAL at 22:32

## 2017-04-27 RX ADMIN — ASPIRIN 81 MG: 81 TABLET, COATED ORAL at 09:20

## 2017-04-27 RX ADMIN — LOVASTATIN 20 MG: 20 TABLET ORAL at 22:32

## 2017-04-27 RX ADMIN — AMLODIPINE BESYLATE 2.5 MG: 5 TABLET ORAL at 22:43

## 2017-04-27 RX ADMIN — SODIUM CHLORIDE 1000 ML: 900 INJECTION, SOLUTION INTRAVENOUS at 03:27

## 2017-04-27 RX ADMIN — Medication 10 ML: at 22:32

## 2017-04-27 RX ADMIN — AMLODIPINE BESYLATE 5 MG: 5 TABLET ORAL at 20:22

## 2017-04-27 RX ADMIN — HYDROCODONE BITARTRATE AND ACETAMINOPHEN 1 TABLET: 5; 325 TABLET ORAL at 10:33

## 2017-04-27 RX ADMIN — METRONIDAZOLE 500 MG: 500 INJECTION, SOLUTION INTRAVENOUS at 03:27

## 2017-04-27 RX ADMIN — HYDROCODONE BITARTRATE AND ACETAMINOPHEN 1 TABLET: 5; 325 TABLET ORAL at 03:28

## 2017-04-27 RX ADMIN — SODIUM CHLORIDE 50 ML/HR: 900 INJECTION, SOLUTION INTRAVENOUS at 09:00

## 2017-04-27 RX ADMIN — METRONIDAZOLE 500 MG: 250 TABLET ORAL at 17:14

## 2017-04-27 RX ADMIN — FLECAINIDE ACETATE 100 MG: 100 TABLET ORAL at 17:14

## 2017-04-27 RX ADMIN — PANTOPRAZOLE SODIUM 40 MG: 40 TABLET, DELAYED RELEASE ORAL at 07:37

## 2017-04-27 NOTE — PROGRESS NOTES
Patient is a 81 y/o   female insured by [facility] A/B ([employer] secondary), admitted to [facility] 4/25/17 for c/c syncope. Recently admitted to [facility] for GI bleed. Noted cardiology consult, recommendation today for home with event monitor and outpatient follow-up. Physical therapy has also evaluated, recommending home with [facility] or outpatient therapy. CM visited patient bedside. Patient A&Ox4. Son Mason Arroyo also present. Patient confirmed demographics on facesheet, lives alone in [address] [place] [address], independent with ADL's. Was driving PTA, however does not intend to after discharge 2/2 medical issues. Patient agreeable to [facility] if ordered by PCP/attending Dr. Enedina Callejas. Patient with questions in re: code status, states that she has completed a DDNR however this document not found in [facility] records. Patient asked if she can complete new DDNR so that there is one on file with [facility]. CM placed blank DDNR on hard chart, attempted to call PCP office x2 however was unable to reach anyone on provider line. Note left in [facility] to advise Dr. Enedina Callejas of patient's request.     Anticipate discharge home tomorrow (4/28/17) with family and [facility] if medically clear. CM to follow. JAIME Carmichael/RAFFI    Care Management Interventions  PCP Verified by CM:  Yes  Mode of Transport at Discharge:  (Family)  Transition of Care Consult (CM Consult): [address]: Yes  MyChart Signup: No  Discharge Durable Medical Equipment: No  Physical Therapy Consult: Yes  Occupational Therapy Consult: No  Speech Therapy Consult: No  Current Support Network: Lives Alone, Family Lives Nearby  Confirm Follow Up Transport: Family  Plan discussed with Pt/Family/Caregiver: Yes  Freedom of Choice Offered: Yes  Discharge Location  Discharge Placement: Home with home health

## 2017-04-27 NOTE — PROCEDURES
[address] [place], [place] [address] [address]   EEG       Name:  Tone Pacheco   MR#:  [id number]   :  08/10/1926   Account #:  [de-identified]    Date of Procedure:  2017   Date of Adm:  2017       REQUESTING PHYSICIAN: Nikia Sofia MD    HISTORY: The patient is a 51-year-old female who is being evaluated   for multiple episodes of syncope associated with extremity movements. DESCRIPTION: This is an 18-channel EEG performed on an awake   and drowsy patient. During wakefulness, the dominant posterior   background rhythm consists of symmetric, well-modulated, medium   voltage rhythms in the 9 Hz frequency range. Faster frequencies were   seen in the frontal head regions throughout the recording. Drowsiness   was characterized by slowing and vertex waves. Sleep spindles were   also seen for a brief period. Photic stimulation elicits a symmetric   driving response. Hyperventilation was not performed. EEG SUMMARY: Normal study. CLINICAL INTERPRETATION: This was a normal EEG during awake,   drowsy and asleep states of the patient. No lateralizing or epileptiform   features were noted.         MD LONNY Gibson / ENRIQUE   D:  2017   10:59   T:  2017   14:51   Job #:  [id number]

## 2017-04-27 NOTE — PROGRESS NOTES
Medical Progress Note      NAME: Miguel Chi   :  8/10/1926  MRM:  [id number]    Date/Time: 2017           Problem List:     Active Problems:    HTN (hypertension) (2011)      Ischemic colitis ([facility] (10/21/2013)      Overview: History of this approximately 10 years ago after an acute hypotensive       event      Syncope (2017)             Subjective:     Denies abd pain . Had small bm yesterday   Brief dizziness eatng lunch yesterday. Denies dizziness now. Echo: EF 55-60% . Mild to mod MR. Mild TR  No obivious WMA    Past Medical History:   Diagnosis Date    Acute ischemic colitis ([facility]     Arrhythmia     Paroxysmal  Afib,  SSS    Carotid stenosis, bilateral     moderate , right >left    GERD (gastroesophageal reflux disease) 12/10/2011    HTN (hypertension) 2011    HX OTHER MEDICAL     cardiac cath ;non obstructive CAD. Renal artery stents; open    HX OTHER MEDICAL     LLQ abd. pain thought due to spasm.  Hypercholesterolemia     Hypertension     Osteoporosis     Osteoporosis 2011    PAF (paroxysmal atrial fibrillation) ([facility] 2011    Rhinitis 2011    SSS (sick sinus syndrome) ([facility] 2011            Objective:         Vitals:      Last 24hrs VS reviewed since prior progress note.  Most recent are:    Visit Vitals    /47 (BP 1 Location: Right arm, BP Patient Position: At rest)    Pulse 73    Temp 97.8 °F (36.6 °C)    Resp 19    Ht 5' 6\" (1.676 m)    Wt 143 lb 11.8 oz (65.2 kg)    SpO2 97%    Breastfeeding No    BMI 23.2 kg/m2     SpO2 Readings from Last 6 Encounters:   17 97%   17 98%   10/14/15 96%   05/01/15 97%   03/10/15 97%   14 95%    O2 Flow Rate (L/min): 2 l/min     Intake/Output Summary (Last 24 hours) at 17 0805  Last data filed at 17 1600   Gross per 24 hour   Intake                0 ml   Output              400 ml   Net             -400 ml                  Exam:      General:  Alert, cooperative, no distress, appears stated age. Lungs:   Clear to auscultation bilaterally. Heart:  Regular rate and rhythm, S1, S2 normal, no murmur, click, rub or gallop. Abdomen:   Soft, non-tender. Bowel sounds normal. No masses,  No organomegaly. Extremities: No ankle edema. Low back nt . Hips from . Had brief post pelvis pain las night responding to Norco    EEG pending     Monitor : NSR. Occasional PVC     Lab Data Reviewed: (see below)  No results found for this or any previous visit (from the past 24 hour(s)). Medications Reviewed: (see below)    ______________________________________________________________________    Medications:     Current Facility-Administered Medications   Medication Dose Route Frequency    metroNIDAZOLE (FLAGYL) tablet 500 mg  500 mg Oral TID    0.9% sodium chloride infusion  50 mL/hr IntraVENous CONTINUOUS    doxazosin (CARDURA) tablet 0.5 mg  0.5 mg Oral QHS    amLODIPine (NORVASC) tablet 2.5 mg  2.5 mg Oral PRN    amLODIPine (NORVASC) tablet 5 mg  5 mg Oral QHS    aspirin delayed-release tablet 81 mg  81 mg Oral DAILY    diazePAM (VALIUM) tablet 2 mg  2 mg Oral DAILY PRN    flecainide (TAMBOCOR) tablet 100 mg  100 mg Oral BID    HYDROcodone-acetaminophen (NORCO) 5-325 mg per tablet 1 Tab  1 Tab Oral BID PRN    lisinopril (PRINIVIL, ZESTRIL) tablet 20 mg  20 mg Oral DAILY    lovastatin (MEVACOR) tablet 20 mg  20 mg Oral QHS    pantoprazole (PROTONIX) tablet 40 mg  40 mg Oral ACB    sodium chloride (NS) flush 5-10 mL  5-10 mL IntraVENous Q8H    sodium chloride (NS) flush 5-10 mL  5-10 mL IntraVENous PRN                   Assessment:   Syncope. Cardiology to advise further ? PPM .   Discussed w/ family , here. Prob Ischemic Colitis. Better. Change to PO Flagyl. Advance diet.    Patient Active Problem List   Diagnosis Code    HTN (hypertension) I10    SSS (sick sinus syndrome) ([facility]) I49.5    PAF (paroxysmal atrial fibrillation) ([facility]) I48.0    Hypercholesterolemia E78.00    Osteoporosis M81.0    Rhinitis J31.0    Carotid stenosis, bilateral I65.23    GERD (gastroesophageal reflux disease) K21.9    Abdominal pain R10.9    Peripheral vascular disease ([facility]) I73.9    Ischemic colitis ([facility]) K55.9    Gastritis K29.70    Diverticulitis of colon (without mention of hemorrhage) K57.32    Diarrhea R19.7    Colitis K52.9    Volume depletion, gastrointestinal loss E86.9    Abdominal pain, acute, generalized R10.84    Left sided colitis with rectal bleeding ([facility]) K51.511    Thrush, oral B37.0    Fever and chills R50.9    Acute respiratory failure with hypoxemia ([facility]) J96.01    Vasovagal near syncope R55    Non-sustained ventricular tachycardia ([facility]) I47.2    UTI (lower urinary tract infection) N39.0    Unsteady gait R26.81    VBI (vertebrobasilar insufficiency) G45.0    Labile essential hypertension I10    Functional gait abnormality R26.89    PVCs (premature ventricular contractions) I49.3    Syncope R55          Plan:     HTN controlled.                                                         ___________________________________________________      Attending Physician: Nila Ortega MD

## 2017-04-27 NOTE — PROGRESS NOTES
Problem: Mobility Impaired (Adult and Pediatric)  Goal: *Acute Goals and Plan of Care (Insert Text)  Physical Therapy Goals  Initiated 4/27/2017  1. Patient will move from supine to sit and sit to supine , scoot up and down and roll side to side in bed with independence within 7 day(s). 2. Patient will transfer from bed to chair and chair to bed with independence using the least restrictive device within 7 day(s). 3. Patient will perform sit to stand with independence within 7 day(s). 4. Patient will ambulate with independence for 250 feet with the least restrictive device within 7 day(s). 5. Patient will ascend/descend 2 stairs with bilateral handrail(s) with independence within 7 day(s). PHYSICAL THERAPY EVALUATION  Patient: Eleanor Zuñiga (80 y.o. female)  Date: 4/27/2017  Primary Diagnosis: Syncope  Ischemic colitis [facility])        Precautions: fall         ASSESSMENT :  Chart reviewed. Pt cleared to be seen by nursing staff. Based on the objective data described below, the patient presents with impaired strength and balance resulting in decreased independence and functional mobility. Pt received in supine, agreeable to session. Pt transferred to sitting independently. Pt transferred sit<>stand with CGA, pt preferring to have HHA for balance. Pt ambulated 500' with CGA, narrow LOVE and path deviations noted. Pt had one LOB to the R, requiring physical assistance to correct. Of note, HR response to activity was muted, HR 68bpm with activity. Pt ended session supine in bed, call bell within reach. Pt scored 20/28 on the Tinetti, indicating moderate risk of falling. Pt will likely benefit from an assistive device to improve stability. Pt lives alone and is currently at an increased risk of falling. Discharge recommendations TBD pending continued progress with acute PT. Patient will benefit from skilled intervention to address the above impairments.   Patients rehabilitation potential is considered to be Good  Factors which may influence rehabilitation potential include:   [ ]         None noted  [ ]         Mental ability/status  [X]         Medical condition  [ ]         Home/family situation and support systems  [ ]         Safety awareness  [ ]         Pain tolerance/management  [ ]         Other:        PLAN :  Recommendations and Planned Interventions:  [X]           Bed Mobility Training             [ ]    Neuromuscular Re-Education  [X]           Transfer Training                   [ ]    Orthotic/Prosthetic Training  [X]           Gait Training                         [ ]    Modalities  [X]           Therapeutic Exercises           [ ]    Edema Management/Control  [X]           Therapeutic Activities            [ ]    Patient and Family Training/Education  [X]           Other (comment):     Frequency/Duration: Patient will be followed by physical therapy  5 times a week to address goals. Discharge Recommendations: To Be Determined, HHPT vs outpatient  Further Equipment Recommendations for Discharge: TBD, may be more safe at home with cane vs RW. Will trial tomorrow as appropriate. SUBJECTIVE:   Patient stated I guess I feel ok.       OBJECTIVE DATA SUMMARY:   HISTORY:    Past Medical History:   Diagnosis Date    Acute ischemic colitis ([facility] [address]      Arrhythmia       Paroxysmal  Afib,  SSS    Carotid stenosis, bilateral       moderate , right >left    GERD (gastroesophageal reflux disease) 12/10/2011    HTN (hypertension) 12/8/2011    HX OTHER MEDICAL 2011     cardiac cath ;non obstructive CAD. Renal artery stents; open    HX OTHER MEDICAL 2011     LLQ abd. pain thought due to spasm.     Hypercholesterolemia      Hypertension      Osteoporosis      Osteoporosis 12/8/2011    PAF (paroxysmal atrial fibrillation) ([facility] [address] 12/8/2011    Rhinitis 12/8/2011    SSS (sick sinus syndrome) ([facility] [address] 12/8/2011     Past Surgical History:   Procedure Laterality Date    ENDOSCOPY, COLON, DIAGNOSTIC   3/2/11 diverticulosis,hemorrhoids    HX CHOLECYSTECTOMY        HX HYSTERECTOMY         1960    HX OTHER SURGICAL         stent left leg    HX TONSILLECTOMY         Prior Level of Function/Home Situation: Independent, ambulates without assistive device, however MD had recommended she get a cane  Personal factors and/or comorbidities impacting plan of care:      Home Situation  Home Environment: Private residence  # Steps to Enter: 2  Rails to Enter: Yes  Hand Rails : Bilateral  One/Two Story Residence: One story  Living Alone: Yes  Support Systems: Child(myriam)  Patient Expects to be Discharged to[de-identified] Private residence  Current DME Used/Available at Home: None     EXAMINATION/PRESENTATION/DECISION MAKING:   Hearing: Auditory  Auditory Impairment: None  Range Of Motion:  AROM: Generally decreased, functional  PROM: Generally decreased, functional  Strength:    Strength: Generally decreased, functional  Tone & Sensation:   Tone: Normal  Sensation: Intact  Coordination:  Coordination: Within functional limits  Functional Mobility:  Bed Mobility:  Supine to Sit: Independent  Sit to Supine: Independent  Transfers:  Sit to Stand: Contact guard assistance  Stand to Sit: Contact guard assistance  Balance:   Sitting: Intact  Standing: Impaired  Standing - Static: Good  Standing - Dynamic : Fair  Ambulation/Gait Training:  Distance (ft): 500 Feet (ft)  Assistive Device: Gait belt  Ambulation - Level of Assistance: Contact guard assistance;Minimal assistance  Gait Description (WDL): Exceptions to WDL  Gait Abnormalities: Path deviations;Decreased step clearance; Altered arm swing  Base of Support: Narrowed  Speed/Michelle: Slow  Step Length: Right shortened;Left shortened     Functional Measure:  Tinetti test:      Sitting Balance: 1  Arises: 1  Attempts to Rise: 2  Immediate Standing Balance: 2  Standing Balance: 1  Nudged: 1  Eyes Closed: 1  Turn 360 Degrees - Continuous/Discontinuous: 0  Turn 360 Degrees - Steady/Unsteady: 1  Sitting Down: 1  Balance Score: 11  Indication of Gait: 1  R Step Length/Height: 1  L Step Length/Height: 1  R Foot Clearance: 1  L Foot Clearance: 1  Step Symmetry: 1  Step Continuity: 1  Path: 1  Trunk: 1  Walking Time: 0  Gait Score: 9  Total Score: 20         Tinetti Test and G-code impairment scale:  Percentage of Impairment CH     0%    CI     1-19% CJ     20-39% CK     40-59% CL     60-79% CM     80-99% CN      100%   Tinetti  Score 0-28 28 23-27 17-22 12-16 6-11 1-5 0          Tinetti Tool Score Risk of Falls  <19 = High Fall Risk  19-24 = Moderate Fall Risk  25-28 = Low Fall Risk  Tinetti ME. Performance-Oriented Assessment of Mobility Problems in Elderly Patients. N[place]ak 66; [id number]. (Scoring Description: PT Bulletin Feb. 10, 1993)     Older adults: Ross Boyd et al, 2009; n = 1000 [facility] elderly evaluated with ABC, COMFORT, ADL, and IADL)  · Mean COMFORT score for males aged 69-68 years = 26.21(3.40)  · Mean COMFORT score for females age 69-68 years = 25.16(4.30)  · Mean COMFORT score for males over 80 years = 23.29(6.02)  · Mean COMFORT score for females over 80 years = 17.20(8.32)         G codes: In compliance with CMSs Claims Based Outcome Reporting, the following G-code set was chosen for this patient based on their primary functional limitation being treated: The outcome measure chosen to determine the severity of the functional limitation was the Tinetti with a score of 20/28 which was correlated with the impairment scale.       · Mobility - Walking and Moving Around:               - CURRENT STATUS:    CJ - 20%-39% impaired, limited or restricted               - GOAL STATUS:           CI - 1%-19% impaired, limited or restricted               - D/C STATUS:                       ---------------To be determined---------------      Physical Therapy Evaluation Charge Determination   History Examination Presentation Decision-Making   MEDIUM  Complexity : 1-2 comorbidities / personal factors will impact the outcome/ POC  LOW Complexity : 1-2 Standardized tests and measures addressing body structure, function, activity limitation and / or participation in recreation  LOW Complexity : Stable, uncomplicated  LOW Complexity : FOTO score of       Based on the above components, the patient evaluation is determined to be of the following complexity level: LOW      Pain:  Pain Scale 1: Numeric (0 - 10)  Pain Intensity 1: 0      Activity Tolerance:   Pt with chronotropic incompetence, HR 59-65bpm at rest, 68-72bpm during ambulation (for 6 minutes)     Please refer to the flowsheet for vital signs taken during this treatment. After treatment:   [ ]         Patient left in no apparent distress sitting up in chair  [X]         Patient left in no apparent distress in bed  [X]         Call bell left within reach  [ ]         Nursing notified  [ ]         Caregiver present  [ ]         Bed alarm activated      COMMUNICATION/EDUCATION:   The patients plan of care was discussed with: Registered Nurse.  [X]         Fall prevention education was provided and the patient/caregiver indicated understanding. [X]         Patient/family have participated as able in goal setting and plan of care. [ ]         Patient/family agree to work toward stated goals and plan of care. [ ]         Patient understands intent and goals of therapy, but is neutral about his/her participation. [ ]         Patient is unable to participate in goal setting and plan of care. Thank you for this referral.  Isabela Gaspar, SPT   Time Calculation: 24 mins  Regarding student involvement in patient care:  A student participated in this treatment session. Per CMS Medicare statements and APTA guidelines I certify that the following was true:  1. I was present and directly observed the entire session. 2. I made all skilled judgments and clinical decisions regarding care.   3. I am the practitioner responsible for assessment, treatment, and documentation.

## 2017-04-27 NOTE — PROGRESS NOTES
Cardiology Progress Note    Pt reports she has had no dizziness. She has only been up in the room. She tolerated a reg diet this am      Blood pressure 143/47, pulse 73, temperature 97.8 °F (36.6 °C), resp. rate 19, height 5' 6\" (1.676 m), weight 65.2 kg (143 lb 11.8 oz), SpO2 97 %, not currently breastfeeding. Lungs clear  Cor reg    Labs reviewed:mildly anemic after hydration  Echo: normal function; MR    Imp:  Syncope: Differential is neurally mediated related to stimulus from her gut; orthostasis related to dehydration in setting of diarrhea; or primary arrhythmia. The latter seems the least likely. She may have chronotropic incompetence, but she is not profoundly bradycardic at rest and this should not cause josé syncope unless she is having sinus pauses. I will see how her HR responds to the walk in the lorenzana today. I think she should go home with an event monitor-I will order this to be delivered to her home. We may end up needing to do a tilt table test in her, but the monitor may answer the question first.   HTN: BP stable in here  Colitis: improved    I'm away tomorrow. Group to cover.      Nany Diaz MD

## 2017-04-27 NOTE — PROGRESS NOTES
Bedside shift change report given to Regine Fabian RN (oncoming nurse) by Yesi Wang RN (offgoing nurse). Report included the following information SBAR, Kardex, Intake/Output, MAR, Accordion, Recent Results and Cardiac Rhythm NSR/SB.

## 2017-04-28 VITALS
RESPIRATION RATE: 12 BRPM | HEART RATE: 63 BPM | SYSTOLIC BLOOD PRESSURE: 139 MMHG | WEIGHT: 146.61 LBS | OXYGEN SATURATION: 99 % | HEIGHT: 66 IN | BODY MASS INDEX: 23.56 KG/M2 | TEMPERATURE: 97.6 F | DIASTOLIC BLOOD PRESSURE: 58 MMHG

## 2017-04-28 PROCEDURE — 74011250637 HC RX REV CODE- 250/637: Performed by: FAMILY MEDICINE

## 2017-04-28 PROCEDURE — 74011250637 HC RX REV CODE- 250/637: Performed by: INTERNAL MEDICINE

## 2017-04-28 RX ORDER — METRONIDAZOLE 500 MG/1
500 TABLET ORAL 3 TIMES DAILY
Qty: 12 TAB | Refills: 0 | Status: SHIPPED | OUTPATIENT
Start: 2017-04-28 | End: 2017-05-04 | Stop reason: ALTCHOICE

## 2017-04-28 RX ADMIN — LISINOPRIL 20 MG: 10 TABLET ORAL at 08:58

## 2017-04-28 RX ADMIN — HYDROCODONE BITARTRATE AND ACETAMINOPHEN 1 TABLET: 5; 325 TABLET ORAL at 08:57

## 2017-04-28 RX ADMIN — AMLODIPINE BESYLATE 2.5 MG: 5 TABLET ORAL at 07:23

## 2017-04-28 RX ADMIN — METRONIDAZOLE 500 MG: 250 TABLET ORAL at 08:57

## 2017-04-28 RX ADMIN — ASPIRIN 81 MG: 81 TABLET, COATED ORAL at 08:59

## 2017-04-28 RX ADMIN — FLECAINIDE ACETATE 100 MG: 100 TABLET ORAL at 08:58

## 2017-04-28 RX ADMIN — PANTOPRAZOLE SODIUM 40 MG: 40 TABLET, DELAYED RELEASE ORAL at 06:50

## 2017-04-28 RX ADMIN — Medication 10 ML: at 06:50

## 2017-04-28 NOTE — PROGRESS NOTES
Transition RN to the Bedside to assist Primary RN with patient education, medication educations, discharge instructions, and stroke core measure compliance. Discharge concerns initiated and discussed with patient, including clarification on \"who\" assists the patient at their home and instructions for when the home going patient should call their provider after discharge. Opportunity for questions and clarification was provided. Patient receptive to education: YES  Patient stated: \"I've had the nicest nurses here. \"  Barriers to Education: None  Diagnosis Education given:  YES    Length of stay: 3  Expected Day of Discharge: 3  Ask if they have \"Help at Home\" & add to white board? YES    Education Day #: 1    Medication Education Given:  YES  M in the box Medication name: Flagyl      Stroke Education documented in Patient Education: NO  Core Measures Documented in [facility]:  Risk Factors: NO  Warning signs of stroke: NO  When to Activate 911: NO  Medication Education for Risk Factors: NO  Smoking cessation if applicable: NO  Written Education Given:  NO    Discharge NIH Completed: NO  Score: N/A    BRAINS: NO    Follow Up Appointment Made: YES  Date/Time if applicable: 1 week    I have reviewed discharge instructions with the patient and vasiliy. The patient and vasiliy verbalized understanding. Patient being discharged to home. Belongings with patient. Rx called into [facility].

## 2017-04-28 NOTE — PROGRESS NOTES
Cardiology Progress Note    TY o/n. No complaints this AM. She had an episode of ? SVT overnight which only lasted 10 or so beats. Asymptomatic. Blood pressure 139/58, pulse 63, temperature 97.6 °F (36.4 °C), resp. rate 12, height 5' 6\" (1.676 m), weight 66.5 kg (146 lb 9.7 oz), SpO2 99 %, not currently breastfeeding. NAD  No JVD  Lungs clear  Cor reg  No edema    Labs reviewed:mildly anemic after hydration  Echo: normal function; MR    Imp:  Syncope: Less likely arrhythmic in nature. Short burst of arrhythmia overnight not likely to cause any real symptoms and not syncope.     Will set her up for an event monitor through the office which will be sent to her directly at home    Pauly Crowe MD

## 2017-04-28 NOTE — PROGRESS NOTES
HHPT orders noted on chart, expect discharge home with family later this afternoon. [facility] [place] [facility] has accepted patient. [facility] info added to AVS. No further CM needs at this time.  JAIME Carmichael/CRM

## 2017-04-28 NOTE — PROGRESS NOTES
Bedside shift change report given to Macario Domingo (oncoming nurse) by Juanita Reese RN (offgoing nurse). Report included the following information SBAR, Kardex, ED Summary, OR Summary, Procedure Summary, Intake/Output, MAR, Accordion, Recent Results, Med Rec Status and Cardiac Rhythm NSR/ Sinus Tammy Woodward.

## 2017-04-28 NOTE — DISCHARGE SUMMARY
[address] [address], [address] [facility] SUMMARY       Name:  Elidia Hardy   MR#:  [id number]   :  08/10/1926   Account #:  [de-identified]        Date of Adm:  2017       DISCHARGE DIAGNOSES   1. Syncope. 2. Probable ischemic colitis. 3. Hypertension. DISCHARGE INSTRUCTIONS    1. Metronidazole 500 mg p.o. t.i.d. x4 days. 2. Resume usual medications. 3. Regular diet walk. 4. Walk with a cane. 5. Work with home health PT.   6. Call for dizziness, abdominal pain or other changes in health. 7. Otherwise, follow up with Dr. Milta Sicard in 5-7 days. 8. Have an event monitor set up by [facility]. CHIEF COMPLAINT: A 79-year-old white female admitted with 3   episodes of syncope. HISTORY OF PRESENT ILLNESS: The patient was recently   hospitalized here for an ischemic colitis and was doing well on   followup. On the day of admission, she had 3 brief episodes of   syncope, the first occurring while she was sitting and talking on the   phone, she had no warning. Her heart rate and blood pressure were   reported to be low. She then needed to to to the bathroom, where she   became syncopal on the toilet after trying to stand up. There were   some associated tremors. then, she had one more  Brief syncopal episode. She has had some diarrhea on the way to the hospital. She and her family report similar episodes of   syncope, associated with bouts of colitis in the past. She has a known   history of ischemic colitis. She denies any syncope in the past not   associated with her bowel issues. She has had paroxysmal AFib in the   past, with reports of sick sinus syndrome. She denies any warning with   her episodes. She came to the ER and was admitted to full inpatient   admission by Dr. Tammy Santos on-call for me.     PAST MEDICAL HISTORY: Episodes of syncope associated acute   ischemic colitis in the past, acute ischemic colitis, paroxysmal AFib,   sick sinus syndrome, moderate right greater than left carotid stenosis,   hypertension, GERD, nonobstructive coronary artery disease in 2011   on cardiac catheterization, renal artery stents were open, left lower   quadrant abdominal pain thought due to spasm, hypercholesterolemia,   hypertension as mentioned, osteoporosis and rhinitis. PAST SURGICAL HISTORY: Colonoscopy in 2011, diverticulosis,   cholecystectomy, hysterectomy, left leg stent and tonsillectomy. MEDICATIONS   1. ASA 81 mg daily. 2. MiraLax 17 g daily as needed. 3  Centrum Silver 1 p.o. daily. 4. Calcium 600 mg daily. 5. Cholecalciferol D3 125 mg 1 tablet daily. 6. Amlodipine 2.5 mg daily p.r.n. systolic blood pressure at home   greater than 160.   7. Tambocor (Flecainide) 100 mg p.o. b.i.d.   8. Doxazosin 0.5 mg at bedtime. 9. Lovastatin 20 mg at bedtime. 10. Norco 5/325 one p.o. b.i.d. p.r.n. pain. 11. Protonix 40 mg daily. 12. Diazepam 2 mg p.o. daily p.r.n. 13. Zestril 20 mg daily. 14. Flonase 2 sprays both nostrils as needed for rhinitis. 15. Norvasc scheduled 5 mg daily. 16. Claritin 10 mg daily p.r.n. ALLERGIES   1. BETA BLOCKERS (HISTORY OF SICK SINUS SYNDROME). 2. CODEINE. 3. DONNATAL. 4. EXELON (NAUSEA). 5. PLAVIX (RASH). 6. SULAR (VERTIGO). FAMILY HISTORY: Noncontributory. SOCIAL HISTORY: Negative smoking, negative ETOH. CODE STATUS: SHE REQUESTS DNR CODE STATUS. REVIEW OF SYSTEMS: Comprehensive review of systems is   negative except for that written in history of present illness. PHYSICAL EXAMINATION   VITAL SIGNS: /57, pulse 71, temperature 97.7, respiratory rate   22, weight 127 pounds 6 ounces. NECK: Supple. No carotid bruits heard. No JVD elevation. LUNGS: Clear. HEART: Regular, without murmur, gallop or rub. ABDOMEN: Mild lower abdominal tenderness. Positive bowel sounds. EXTREMITIES: No edema.    NEUROLOGIC: Grossly nonfocal.    DATA: EKG, normal sinus rhythm, some widened QRS anteriorly,   unchanged from prior EKGs, left axis deviation, left ventricular   hypertrophy with QRS widening and repolarization abnormality. LABORATORY: WBC 6.0, hemoglobin 12.5, platelet count 164, normal   differential. Potassium 3.5, glucose 126, BUN 21, creatinine 1.01,   calcium 8.4, albumin 3.1. LFTs normal. Troponin I less than 0.04. HOSPITAL COURSE: The patient was admitted to neuro-telemetry   and seen in consultation by Gabby Colindres MD, Cardiologist.   Echocardiogram, EF estimated at 55% to 60%, no obvious wall motion   abnormalities identified in the views obtained. Wall thickness was   increased, mild to moderate MR, mild tricuspid regurgitation. EEG was   normal. She was felt to have a probable mild episode of ischemic colitis, with loose bowels, and minor left lower   quadrant tenderness. She was placed on IV Flagyl, which was changed to   p.o. She was mobilized with physical therapy. Differential of her   syncope included neurally-mediated syncope related to a stimulus from   her gut, orthostasis related to dehydration in the setting of diarrhea. Primary arrhythmia seemed less likely. She was not profoundly   bradycardic. She was mobilized with physical therapy, and discharged   home in improved condition, to have followup as above.         MD STEPHANIE [facility] / Nilo Dumont   D:  04/28/2017   13:17   T:  04/28/2017   13:52   Job #:  [id number]

## 2017-04-28 NOTE — DISCHARGE INSTRUCTIONS
Patient Discharge Instructions    Agustina Oneil / [id number] : 8/10/1926    Admitted 2017 Discharged: 2017     Take Home Medications            · It is important that you take the medication exactly as they are prescribed. · Keep your medication in the bottles provided by the pharmacist and keep a list of the medication names, dosages, and times to be taken in your wallet. · Do not take other medications without consulting your doctor. What to do at Home    Recommended diet: regular     Recommended activity: walk with cane ; work with [facility]     If you experience any of the following symptoms: dizziness , abdominal pain , please follow up with Dr. Deb Steele at [phone]     Follow-up with Dr. Lexa Novak in 5 to 7 days. Call [phone] for an appointment         Information obtained by :  I understand that if any problems occur once I am at home I am to contact my physician. I understand and acknowledge receipt of the instructions indicated above.                                                                                                                                            Physician's or R.N.'s Signature                                                                  Date/Time                                                                                                                                              Patient or Representative Signature                                                          Date/Time

## 2017-04-28 NOTE — PROGRESS NOTES
PCP  S: denies dizziness or abd pain   O: Bp varies. Afebrile  Lung clear   Cor reg   abd benign exam   No ankle edema     A/P   1. No further syncope   2. Probable resolving ischemic colitis.    3. Hopefully home later today     Left voice mail for g dtr

## 2017-04-30 ENCOUNTER — HOME CARE VISIT (OUTPATIENT)
Dept: HOME HEALTH SERVICES | Facility: HOME HEALTH | Age: 82
End: 2017-04-30

## 2017-05-04 ENCOUNTER — HOME CARE VISIT (OUTPATIENT)
Dept: SCHEDULING | Facility: HOME HEALTH | Age: 82
End: 2017-05-04
Payer: MEDICARE

## 2017-05-04 PROCEDURE — 3331090001 HH PPS REVENUE CREDIT

## 2017-05-04 PROCEDURE — 3331090002 HH PPS REVENUE DEBIT

## 2017-05-04 PROCEDURE — 400013 HH SOC

## 2017-05-04 PROCEDURE — G0151 HHCP-SERV OF PT,EA 15 MIN: HCPCS

## 2017-05-05 PROCEDURE — 3331090001 HH PPS REVENUE CREDIT

## 2017-05-05 PROCEDURE — 3331090002 HH PPS REVENUE DEBIT

## 2017-05-06 PROCEDURE — 3331090002 HH PPS REVENUE DEBIT

## 2017-05-06 PROCEDURE — 3331090001 HH PPS REVENUE CREDIT

## 2017-05-07 PROCEDURE — 3331090002 HH PPS REVENUE DEBIT

## 2017-05-07 PROCEDURE — 3331090001 HH PPS REVENUE CREDIT

## 2017-05-08 VITALS
OXYGEN SATURATION: 99 % | SYSTOLIC BLOOD PRESSURE: 158 MMHG | DIASTOLIC BLOOD PRESSURE: 60 MMHG | HEART RATE: 69 BPM | TEMPERATURE: 98.2 F

## 2017-05-08 PROCEDURE — 3331090002 HH PPS REVENUE DEBIT

## 2017-05-08 PROCEDURE — 3331090001 HH PPS REVENUE CREDIT

## 2017-05-09 ENCOUNTER — HOME CARE VISIT (OUTPATIENT)
Dept: SCHEDULING | Facility: HOME HEALTH | Age: 82
End: 2017-05-09
Payer: MEDICARE

## 2017-05-09 PROCEDURE — 3331090001 HH PPS REVENUE CREDIT

## 2017-05-09 PROCEDURE — 3331090002 HH PPS REVENUE DEBIT

## 2017-05-10 PROCEDURE — 3331090002 HH PPS REVENUE DEBIT

## 2017-05-10 PROCEDURE — 3331090001 HH PPS REVENUE CREDIT

## 2017-05-11 ENCOUNTER — HOME CARE VISIT (OUTPATIENT)
Dept: SCHEDULING | Facility: HOME HEALTH | Age: 82
End: 2017-05-11
Payer: MEDICARE

## 2017-05-11 VITALS
OXYGEN SATURATION: 98 % | DIASTOLIC BLOOD PRESSURE: 56 MMHG | SYSTOLIC BLOOD PRESSURE: 130 MMHG | TEMPERATURE: 98.6 F | HEART RATE: 61 BPM

## 2017-05-11 PROCEDURE — 3331090001 HH PPS REVENUE CREDIT

## 2017-05-11 PROCEDURE — 3331090002 HH PPS REVENUE DEBIT

## 2017-05-11 PROCEDURE — G0151 HHCP-SERV OF PT,EA 15 MIN: HCPCS

## 2017-05-12 VITALS
SYSTOLIC BLOOD PRESSURE: 136 MMHG | HEART RATE: 86 BPM | TEMPERATURE: 98.6 F | OXYGEN SATURATION: 98 % | DIASTOLIC BLOOD PRESSURE: 58 MMHG

## 2017-05-12 PROCEDURE — 3331090001 HH PPS REVENUE CREDIT

## 2017-05-12 PROCEDURE — 3331090002 HH PPS REVENUE DEBIT

## 2017-05-13 PROCEDURE — 3331090001 HH PPS REVENUE CREDIT

## 2017-05-13 PROCEDURE — 3331090002 HH PPS REVENUE DEBIT

## 2017-05-14 PROCEDURE — 3331090002 HH PPS REVENUE DEBIT

## 2017-05-14 PROCEDURE — 3331090001 HH PPS REVENUE CREDIT

## 2017-05-15 PROCEDURE — 3331090002 HH PPS REVENUE DEBIT

## 2017-05-15 PROCEDURE — 3331090001 HH PPS REVENUE CREDIT

## 2017-05-16 ENCOUNTER — HOME CARE VISIT (OUTPATIENT)
Dept: SCHEDULING | Facility: HOME HEALTH | Age: 82
End: 2017-05-16
Payer: MEDICARE

## 2017-05-16 PROCEDURE — G0151 HHCP-SERV OF PT,EA 15 MIN: HCPCS

## 2017-05-16 PROCEDURE — 3331090001 HH PPS REVENUE CREDIT

## 2017-05-16 PROCEDURE — 3331090002 HH PPS REVENUE DEBIT

## 2017-05-17 VITALS
OXYGEN SATURATION: 99 % | SYSTOLIC BLOOD PRESSURE: 146 MMHG | DIASTOLIC BLOOD PRESSURE: 60 MMHG | HEART RATE: 63 BPM | TEMPERATURE: 98.8 F

## 2017-05-17 PROCEDURE — 3331090001 HH PPS REVENUE CREDIT

## 2017-05-17 PROCEDURE — 3331090002 HH PPS REVENUE DEBIT

## 2017-05-18 ENCOUNTER — HOME CARE VISIT (OUTPATIENT)
Dept: HOME HEALTH SERVICES | Facility: HOME HEALTH | Age: 82
End: 2017-05-18
Payer: MEDICARE

## 2017-05-18 PROCEDURE — 3331090001 HH PPS REVENUE CREDIT

## 2017-05-18 PROCEDURE — 3331090002 HH PPS REVENUE DEBIT

## 2017-05-18 PROCEDURE — G0151 HHCP-SERV OF PT,EA 15 MIN: HCPCS

## 2017-05-19 PROCEDURE — 3331090002 HH PPS REVENUE DEBIT

## 2017-05-19 PROCEDURE — 3331090001 HH PPS REVENUE CREDIT

## 2017-05-20 PROCEDURE — 3331090002 HH PPS REVENUE DEBIT

## 2017-05-20 PROCEDURE — 3331090001 HH PPS REVENUE CREDIT

## 2017-05-21 PROCEDURE — 3331090002 HH PPS REVENUE DEBIT

## 2017-05-21 PROCEDURE — 3331090001 HH PPS REVENUE CREDIT

## 2017-05-22 PROCEDURE — 3331090002 HH PPS REVENUE DEBIT

## 2017-05-22 PROCEDURE — 3331090001 HH PPS REVENUE CREDIT

## 2017-05-23 PROCEDURE — 3331090001 HH PPS REVENUE CREDIT

## 2017-05-23 PROCEDURE — 3331090002 HH PPS REVENUE DEBIT

## 2017-05-24 ENCOUNTER — HOME CARE VISIT (OUTPATIENT)
Dept: SCHEDULING | Facility: HOME HEALTH | Age: 82
End: 2017-05-24
Payer: MEDICARE

## 2017-05-24 VITALS
TEMPERATURE: 98 F | DIASTOLIC BLOOD PRESSURE: 70 MMHG | HEART RATE: 78 BPM | OXYGEN SATURATION: 98 % | SYSTOLIC BLOOD PRESSURE: 130 MMHG

## 2017-05-24 PROCEDURE — G0152 HHCP-SERV OF OT,EA 15 MIN: HCPCS

## 2017-05-24 PROCEDURE — 3331090002 HH PPS REVENUE DEBIT

## 2017-05-24 PROCEDURE — 3331090003 HH PPS REVENUE ADJ

## 2017-05-24 PROCEDURE — 3331090001 HH PPS REVENUE CREDIT

## 2017-05-24 PROCEDURE — G0151 HHCP-SERV OF PT,EA 15 MIN: HCPCS

## 2017-05-25 VITALS
SYSTOLIC BLOOD PRESSURE: 160 MMHG | OXYGEN SATURATION: 99 % | HEART RATE: 61 BPM | DIASTOLIC BLOOD PRESSURE: 70 MMHG | TEMPERATURE: 96.4 F

## 2017-05-25 PROCEDURE — 3331090002 HH PPS REVENUE DEBIT

## 2017-05-25 PROCEDURE — 3331090001 HH PPS REVENUE CREDIT

## 2017-05-26 PROCEDURE — 3331090002 HH PPS REVENUE DEBIT

## 2017-05-26 PROCEDURE — 3331090001 HH PPS REVENUE CREDIT

## 2017-05-27 PROCEDURE — 3331090002 HH PPS REVENUE DEBIT

## 2017-05-27 PROCEDURE — 3331090001 HH PPS REVENUE CREDIT

## 2017-05-28 PROCEDURE — 3331090001 HH PPS REVENUE CREDIT

## 2017-05-28 PROCEDURE — 3331090002 HH PPS REVENUE DEBIT

## 2017-05-29 PROCEDURE — 3331090002 HH PPS REVENUE DEBIT

## 2017-05-29 PROCEDURE — 3331090001 HH PPS REVENUE CREDIT

## 2017-05-30 PROCEDURE — 3331090001 HH PPS REVENUE CREDIT

## 2017-05-30 PROCEDURE — 3331090002 HH PPS REVENUE DEBIT

## 2017-05-31 PROCEDURE — 3331090001 HH PPS REVENUE CREDIT

## 2017-05-31 PROCEDURE — 3331090002 HH PPS REVENUE DEBIT

## 2017-06-08 ENCOUNTER — HOSPITAL ENCOUNTER (OUTPATIENT)
Dept: GENERAL RADIOLOGY | Age: 82
Discharge: HOME | End: 2017-06-08
Attending: INTERNAL MEDICINE
Payer: MEDICARE

## 2017-06-08 DIAGNOSIS — M54.6 ACUTE LEFT-SIDED THORACIC BACK PAIN: ICD-10-CM

## 2017-06-08 PROCEDURE — 72072 X-RAY EXAM THORAC SPINE 3VWS: CPT

## 2018-01-13 ENCOUNTER — HOSPITAL ENCOUNTER (INPATIENT)
Age: 83
LOS: 2 days | Discharge: HOME | DRG: 394 | End: 2018-01-15
Attending: STUDENT IN AN ORGANIZED HEALTH CARE EDUCATION/TRAINING PROGRAM | Admitting: INTERNAL MEDICINE
Payer: MEDICARE

## 2018-01-13 ENCOUNTER — APPOINTMENT (OUTPATIENT)
Dept: CT IMAGING | Age: 83
DRG: 394 | End: 2018-01-13
Attending: STUDENT IN AN ORGANIZED HEALTH CARE EDUCATION/TRAINING PROGRAM
Payer: MEDICARE

## 2018-01-13 DIAGNOSIS — K55.9 ISCHEMIC COLITIS (HCC): Primary | ICD-10-CM

## 2018-01-13 PROBLEM — K52.9 ACUTE COLITIS: Status: ACTIVE | Noted: 2018-01-13

## 2018-01-13 LAB
ALBUMIN SERPL-MCNC: 3.5 G/DL (ref 3.5–5)
ALBUMIN/GLOB SERPL: 1 {RATIO} (ref 1.1–2.2)
ALP SERPL-CCNC: 71 U/L (ref 45–117)
ALT SERPL-CCNC: 21 U/L (ref 12–78)
ANION GAP SERPL CALC-SCNC: 9 MMOL/L (ref 5–15)
AST SERPL-CCNC: 29 U/L (ref 15–37)
BASOPHILS # BLD: 0.1 K/UL (ref 0–0.1)
BASOPHILS NFR BLD: 1 % (ref 0–1)
BILIRUB SERPL-MCNC: 0.3 MG/DL (ref 0.2–1)
BUN SERPL-MCNC: 26 MG/DL (ref 6–20)
BUN/CREAT SERPL: 26 (ref 12–20)
CALCIUM SERPL-MCNC: 9 MG/DL (ref 8.5–10.1)
CHLORIDE SERPL-SCNC: 104 MMOL/L (ref 97–108)
CO2 SERPL-SCNC: 25 MMOL/L (ref 21–32)
CREAT SERPL-MCNC: 1 MG/DL (ref 0.55–1.02)
EOSINOPHIL # BLD: 0.1 K/UL (ref 0–0.4)
EOSINOPHIL NFR BLD: 1 % (ref 0–7)
ERYTHROCYTE [DISTWIDTH] IN BLOOD BY AUTOMATED COUNT: 12.4 % (ref 11.5–14.5)
GLOBULIN SER CALC-MCNC: 3.5 G/DL (ref 2–4)
GLUCOSE SERPL-MCNC: 195 MG/DL (ref 65–100)
HCT VFR BLD AUTO: 38.1 % (ref 35–47)
HGB BLD-MCNC: 12.3 G/DL (ref 11.5–16)
LACTATE SERPL-SCNC: 2.6 MMOL/L (ref 0.4–2)
LIPASE SERPL-CCNC: 206 U/L (ref 73–393)
LYMPHOCYTES # BLD: 1.6 K/UL (ref 0.8–3.5)
LYMPHOCYTES NFR BLD: 18 % (ref 12–49)
MCH RBC QN AUTO: 29.6 PG (ref 26–34)
MCHC RBC AUTO-ENTMCNC: 32.3 G/DL (ref 30–36.5)
MCV RBC AUTO: 91.8 FL (ref 80–99)
MONOCYTES # BLD: 0.7 K/UL (ref 0–1)
MONOCYTES NFR BLD: 8 % (ref 5–13)
NEUTS SEG # BLD: 6.5 K/UL (ref 1.8–8)
NEUTS SEG NFR BLD: 72 % (ref 32–75)
PLATELET # BLD AUTO: 231 K/UL (ref 150–400)
POTASSIUM SERPL-SCNC: 3.9 MMOL/L (ref 3.5–5.1)
PROT SERPL-MCNC: 7 G/DL (ref 6.4–8.2)
RBC # BLD AUTO: 4.15 M/UL (ref 3.8–5.2)
SODIUM SERPL-SCNC: 138 MMOL/L (ref 136–145)
WBC # BLD AUTO: 8.9 K/UL (ref 3.6–11)

## 2018-01-13 PROCEDURE — 36415 COLL VENOUS BLD VENIPUNCTURE: CPT | Performed by: STUDENT IN AN ORGANIZED HEALTH CARE EDUCATION/TRAINING PROGRAM

## 2018-01-13 PROCEDURE — 96376 TX/PRO/DX INJ SAME DRUG ADON: CPT

## 2018-01-13 PROCEDURE — 74011000250 HC RX REV CODE- 250: Performed by: STUDENT IN AN ORGANIZED HEALTH CARE EDUCATION/TRAINING PROGRAM

## 2018-01-13 PROCEDURE — 94761 N-INVAS EAR/PLS OXIMETRY MLT: CPT

## 2018-01-13 PROCEDURE — 65660000000 HC RM CCU STEPDOWN

## 2018-01-13 PROCEDURE — 74011250636 HC RX REV CODE- 250/636: Performed by: STUDENT IN AN ORGANIZED HEALTH CARE EDUCATION/TRAINING PROGRAM

## 2018-01-13 PROCEDURE — 80053 COMPREHEN METABOLIC PANEL: CPT | Performed by: STUDENT IN AN ORGANIZED HEALTH CARE EDUCATION/TRAINING PROGRAM

## 2018-01-13 PROCEDURE — 74011636320 HC RX REV CODE- 636/320: Performed by: STUDENT IN AN ORGANIZED HEALTH CARE EDUCATION/TRAINING PROGRAM

## 2018-01-13 PROCEDURE — 96375 TX/PRO/DX INJ NEW DRUG ADDON: CPT

## 2018-01-13 PROCEDURE — 96365 THER/PROPH/DIAG IV INF INIT: CPT

## 2018-01-13 PROCEDURE — 83605 ASSAY OF LACTIC ACID: CPT | Performed by: STUDENT IN AN ORGANIZED HEALTH CARE EDUCATION/TRAINING PROGRAM

## 2018-01-13 PROCEDURE — 96361 HYDRATE IV INFUSION ADD-ON: CPT

## 2018-01-13 PROCEDURE — 83690 ASSAY OF LIPASE: CPT | Performed by: STUDENT IN AN ORGANIZED HEALTH CARE EDUCATION/TRAINING PROGRAM

## 2018-01-13 PROCEDURE — 99285 EMERGENCY DEPT VISIT HI MDM: CPT

## 2018-01-13 PROCEDURE — 74011000258 HC RX REV CODE- 258: Performed by: STUDENT IN AN ORGANIZED HEALTH CARE EDUCATION/TRAINING PROGRAM

## 2018-01-13 PROCEDURE — 85025 COMPLETE CBC W/AUTO DIFF WBC: CPT | Performed by: STUDENT IN AN ORGANIZED HEALTH CARE EDUCATION/TRAINING PROGRAM

## 2018-01-13 PROCEDURE — 74177 CT ABD & PELVIS W/CONTRAST: CPT

## 2018-01-13 RX ORDER — DIAZEPAM 2 MG/1
2 TABLET ORAL
COMMUNITY
End: 2018-03-09 | Stop reason: CLARIF

## 2018-01-13 RX ORDER — THERA TABS 400 MCG
1 TAB ORAL DAILY
Status: DISCONTINUED | OUTPATIENT
Start: 2018-01-14 | End: 2018-01-15 | Stop reason: HOSPADM

## 2018-01-13 RX ORDER — SODIUM CHLORIDE 0.9 % (FLUSH) 0.9 %
5-10 SYRINGE (ML) INJECTION AS NEEDED
Status: DISCONTINUED | OUTPATIENT
Start: 2018-01-13 | End: 2018-01-15 | Stop reason: HOSPADM

## 2018-01-13 RX ORDER — METRONIDAZOLE 500 MG/100ML
500 INJECTION, SOLUTION INTRAVENOUS EVERY 8 HOURS
Status: DISCONTINUED | OUTPATIENT
Start: 2018-01-14 | End: 2018-01-15 | Stop reason: HOSPADM

## 2018-01-13 RX ORDER — LORATADINE 10 MG/1
10 TABLET ORAL
Status: DISCONTINUED | OUTPATIENT
Start: 2018-01-13 | End: 2018-01-15 | Stop reason: HOSPADM

## 2018-01-13 RX ORDER — MORPHINE SULFATE 2 MG/ML
4 INJECTION, SOLUTION INTRAMUSCULAR; INTRAVENOUS ONCE
Status: COMPLETED | OUTPATIENT
Start: 2018-01-13 | End: 2018-01-13

## 2018-01-13 RX ORDER — MORPHINE SULFATE 2 MG/ML
1 INJECTION, SOLUTION INTRAMUSCULAR; INTRAVENOUS
Status: DISCONTINUED | OUTPATIENT
Start: 2018-01-13 | End: 2018-01-15 | Stop reason: HOSPADM

## 2018-01-13 RX ORDER — ONDANSETRON 2 MG/ML
4 INJECTION INTRAMUSCULAR; INTRAVENOUS
Status: DISCONTINUED | OUTPATIENT
Start: 2018-01-13 | End: 2018-01-15 | Stop reason: HOSPADM

## 2018-01-13 RX ORDER — SODIUM CHLORIDE 9 MG/ML
50 INJECTION, SOLUTION INTRAVENOUS CONTINUOUS
Status: DISCONTINUED | OUTPATIENT
Start: 2018-01-13 | End: 2018-01-15 | Stop reason: HOSPADM

## 2018-01-13 RX ORDER — LISINOPRIL 20 MG/1
20 TABLET ORAL DAILY
Status: DISCONTINUED | OUTPATIENT
Start: 2018-01-14 | End: 2018-01-15 | Stop reason: HOSPADM

## 2018-01-13 RX ORDER — HEPARIN SODIUM 5000 [USP'U]/ML
5000 INJECTION, SOLUTION INTRAVENOUS; SUBCUTANEOUS EVERY 8 HOURS
Status: DISCONTINUED | OUTPATIENT
Start: 2018-01-14 | End: 2018-01-14

## 2018-01-13 RX ORDER — FLECAINIDE ACETATE 100 MG/1
100 TABLET ORAL 2 TIMES DAILY
Status: DISCONTINUED | OUTPATIENT
Start: 2018-01-14 | End: 2018-01-15 | Stop reason: HOSPADM

## 2018-01-13 RX ORDER — SODIUM CHLORIDE 0.9 % (FLUSH) 0.9 %
10 SYRINGE (ML) INJECTION
Status: COMPLETED | OUTPATIENT
Start: 2018-01-13 | End: 2018-01-13

## 2018-01-13 RX ORDER — ACETAMINOPHEN 325 MG/1
650 TABLET ORAL
Status: DISCONTINUED | OUTPATIENT
Start: 2018-01-13 | End: 2018-01-15 | Stop reason: HOSPADM

## 2018-01-13 RX ORDER — ONDANSETRON 2 MG/ML
4 INJECTION INTRAMUSCULAR; INTRAVENOUS
Status: COMPLETED | OUTPATIENT
Start: 2018-01-13 | End: 2018-01-13

## 2018-01-13 RX ORDER — LOVASTATIN 20 MG/1
20 TABLET ORAL DAILY
Status: DISCONTINUED | OUTPATIENT
Start: 2018-01-14 | End: 2018-01-15 | Stop reason: HOSPADM

## 2018-01-13 RX ORDER — PANTOPRAZOLE SODIUM 40 MG/1
40 TABLET, DELAYED RELEASE ORAL DAILY
Status: DISCONTINUED | OUTPATIENT
Start: 2018-01-14 | End: 2018-01-15 | Stop reason: HOSPADM

## 2018-01-13 RX ORDER — FLUTICASONE PROPIONATE 50 MCG
2 SPRAY, SUSPENSION (ML) NASAL AS NEEDED
Status: DISCONTINUED | OUTPATIENT
Start: 2018-01-13 | End: 2018-01-15 | Stop reason: HOSPADM

## 2018-01-13 RX ORDER — AMLODIPINE BESYLATE 5 MG/1
2.5 TABLET ORAL AS NEEDED
Status: DISCONTINUED | OUTPATIENT
Start: 2018-01-13 | End: 2018-01-15

## 2018-01-13 RX ORDER — SODIUM CHLORIDE 0.9 % (FLUSH) 0.9 %
5-10 SYRINGE (ML) INJECTION EVERY 8 HOURS
Status: DISCONTINUED | OUTPATIENT
Start: 2018-01-13 | End: 2018-01-15 | Stop reason: HOSPADM

## 2018-01-13 RX ORDER — METRONIDAZOLE 500 MG/100ML
500 INJECTION, SOLUTION INTRAVENOUS
Status: COMPLETED | OUTPATIENT
Start: 2018-01-13 | End: 2018-01-13

## 2018-01-13 RX ORDER — ASPIRIN 81 MG/1
81 TABLET ORAL DAILY
Status: DISCONTINUED | OUTPATIENT
Start: 2018-01-14 | End: 2018-01-15 | Stop reason: HOSPADM

## 2018-01-13 RX ORDER — FERROUS SULFATE, DRIED 160(50) MG
1 TABLET, EXTENDED RELEASE ORAL DAILY
Status: DISCONTINUED | OUTPATIENT
Start: 2018-01-14 | End: 2018-01-15 | Stop reason: HOSPADM

## 2018-01-13 RX ORDER — DIAZEPAM 2 MG/1
2 TABLET ORAL
Status: DISCONTINUED | OUTPATIENT
Start: 2018-01-13 | End: 2018-01-15 | Stop reason: HOSPADM

## 2018-01-13 RX ADMIN — MORPHINE SULFATE 4 MG: 2 INJECTION, SOLUTION INTRAMUSCULAR; INTRAVENOUS at 17:33

## 2018-01-13 RX ADMIN — Medication 10 ML: at 18:54

## 2018-01-13 RX ADMIN — MORPHINE SULFATE 4 MG: 2 INJECTION, SOLUTION INTRAMUSCULAR; INTRAVENOUS at 20:01

## 2018-01-13 RX ADMIN — SODIUM CHLORIDE 1000 ML: 900 INJECTION, SOLUTION INTRAVENOUS at 17:33

## 2018-01-13 RX ADMIN — IOPAMIDOL 100 ML: 755 INJECTION, SOLUTION INTRAVENOUS at 18:54

## 2018-01-13 RX ADMIN — METRONIDAZOLE 500 MG: 500 INJECTION, SOLUTION INTRAVENOUS at 20:17

## 2018-01-13 RX ADMIN — SODIUM CHLORIDE 100 ML: 900 INJECTION, SOLUTION INTRAVENOUS at 18:54

## 2018-01-13 RX ADMIN — ONDANSETRON 4 MG: 2 INJECTION INTRAMUSCULAR; INTRAVENOUS at 18:32

## 2018-01-13 NOTE — ED PROVIDER NOTES
HPI Comments: 80 y.o. female with past medical history significant for osteoporosis, hypercholesterolemia, PAF, SSS, HTN, acute ischemic colitis, cardiac cath, CAD, renal artery stents, rhinitis, bilateral carotid stenosis, and GERD who presents via EMS with chief complaint of abdominal pain. Patient reports \"constant\" LLQ abdominal pain and vomiting with onset \"earlier today,\" describes her pain as a \"9/10. \" Patient reports a history of colitis; reports her symptoms today are similar to her past colitis symptoms. Patient reports she is usually treated with fluids, Morphine, Flagyl, and oxygen therapy. Patient denies being on any antibiotics in the last ~1 month. Patient reports a history of a cholecystectomy and a hysterectomy. Patient denies fever, SOB, and chest pain. There are no other acute medical concerns at this time. PCP: Danette Shore MD    Note written by Bertin Arzate, as dictated by Aubree Bhardwaj MD 4:37 PM     The history is provided by the patient and a relative. Past Medical History:   Diagnosis Date    Acute ischemic colitis ([address]     Arrhythmia     Paroxysmal  Afib,  SSS    Carotid stenosis, bilateral     moderate , right >left    GERD (gastroesophageal reflux disease) 12/10/2011    HTN (hypertension) 12/8/2011    HX OTHER MEDICAL 2011    cardiac cath ;non obstructive CAD. Renal artery stents; open    HX OTHER MEDICAL 2011    LLQ abd. pain thought due to spasm.     Hypercholesterolemia     Hypertension     Osteoporosis     Osteoporosis 12/8/2011    PAF (paroxysmal atrial fibrillation) ([address] 12/8/2011    Rhinitis 12/8/2011    SSS (sick sinus syndrome) ([address] 12/8/2011       Past Surgical History:   Procedure Laterality Date    ENDOSCOPY, COLON, DIAGNOSTIC  3/2/11    diverticulosis,hemorrhoids    HX CHOLECYSTECTOMY      HX HYSTERECTOMY      1960    HX OTHER SURGICAL      stent left leg    HX TONSILLECTOMY           Family History:   Problem Relation Age of Onset    Hypertension Mother        Social History     Social History    Marital status:      Spouse name: N/A    Number of children: N/A    Years of education: N/A     Occupational History    Not on file. Social History Main Topics    Smoking status: Never Smoker    Smokeless tobacco: Not on file    Alcohol use No    Drug use: No    Sexual activity: Not on file     Other Topics Concern    Not on file     Social History Narrative         ALLERGIES: Beta blocker [beta-blockers (beta-adrenergic blocking agts)]; Amoxicillin; Codeine; Donnatal [phenobarb-hyoscy-atropine-scop]; Exelon [rivastigmine]; Plavix [clopidogrel]; and Sular [nisoldipine]    Review of Systems   Constitutional: Negative for chills and fever. HENT: Negative for sore throat. Respiratory: Negative for cough and shortness of breath. Cardiovascular: Negative for chest pain. Gastrointestinal: Positive for abdominal pain and vomiting. Genitourinary: Negative for dysuria. Musculoskeletal: Negative for back pain. Skin: Negative for rash. Neurological: Negative for syncope and headaches. Psychiatric/Behavioral: Negative for confusion. All other systems reviewed and are negative. Vitals:    01/13/18 1634   Pulse: 75   Resp: 18   Temp: 97.6 °F (36.4 °C)   SpO2: 100%   Weight: 59 kg (130 lb)   Height: 5' 6\" (1.676 m)       Physical Exam   Constitutional: She is oriented to person, place, and time. She appears well-developed. She appears distressed. In mild distress. HENT:   Head: Normocephalic and atraumatic. Eyes: Conjunctivae and EOM are normal. Pupils are equal, round, and reactive to light. Neck: Normal range of motion. Neck supple. Cardiovascular: Normal rate, regular rhythm and normal heart sounds. No murmur heard. Pulmonary/Chest: Effort normal and breath sounds normal. No respiratory distress. Abdominal: Soft. Bowel sounds are normal. She exhibits no distension.  There is tenderness. There is no rebound. Mild LLQ tenderness to palpation. Musculoskeletal: Normal range of motion. She exhibits no edema. Neurological: She is alert and oriented to person, place, and time. No cranial nerve deficit. She exhibits normal muscle tone. Coordination normal.   Skin: Skin is warm and dry. No rash noted. Psychiatric: She has a normal mood and affect. Her behavior is normal.   Nursing note and vitals reviewed. Note written by Bertin Velarde, as dictated by Judit Richardson MD 4:43 PM      [facility]  ED Course       Procedures    CONSULT NOTE:  4:48 PM Judit Richardson MD spoke with Dr. Alhaji Turner MD, Consult for Patient's PCP. Discussed available diagnostic tests and clinical findings. Provider is in agreement with care plans as outlined. Dr. Alhaji Turner MD is aware and will follow the patient. CONSULT NOTE:  9:16 PM Judit Richardson MD spoke with Dr. Davidson Mosqueda, Consult for Hospitalist. Discussed available diagnostic tests and clinical findings. Provider is in agreement with care plans as outlined. Dr. Davidson Mosqueda will see and admit the patient.

## 2018-01-13 NOTE — IP AVS SNAPSHOT
[address] 
[phone] Patient: Stephen Peck MRN: [id number] :8/10/1926 A check mariza indicates which time of day the medication should be taken. My Medications START taking these medications Instructions Each Dose to Equal  
 Morning Noon Evening Bedtime  
 metroNIDAZOLE 500 mg tablet Commonly known as:  FLAGYL Your last dose was: Your next dose is: Take 1 Tab by mouth three (3) times daily. 500 mg  
    
   
   
   
  
 sodium chloride 0.65 % nasal spray Commonly known as:  OCEAN Your last dose was: Your next dose is: 2 Sprays by Both Nostrils route every two (2) hours as needed for Congestion. 2 Spray CONTINUE taking these medications Instructions Each Dose to Equal  
 Morning Noon Evening Bedtime  
 aspirin delayed-release 81 mg tablet Your last dose was: Your next dose is: Take 81 mg by mouth daily. 81 mg CALCIUM 600 + D 600-125 mg-unit Tab Generic drug:  calcium-cholecalciferol (d3) Your last dose was: Your next dose is: Take 1 Tab by mouth daily. 1 Tab CENTRUM SILVER PO Your last dose was: Your next dose is: Take 1 Tab by mouth daily. 1 Tab CLARITIN 10 mg tablet Generic drug:  loratadine Your last dose was: Your next dose is: Take 10 mg by mouth daily as needed. 10 mg  
    
   
   
   
  
 diazePAM 2 mg tablet Commonly known as:  VALIUM Your last dose was: Your next dose is: Take 2 mg by mouth every six (6) hours as needed for Anxiety. 2 mg  
    
   
   
   
  
 doxazosin 1 mg tablet Commonly known as:  CARDURA Your last dose was: Your next dose is: 1/2 tablet po qhs  
     
   
   
   
  
 flecainide 100 mg tablet Commonly known as:  TAMBOCOR Your last dose was: Your next dose is: Take 100 mg by mouth two (2) times a day. 100 mg FLONASE 50 mcg/actuation nasal spray Generic drug:  fluticasone Your last dose was: Your next dose is: 2 Sprays by Both Nostrils route as needed for Rhinitis. 2 Spray HYDROcodone-acetaminophen 5-325 mg per tablet Commonly known as:  Cristal Acevedo Your last dose was: Your next dose is: Take 1 Tab by mouth two (2) times daily as needed for Pain. Max Daily Amount: 2 Tabs. Indications: Pain 1 Tab  
    
   
   
   
  
 lisinopril 20 mg tablet Commonly known as:  Ronald Drew Your last dose was: Your next dose is: TAKE 1 TABLET DAILY lovastatin 20 mg tablet Commonly known as:  MEVACOR Your last dose was: Your next dose is: TAKE 1 TABLET DAILY MIRALAX 17 gram/dose powder Generic drug:  polyethylene glycol Your last dose was: Your next dose is: Take 17 g by mouth daily. As needed 17 g * NORVASC 5 mg tablet Generic drug:  amLODIPine Your last dose was: Your next dose is: Take 5 mg by mouth nightly. 5 mg * NORVASC 2.5 mg tablet Generic drug:  amLODIPine Your last dose was: Your next dose is: Take 2.5 mg by mouth as needed (SBP > 160). 2.5 mg  
    
   
   
   
  
 * amLODIPine 5 mg tablet Commonly known as:  Lola Hernandez Your last dose was: Your next dose is: TAKE 1 TABLET DAILY pantoprazole 40 mg tablet Commonly known as:  PROTONIX Your last dose was: Your next dose is: TAKE 1 TABLET DAILY IN PLACE OF OMEPRAZOLE * Notice: This list has 3 medication(s) that are the same as other medications prescribed for you. Read the directions carefully, and ask your doctor or other care provider to review them with you. Where to Get Your Medications These medications were sent to [facility], [address] [place] [address] AT [address], [facility] Hours:  24-hours Phone:  [phone]  
  metroNIDAZOLE 500 mg tablet Information on where to get these meds will be given to you by the nurse or doctor. ! Ask your nurse or doctor about these medications  
  sodium chloride 0.65 % nasal spray

## 2018-01-13 NOTE — IP AVS SNAPSHOT
[address] 
[phone] Patient: Cheyenne Peck MRN: [id number] :8/10/1926 About your hospitalization You were admitted on:  2018 You last received care in the:  [facility] 6S NEURO-SCI TELE You were discharged on:  January 15, 2018 Why you were hospitalized Your primary diagnosis was:  Acute Colitis Follow-up Information Follow up With Details Comments Contact Info Jessenia Morrow MD   [id number] [address] 
[phone] Discharge Orders None A check mariza indicates which time of day the medication should be taken. My Medications START taking these medications Instructions Each Dose to Equal  
 Morning Noon Evening Bedtime  
 metroNIDAZOLE 500 mg tablet Commonly known as:  FLAGYL Your last dose was: Your next dose is: Take 1 Tab by mouth three (3) times daily. 500 mg  
    
   
   
   
  
 sodium chloride 0.65 % nasal spray Commonly known as:  OCEAN Your last dose was: Your next dose is: 2 Sprays by Both Nostrils route every two (2) hours as needed for Congestion. 2 Spray CONTINUE taking these medications Instructions Each Dose to Equal  
 Morning Noon Evening Bedtime  
 aspirin delayed-release 81 mg tablet Your last dose was: Your next dose is: Take 81 mg by mouth daily. 81 mg CALCIUM 600 + D 600-125 mg-unit Tab Generic drug:  calcium-cholecalciferol (d3) Your last dose was: Your next dose is: Take 1 Tab by mouth daily. 1 Tab CENTRUM SILVER PO Your last dose was: Your next dose is: Take 1 Tab by mouth daily. 1 Tab CLARITIN 10 mg tablet Generic drug:  loratadine Your last dose was: Your next dose is: Take 10 mg by mouth daily as needed. 10 mg  
    
   
   
   
  
 diazePAM 2 mg tablet Commonly known as:  VALIUM Your last dose was: Your next dose is: Take 2 mg by mouth every six (6) hours as needed for Anxiety. 2 mg  
    
   
   
   
  
 doxazosin 1 mg tablet Commonly known as:  CARDURA Your last dose was: Your next dose is:    
   
   
 1/2 tablet po qhs  
     
   
   
   
  
 flecainide 100 mg tablet Commonly known as:  TAMBOCOR Your last dose was: Your next dose is: Take 100 mg by mouth two (2) times a day. 100 mg FLONASE 50 mcg/actuation nasal spray Generic drug:  fluticasone Your last dose was: Your next dose is: 2 Sprays by Both Nostrils route as needed for Rhinitis. 2 Spray HYDROcodone-acetaminophen 5-325 mg per tablet Commonly known as:  Danielle Catherine Your last dose was: Your next dose is: Take 1 Tab by mouth two (2) times daily as needed for Pain. Max Daily Amount: 2 Tabs. Indications: Pain 1 Tab  
    
   
   
   
  
 lisinopril 20 mg tablet Commonly known as:  Luis Crowder Your last dose was: Your next dose is: TAKE 1 TABLET DAILY lovastatin 20 mg tablet Commonly known as:  MEVACOR Your last dose was: Your next dose is: TAKE 1 TABLET DAILY MIRALAX 17 gram/dose powder Generic drug:  polyethylene glycol Your last dose was: Your next dose is: Take 17 g by mouth daily. As needed 17 g * NORVASC 5 mg tablet Generic drug:  amLODIPine Your last dose was: Your next dose is: Take 5 mg by mouth nightly. 5 mg * NORVASC 2.5 mg tablet Generic drug:  amLODIPine Your last dose was: Your next dose is: Take 2.5 mg by mouth as needed (SBP > 160). 2.5 mg  
    
   
   
   
  
 * amLODIPine 5 mg tablet Commonly known as:  Caleb Free Your last dose was: Your next dose is: TAKE 1 TABLET DAILY pantoprazole 40 mg tablet Commonly known as:  PROTONIX Your last dose was: Your next dose is: TAKE 1 TABLET DAILY IN PLACE OF OMEPRAZOLE * Notice: This list has 3 medication(s) that are the same as other medications prescribed for you. Read the directions carefully, and ask your doctor or other care provider to review them with you. Where to Get Your Medications These medications were sent to [facility], [address] [place] [address] AT [facility] [address], [address] [place] [address] Hours:  24-hours Phone:  [phone]  
  metroNIDAZOLE 500 mg tablet Information on where to get these meds will be given to you by the nurse or doctor. ! Ask your nurse or doctor about these medications  
  sodium chloride 0.65 % nasal spray Discharge Instructions Patient Discharge Instructions Doni Arriaga / [id number] : 8/10/1926 Admitted 2018 Discharged: 1/15/2018 Take Home Medications · It is important that you take the medication exactly as they are prescribed. · Keep your medication in the bottles provided by the pharmacist and keep a list of the medication names, dosages, and times to be taken in your wallet. · Do not take other medications without consulting your doctor. What to do at [facility] Recommended diet: soft, bland diet Recommended activity: gradually increase If you experience any of the following symptoms : abdominal pain, dizziness , please follow up with Dr. Edgar Alfred at [phone] Follow-up with Dr. Mira Coyle ([phone]) on 1/23 or 1/24/18 . Call for an appointment Information obtained by : 
I understand that if any problems occur once I am at home I am to contact my physician. I understand and acknowledge receipt of the instructions indicated above. Physician's or R.N.'s Signature                                                                  Date/Time Patient or Representative Signature                                                          Date/Time Introducing [facility] SERVICES! [facility] introduces [employer] patient portal. Now you can access parts of your medical record, email your doctor's office, and request medication refills online. 1. In your internet browser, go to [email or web address] 2. Click on the First Time User? Click Here link in the Sign In box. You will see the New Member Sign Up page. 3. Enter your [employer] Access Code exactly as it appears below. You will not need to use this code after youve completed the sign-up process. If you do not sign up before the expiration date, you must request a new code. · [employer] Access Code: [email or web address] Expires: 4/15/2018  7:00 PM 
 
4. Enter the last four digits of your Social Security Number (xxxx) and Date of Birth (mm/dd/yyyy) as indicated and click Submit. You will be taken to the next sign-up page. 5. Create a [employer] ID. This will be your [employer] login ID and cannot be changed, so think of one that is secure and easy to remember. 6. Create a [employer] password. You can change your password at any time. 7. Enter your Password Reset Question and Answer.  This can be used at a later time if you forget your password. 8. Enter your e-mail address. You will receive e-mail notification when new information is available in [address] 9. Click Sign Up. You can now view and download portions of your medical record. 10. Click the Download Summary menu link to download a portable copy of your medical information. If you have questions, please visit the Frequently Asked Questions section of the [email or web address] website. Remember, [employer] is NOT to be used for urgent needs. For medical emergencies, dial 911. Now available from your iPhone and Android! Providers Seen During Your Hospitalization Provider Specialty Primary office phone Emmy Hare MD Emergency Medicine [phone] Margaret Grant MD Hospitalist [phone] Pat Monroe MD Hospitalist [phone] Jessenia Morrow MD Internal Medicine [phone] Your Primary Care Physician (PCP) Primary Care Physician Office Phone [facility] [phone] You are allergic to the following Allergen Reactions Beta Blocker (Beta-Blockers (Beta-Adrenergic Blocking Agts)) Other (comments) Sick sinus syndrome Amoxicillin Nausea and Vomiting Codeine Nausea Only Donnatal (Phenobarb-Hyoscy-Atropine-Scop) Unknown (comments) Exelon (Rivastigmine) Other (comments) Nausea Plavix (Clopidogrel) Rash Sular (Nisoldipine) Vertigo Recent Documentation Height Weight BMI OB Status Smoking Status 1.676 m 60.7 kg 21.6 kg/m2 Hysterectomy Never Smoker Emergency Contacts Name Discharge Info Relation Home Work Mobile Larry([facility])Salma DISCHARGE CAREGIVER [3] Other Relative [6] [phone] Misha,(Dtr) Serjio Greer CAREGIVER [3] Child [2] [phone] Liv,(Son)Hiren  Child [2] [phone] Patient Belongings The following personal items are in your possession at time of discharge: 
  Dental Appliances: None  Visual Aid: Glasses, With patient      Home Medications: Locked   Jewelry: Necklace, Ring  Clothing: Pajamas, Bathrobe, Pants, Shirt, Footwear    Other Valuables: None Please provide this summary of care documentation to your next provider. Signatures-by signing, you are acknowledging that this After Visit Summary has been reviewed with you and you have received a copy. Patient Signature:  ____________________________________________________________ Date:  ____________________________________________________________  
  
[facility] Provider Signature:  ____________________________________________________________ Date:  ____________________________________________________________

## 2018-01-14 LAB
ANION GAP SERPL CALC-SCNC: 6 MMOL/L (ref 5–15)
BUN SERPL-MCNC: 21 MG/DL (ref 6–20)
BUN/CREAT SERPL: 27 (ref 12–20)
CALCIUM SERPL-MCNC: 8.3 MG/DL (ref 8.5–10.1)
CHLORIDE SERPL-SCNC: 110 MMOL/L (ref 97–108)
CO2 SERPL-SCNC: 25 MMOL/L (ref 21–32)
CREAT SERPL-MCNC: 0.77 MG/DL (ref 0.55–1.02)
ERYTHROCYTE [DISTWIDTH] IN BLOOD BY AUTOMATED COUNT: 12.6 % (ref 11.5–14.5)
GLUCOSE SERPL-MCNC: 87 MG/DL (ref 65–100)
HCT VFR BLD AUTO: 34.8 % (ref 35–47)
HGB BLD-MCNC: 11.4 G/DL (ref 11.5–16)
LACTATE SERPL-SCNC: 0.8 MMOL/L (ref 0.4–2)
MCH RBC QN AUTO: 29.8 PG (ref 26–34)
MCHC RBC AUTO-ENTMCNC: 32.8 G/DL (ref 30–36.5)
MCV RBC AUTO: 90.9 FL (ref 80–99)
PLATELET # BLD AUTO: 203 K/UL (ref 150–400)
POTASSIUM SERPL-SCNC: 3.7 MMOL/L (ref 3.5–5.1)
RBC # BLD AUTO: 3.83 M/UL (ref 3.8–5.2)
SODIUM SERPL-SCNC: 141 MMOL/L (ref 136–145)
WBC # BLD AUTO: 12 K/UL (ref 3.6–11)

## 2018-01-14 PROCEDURE — 36415 COLL VENOUS BLD VENIPUNCTURE: CPT | Performed by: INTERNAL MEDICINE

## 2018-01-14 PROCEDURE — 74011250637 HC RX REV CODE- 250/637: Performed by: INTERNAL MEDICINE

## 2018-01-14 PROCEDURE — 83605 ASSAY OF LACTIC ACID: CPT | Performed by: INTERNAL MEDICINE

## 2018-01-14 PROCEDURE — 74011000250 HC RX REV CODE- 250: Performed by: INTERNAL MEDICINE

## 2018-01-14 PROCEDURE — 74011250636 HC RX REV CODE- 250/636: Performed by: INTERNAL MEDICINE

## 2018-01-14 PROCEDURE — 80048 BASIC METABOLIC PNL TOTAL CA: CPT | Performed by: INTERNAL MEDICINE

## 2018-01-14 PROCEDURE — 85027 COMPLETE CBC AUTOMATED: CPT | Performed by: INTERNAL MEDICINE

## 2018-01-14 PROCEDURE — 65660000000 HC RM CCU STEPDOWN

## 2018-01-14 RX ADMIN — LISINOPRIL 20 MG: 20 TABLET ORAL at 08:34

## 2018-01-14 RX ADMIN — SODIUM CHLORIDE 100 ML/HR: 900 INJECTION, SOLUTION INTRAVENOUS at 00:05

## 2018-01-14 RX ADMIN — Medication 10 ML: at 14:00

## 2018-01-14 RX ADMIN — DIAZEPAM 2 MG: 2 TABLET ORAL at 20:45

## 2018-01-14 RX ADMIN — Medication 10 ML: at 00:05

## 2018-01-14 RX ADMIN — FLECAINIDE ACETATE 100 MG: 100 TABLET ORAL at 17:34

## 2018-01-14 RX ADMIN — PANTOPRAZOLE SODIUM 40 MG: 40 TABLET, DELAYED RELEASE ORAL at 08:34

## 2018-01-14 RX ADMIN — ASPIRIN 81 MG: 81 TABLET, COATED ORAL at 08:34

## 2018-01-14 RX ADMIN — CALCIUM CARBONATE-VITAMIN D TAB 500 MG-200 UNIT 1 TABLET: 500-200 TAB at 08:33

## 2018-01-14 RX ADMIN — METRONIDAZOLE 500 MG: 500 INJECTION, SOLUTION INTRAVENOUS at 08:33

## 2018-01-14 RX ADMIN — METRONIDAZOLE 500 MG: 500 INJECTION, SOLUTION INTRAVENOUS at 17:40

## 2018-01-14 RX ADMIN — MORPHINE SULFATE 1 MG: 2 INJECTION, SOLUTION INTRAMUSCULAR; INTRAVENOUS at 00:38

## 2018-01-14 RX ADMIN — THERA TABS 1 TABLET: TAB at 08:34

## 2018-01-14 RX ADMIN — METRONIDAZOLE 500 MG: 500 INJECTION, SOLUTION INTRAVENOUS at 00:37

## 2018-01-14 RX ADMIN — FLECAINIDE ACETATE 100 MG: 100 TABLET ORAL at 08:33

## 2018-01-14 RX ADMIN — Medication 10 ML: at 20:45

## 2018-01-14 RX ADMIN — SODIUM CHLORIDE 100 ML/HR: 900 INJECTION, SOLUTION INTRAVENOUS at 17:42

## 2018-01-14 RX ADMIN — LOVASTATIN 20 MG: 20 TABLET ORAL at 08:34

## 2018-01-14 NOTE — PROGRESS NOTES
Medical Progress Note      NAME: Ros Albert   :  8/10/1926  MRM:  [id number]    Date/Time: 2018           Problem List:     Principal Problem:    Acute colitis (2018)             Subjective:     LLQ abd pain is improved. Last bm about 2 am today was soft , w/o visible blood. Denies nausea. Tolerating clear liquids. Denies dizziness. Past Medical History:   Diagnosis Date    Acute ischemic colitis ([facility]     Arrhythmia     Paroxysmal  Afib,  SSS    Carotid stenosis, bilateral     moderate , right >left    GERD (gastroesophageal reflux disease) 12/10/2011    HTN (hypertension) 2011    HX OTHER MEDICAL     cardiac cath ;non obstructive CAD. Renal artery stents; open    HX OTHER MEDICAL     LLQ abd. pain thought due to spasm.  Hypercholesterolemia     Hypertension     Osteoporosis     Osteoporosis 2011    PAF (paroxysmal atrial fibrillation) ([facility] [address] 2011    PVD (peripheral vascular disease) ([facility]     Rhinitis 2011    SSS (sick sinus syndrome) ([facility] [address] 2011            Objective:         Vitals:      Last 24hrs VS reviewed since prior progress note. Most recent are:    Visit Vitals    /54 (BP 1 Location: Left arm)    Pulse 67    Temp 98 °F (36.7 °C)    Resp 18    Ht 5' 6\" (1.676 m)    Wt 131 lb 3.2 oz (59.5 kg)    SpO2 96%    BMI 21.18 kg/m2     SpO2 Readings from Last 6 Encounters:   18 96%   17 98%   17 99%   17 99%   17 98%   17 98%    O2 Flow Rate (L/min): 2 l/min     Intake/Output Summary (Last 24 hours) at 18 1152  Last data filed at 18 1012   Gross per 24 hour   Intake          1331.67 ml   Output                0 ml   Net          1331.67 ml                  Exam:      General:  Alert, cooperative, no distress, appears stated age. Lungs:   Clear to auscultation bilaterally. Heart:  Regular rate and rhythm, S1, S2 normal, no murmur, click, rub or gallop.    Abdomen:   Soft, minimal LLQ tenderness ? rebound. . Bowel sounds normal. No masses,  No organomegaly. Extremities: 2+ PT pulses. No  edema. Lab Data Reviewed: (see below)  Recent Results (from the past 24 hour(s))   CBC WITH AUTOMATED DIFF    Collection Time: 01/13/18  4:50 PM   Result Value Ref Range    WBC 8.9 3.6 - 11.0 K/uL    RBC 4.15 3.80 - 5.20 M/uL    HGB 12.3 11.5 - 16.0 g/dL    HCT 38.1 35.0 - 47.0 %    MCV 91.8 80.0 - 99.0 FL    MCH 29.6 26.0 - 34.0 PG    MCHC 32.3 30.0 - 36.5 g/dL    RDW 12.4 11.5 - 14.5 %    PLATELET 785 530 - 792 K/uL    NEUTROPHILS 72 32 - 75 %    LYMPHOCYTES 18 12 - 49 %    MONOCYTES 8 5 - 13 %    EOSINOPHILS 1 0 - 7 %    BASOPHILS 1 0 - 1 %    ABS. NEUTROPHILS 6.5 1.8 - 8.0 K/UL    ABS. LYMPHOCYTES 1.6 0.8 - 3.5 K/UL    ABS. MONOCYTES 0.7 0.0 - 1.0 K/UL    ABS. EOSINOPHILS 0.1 0.0 - 0.4 K/UL    ABS. BASOPHILS 0.1 0.0 - 0.1 K/UL   METABOLIC PANEL, COMPREHENSIVE    Collection Time: 01/13/18  4:50 PM   Result Value Ref Range    Sodium 138 136 - 145 mmol/L    Potassium 3.9 3.5 - 5.1 mmol/L    Chloride 104 97 - 108 mmol/L    CO2 25 21 - 32 mmol/L    Anion gap 9 5 - 15 mmol/L    Glucose 195 (H) 65 - 100 mg/dL    BUN 26 (H) 6 - 20 MG/DL    Creatinine 1.00 0.55 - 1.02 MG/DL    BUN/Creatinine ratio 26 (H) 12 - 20      GFR est AA >60 >60 ml/min/1.73m2    GFR est non-AA 52 (L) >60 ml/min/1.73m2    Calcium 9.0 8.5 - 10.1 MG/DL    Bilirubin, total 0.3 0.2 - 1.0 MG/DL    ALT (SGPT) 21 12 - 78 U/L    AST (SGOT) 29 15 - 37 U/L    Alk.  phosphatase 71 45 - 117 U/L    Protein, total 7.0 6.4 - 8.2 g/dL    Albumin 3.5 3.5 - 5.0 g/dL    Globulin 3.5 2.0 - 4.0 g/dL    A-G Ratio 1.0 (L) 1.1 - 2.2     LIPASE    Collection Time: 01/13/18  4:50 PM   Result Value Ref Range    Lipase 206 73 - 393 U/L   LACTIC ACID    Collection Time: 01/13/18  5:40 PM   Result Value Ref Range    Lactic acid 2.6 (HH) 0.4 - 2.0 MMOL/L   METABOLIC PANEL, BASIC    Collection Time: 01/14/18  5:16 AM   Result Value Ref Range    Sodium 141 136 - 145 mmol/L Potassium 3.7 3.5 - 5.1 mmol/L    Chloride 110 (H) 97 - 108 mmol/L    CO2 25 21 - 32 mmol/L    Anion gap 6 5 - 15 mmol/L    Glucose 87 65 - 100 mg/dL    BUN 21 (H) 6 - 20 MG/DL    Creatinine 0.77 0.55 - 1.02 MG/DL    BUN/Creatinine ratio 27 (H) 12 - 20      GFR est AA >60 >60 ml/min/1.73m2    GFR est non-AA >60 >60 ml/min/1.73m2    Calcium 8.3 (L) 8.5 - 10.1 MG/DL   CBC W/O DIFF    Collection Time: 01/14/18  5:16 AM   Result Value Ref Range    WBC 12.0 (H) 3.6 - 11.0 K/uL    RBC 3.83 3.80 - 5.20 M/uL    HGB 11.4 (L) 11.5 - 16.0 g/dL    HCT 34.8 (L) 35.0 - 47.0 %    MCV 90.9 80.0 - 99.0 FL    MCH 29.8 26.0 - 34.0 PG    MCHC 32.8 30.0 - 36.5 g/dL    RDW 12.6 11.5 - 14.5 %    PLATELET 350 136 - 997 K/uL   LACTIC ACID    Collection Time: 01/14/18  5:16 AM   Result Value Ref Range    Lactic acid 0.8 0.4 - 2.0 MMOL/L       Medications Reviewed: (see below)    ______________________________________________________________________    Medications:     Current Facility-Administered Medications   Medication Dose Route Frequency    amLODIPine (NORVASC) tablet 2.5 mg  2.5 mg Oral PRN    aspirin delayed-release tablet 81 mg  81 mg Oral DAILY    calcium-vitamin D (OS-SILVIA) 500 mg-200 unit tablet  1 Tab Oral DAILY    diazePAM (VALIUM) tablet 2 mg  2 mg Oral Q6H PRN    flecainide (TAMBOCOR) tablet 100 mg  100 mg Oral BID    fluticasone (FLONASE) 50 mcg/actuation nasal spray 2 Spray  2 Spray Both Nostrils PRN    therapeutic multivitamin (THERAGRAN) tablet 1 Tab  1 Tab Oral DAILY    lisinopril (PRINIVIL, ZESTRIL) tablet 20 mg  20 mg Oral DAILY    loratadine (CLARITIN) tablet 10 mg  10 mg Oral DAILY PRN    lovastatin (MEVACOR) tablet 20 mg  20 mg Oral DAILY    pantoprazole (PROTONIX) tablet 40 mg  40 mg Oral DAILY    sodium chloride (NS) flush 5-10 mL  5-10 mL IntraVENous Q8H    sodium chloride (NS) flush 5-10 mL  5-10 mL IntraVENous PRN    morphine injection 1 mg  1 mg IntraVENous Q3H PRN    ondansetron (ZOFRAN) injection 4 mg  4 mg IntraVENous Q4H PRN    acetaminophen (TYLENOL) tablet 650 mg  650 mg Oral Q4H PRN    0.9% sodium chloride infusion  100 mL/hr IntraVENous CONTINUOUS    metroNIDAZOLE (FLAGYL) IVPB premix 500 mg  500 mg IntraVENous Q8H                   Assessment:   Probable recurrent Ischemic Colitis. Improving . P: same rx . GI to see. Hopefully if improving further tomorrow, can advance diet. D/w'd family , here.    Patient Active Problem List   Diagnosis Code    HTN (hypertension) I10    SSS (sick sinus syndrome) ([facility] I49.5    PAF (paroxysmal atrial fibrillation) ([facility]) I48.0    Hypercholesterolemia E78.00    Osteoporosis M81.0    Rhinitis J31.0    Carotid stenosis, bilateral I65.23    GERD (gastroesophageal reflux disease) K21.9    Abdominal pain R10.9    Peripheral vascular disease ([facility]) I73.9    [facility]schemic colitis [facility] K55.9    Gastritis K29.70    Diverticulitis of colon (without mention of hemorrhage)(562.11) K57.32    Diarrhea R19.7    Colitis K52.9    Volume depletion, gastrointestinal loss E86.9    Abdominal pain, acute, generalized R10.84    Left sided colitis with rectal bleeding ([facility]) K51.511    Thrush, oral B37.0    Fever and chills R50.9    Acute respiratory failure with hypoxemia ([facility]) J96.01    Vasovagal near syncope R55    Non-sustained ventricular tachycardia ([facility]) I47.2    UTI (lower urinary tract infection) N39.0    Unsteady gait R26.81    VBI (vertebrobasilar insufficiency) G45.0    Labile essential hypertension I10    Functional gait abnormality R26.89    PVCs (premature ventricular contractions) I49.3    Syncope R55    Acute colitis K52.9          Plan:     As above.                                                        ___________________________________________________      Attending Physician: Tatiana Payne MD

## 2018-01-14 NOTE — ED NOTES
Dr. Timmy Germain, pt's previous PCP. Please call for any questions or concerns. MD stresses importance of continuous O2. Hospitalist made aware by family member.

## 2018-01-14 NOTE — H&P
[address] [facility]  ACUTE CARE HISTORY AND PHYSICAL    Jim URENA  MR#: [id number]  : 08/10/1926  ACCOUNT #: [de-identified]   DATE OF SERVICE: 2018    PRIMARY CARE PHYSICIAN:  Dr. Enzo Bamberger. PRESENTING COMPLAINT:  Abdominal pain. HISTORY OF PRESENT ILLNESS:  Patient is a 27-year-old female with a history of recurrent colitis who presented to the emergency room via EMS with complaints of abdominal pain. Abdominal pain was said to have started today around 2 p.m. Pain is located in the right lower quadrant. The pain is said to be constant, reported as dull pain, rated as 8/10 in severity. The patient reports history of recurrent colitis. The last time the patient had colitis, was about eight months ago. Patient had a normal bowel movement earlier on today. There is no report of constipation or diarrhea. The patient reported nausea and vomiting. Patient vomited once. Vomitus consisted of recently ingested food. There is no blood. There is no complaint of fever, chills, rigors. Patient has no dysuria, frequency, hematuria, urethral discharge or vaginal discharge. Pain is nonradiating. There is no report of any skin lesions or rash. The patient has no history of recent antibiotic use. According to the daughter and the granddaughter, patient does not respond well to antibiotics apart from Flagyl. There are reports that the patient does very well on Flagyl and oxygen supplementation. They also report the patient is usually treated with morphine and bowel rest.  They want the same management at this time. They do not want the patient to be on Cipro,  Levaquin or Zosyn or any other antibiotic aside from Flagyl. On presentation to the emergency room,  CT of the abdomen confirmed colonic thickening. The patient has no history of trauma or fall. She denied headache, lightheadedness, dizziness, blurry vision, double vision or syncopal episode or seizures.   There is no neck pain, neck stiffness or confusion. Patient has no chest pain, chest tightness, shortness of breath, dyspnea on exertion, PND, orthopnea. She denied diaphoresis, wheezing or rhonchi. She also denied lower extremity pain or swelling. There is a report of loss of appetite, but there is no dysphagia or odynophagia. There is also a complaint of generalized weakness, but no focal weakness, numbness, tingling sensation or abnormal sensation. PAST MEDICAL HISTORY:   1. Paroxysmal atrial fibrillation. 2. Sick sinus syndrome. 3.  Carotid stenosis, bilateral.  4.  Carotid stenosis, bilateral, right,  more than the left. 5.  Gastroesophageal reflux disease. 6.  Hypertension. 7.  Recurrent colitis. 8.  Hypercholesterolemia. 9.  Osteoporosis. 10.  Allergic rhinitis. PAST SURGICAL HISTORY:  1. Colonoscopy. 2.  Cholecystectomy. 3.  Hysterectomy. 4.  Stent left leg. 5.  Tonsillectomy. MEDICATIONS:  Amlodipine 5 mg daily, aspirin 81 mg daily, calcium with vitamin D one tablet daily, diazepam 2 mg every 6 hours as needed for anxiety. Doxazosin 1 mg half tablet at bedtime, flecainide 100 mg 2 times daily, fluticasone 2 sprays per nostril as needed for rhinitis. Folic acid one tablet daily, hydrocodone/acetaminophen 5/325 one tablet two times daily as needed for pain, lisinopril 20 mg daily, loratadine 10 mg daily as needed, lovastatin 20 mg daily, pantoprazole 40 mg daily and MiraLax 17 grams daily as needed. ALLERGIES:  BETA BLOCKERS, AMOXICILLIN, CODEINE,  EXELON, PLAVIX AND SULAR. SOCIAL HISTORY:  Patient is . She denies tobacco, alcohol or recreational drug use. FAMILY HISTORY:  Significant for hypertension in the mother. REVIEW OF SYSTEMS:  More than 12 systems reviewed and the pertinent positives as in the history of present illness, all others are negative. PHYSICAL EXAMINATION:  GENERAL:  Patient seen lying in bed in no acute respiratory distress.   She is on oxygen via nasal cannula. VITAL SIGNS:  Blood pressure 150/54, temperature 97.6, pulse 76, respirations 16, pulse ox 98%. HEAD, EARS, NOSE AND THROAT:  Atraumatic, normocephalic. Anicteric, not pale, not jaundiced. Extraocular muscles intact. Pupils bilaterally reactive, equal and round. NECK:  Supple, no JVD, no carotid bruit. HEART:  Sounds 1 and 2, regular rate and rhythm. No gallop, no friction. No rub. RESPIRATIONS:    Good air entry bilaterally. Clear to auscultation. ABDOMEN:  Soft. Tenderness noted in the right lower quadrant. No guarding, no rebound. Bowel sounds normoactive. NEUROLOGIC:  Alert, oriented x2. Cranial nerves II-XII intact. SKIN:  Warm, dry. Normal skin color. Normal skin turgor. PSYCHIATRIC:  Normal mood, normal affect. BACK:  No CVA tenderness. EXTREMITIES:  No edema, no cyanosis. Normal range of motion. DIAGNOSTIC TEST:   WBC 8.9, hemoglobin 12.3, hematocrit 38.1, platelets 470. Sodium 138, potassium 3.9, chloride 104, carbon dioxide 25, glucose 195, BUN 26, creatinine 1, calcium 9, ALT 21, AST 29, alkaline phosphatase 71, lipase 206, lactic acid 2.6. CT of the abdomen and pelvis showed persistent or recurrent descending colonic wall thickening. Patent mesenteric vessels at this time,  Persistent mild intrahepatic biliary duct dilatation in a patient with prior cholecystectomy. ASSESSMENT AND PLAN:    1. Abdominal pain. 2.  Acute colitis. 3.  Dehydration. 4.  Hypertension. 5.  Hypercholesterolemia. 6.  Paroxysmal atrial fibrillation. 7.  Sick sinus syndrome. 8.  History of recurrent ischemic colitis. 9.  Coronary artery disease. 10.  Bilateral carotid artery stenosis. 11.  Gastroesophageal reflux disease. 12.  Osteoporosis. PLAN:    1. Admit the patient to telemetry under the hospitalist service. 2.  IV fluid hydration. 3.  Monitor kidney function with hydration. 4.  Monitor electrolytes with hydration. 5.  IV antibiotics including Flagyl.     6. The family does not want the patient to receive any other antibiotic. 7.  Oxygen supplementation as per family request.  8.  Pain management, antiemetics. 9.  Stool workup as needed. 10.  Monitor vital signs including blood pressure as per unit protocol. 11.  Restart appropriate home medications. 12.  Fall precautions, skin care precautions, out of bed to chair with assistance. 13.  Deep venous thrombosis prophylaxis, heparin. 14.  I discussed advance directives with the patient, the daughter and the son are the next of kin. 15.  CODE:  FULL CODE. Further management will depend on patient's clinical progress and further evaluation by attending physician and results of test.  16.  Patient has a gastroenterologist who she follows up with as an outpatient.       MD SERENA Granados / JONES  D: 01/13/2018 22:19     T: 01/13/2018 23:24  JOB #: [id number]

## 2018-01-14 NOTE — ROUTINE PROCESS
TRANSFER - OUT REPORT:    Verbal report given to Alise RN(name) on [address]  being transferred to NSTU(unit) for routine progression of care       Report consisted of patients Situation, Background, Assessment and   Recommendations(SBAR). Information from the following report(s) SBAR, Kardex, ED Summary and MAR was reviewed with the receiving nurse. Lines:   Peripheral IV 01/13/18 Left Antecubital (Active)   Site Assessment Clean, dry, & intact 1/13/2018  7:52 PM   Phlebitis Assessment 0 1/13/2018  7:52 PM   Infiltration Assessment 0 1/13/2018  7:52 PM   Dressing Status Clean, dry, & intact 1/13/2018  7:52 PM   Dressing Type Transparent 1/13/2018  7:52 PM   Hub Color/Line Status Blue;Flushed;Patent 1/13/2018  7:52 PM   Action Taken Blood drawn 1/13/2018  7:52 PM        Opportunity for questions and clarification was provided.       Patient transported with:   [employer]

## 2018-01-14 NOTE — PROGRESS NOTES
Primary Nurse Melisa Haji RN and Bambi Petty RN performed a dual skin assessment on this patient No impairment noted  Rgoerio score is 20

## 2018-01-14 NOTE — CONSULTS
[address]    Jayne Bautitsa  MR#: [id number]  : 08/10/1926  ACCOUNT #: [de-identified]   DATE OF SERVICE: 2018    REASON FOR CONSULTATION:  Ischemic colitis. HISTORY:  I am asked to see the patient, who is a 35-year-old woman admitted with sudden onset of abdominal pain yesterday, similar to previous episodes of ischemic colitis. She had been doing well since her last episode of ischemic colitis in 2017 which prompted admission to [facility]. She has had numerous episodes in the past and has been seen at [facility] for this as well, and has seen Dr. Baron Ledesma in followup. She has been told to never have a colonoscopy again because her colon is too thin and redundant. She had pain located in the left lower quadrant, which is where this typically is, rated as 8-9/10 in severity yesterday, and associated with a syncopal episode, which oftentimes happens with the intense episodes of pain. She had no constipation or diarrhea, but did have multiple stools after presenting to the emergency room last night. There was associated nonbloody emesis last night in the emergency room. She also notes that there was no blood in the stool that she was aware of yesterday evening. She had no fevers or chills. Her colitis typically responds to oxygen supplementation, IV fluid repletion and Flagyl, and this regimen was instituted last night and the patient is already feeling significantly better. She has had recently no significant unexplained weight loss, and notes her bowel habits are usually fairly regular as long as she takes MiraLax regularly. She has had no chest pain, dyspnea, and no palpitations that she recalls.     PAST MEDICAL HISTORY:  Remarkable for paroxysmal atrial fibrillation, history of sick sinus syndrome, history of bilateral carotid stenosis worse on the right than the left, and history of recurrent ischemic colitis, hypertension, hyperlipidemia, osteoporosis, allergic rhinitis, and reflux. PAST SURGICAL HISTORY:  She has had colonoscopy, cholecystectomy, hysterectomy, left lower extremity stenting for peripheral vascular disease, history of tonsillectomy. ALLERGIES:  AMOXICILLIN, CODEINE, EXELON, PLAVIX, SULAR, AND BETA BLOCKERS. MEDICATIONS:  Amlodipine 5 mg daily, aspirin 81 mg daily, calcium with vitamin D one tablet daily, diazepam 2 mg every 6 hours p.r.n., doxazosin 1 mg at bedtime, flecainide 100 mg b.i.d., fluticasone 2 sprays per nostril p.r.n. rhinitis, folic acid 1 mg daily, hydrocodone/acetaminophen 5/325 one tablet 2 times daily p.r.n. pain, lisinopril 20 mg daily, loratadine 10 mg daily, lovastatin 20 mg daily, pantoprazole 40 mg daily, and MiraLax 17 grams daily p.r.n. SOCIAL HISTORY:  The patient is a  who does not smoke or drink alcohol. FAMILY HISTORY:  Remarkable for hypertension in her mother. REVIEW OF SYSTEMS:  No seizures. She did have an episode of syncope yesterday and has had several syncopal episodes in the past associated with the intense pain of her ischemic colitis episodes. She has had no palpitations, no chest pain. No cough or hemoptysis. No fevers or chills. She denies recent jaundice or dark urine. No dysuria or hematuria. No swollen joints, swollen lymph nodes. No easy bleeding or bruising. Otherwise, 10-point review of systems negative except as per HPI. PHYSICAL EXAMINATION:  GENERAL:  Reveals her to be a pleasant elderly woman who is alert, oriented x3, appearing somewhat younger than stated age with a pulse of 67, respiratory rate is 18. Blood pressure 174/64, temperature 98. HEENT:  Reveals no scleral icterus. NECK:  Supple, without JVD or bruit, no lymphadenopathy, no thyromegaly. CHEST:  Clear. HEART:  Regular rate and rhythm. ABDOMEN:  Soft, nondistended with minimal tenderness in the left lower quadrant, but no guarding or rebound.   No mass or organomegaly. RECTAL:  Deferred. EXTREMITIES:  Show no clubbing, cyanosis or edema. NEUROLOGIC:  Grossly intact and nonfocal.    LABORATORY DATA:  Pertinent for white count of 12.0, hemoglobin of 11.4, hematocrit 34.8, platelet count 746,751. BUN was 21 with a creatinine of 0.77. Her LFTs were normal on admission. Lactic acid was 2.6 last night; it is down to 0.8 today. CT scan done in the emergency room suggested mild intrahepatic biliary dilatation and evidence of prior cholecystectomy. She had some descending colon wall thickening, which was described as mild. Evidence of dextroconvex lumbar scoliosis with disk space narrowing at the lumbosacral junction was noted with some disk bulges in the lumbosacral spine. ASSESSMENT:  1.  Ischemic colitis in a patient who has had multiple episodes. She already seems to be improving significantly with the current regimen. 2.  History of hypertension. 3.  History of peripheral vascular disease. PLAN:  Would recommend the patient continue on the current regimen of IV fluid repletion, supplemental oxygen, and IV Flagyl, as she has had a good response to this and this has worked nicely for her in the past.  I do not have any plans of endoscopic evaluation at this point in light of her prompt response and her previously being warned against repeat colonoscopies. I will ask my group to follow up with her tomorrow and she will hopefully be able to tolerate a gradual advancement of her diet. Thanks very much for allowing us to see the patient. Alexandria Daniel.  MD RUPERT BolandB / VON  D: 01/14/2018 12:41     T: 01/14/2018 13:17  JOB #: [id number]  CC: Susana Benton MD  CC: Jai Herbert MD

## 2018-01-14 NOTE — CONSULTS
BRIEF GI CONSULT NOTE  PATIENT SEEN AND EXAMINED AND DISCUSSED WITH DR. REED.   IMP:  ISCHEMIC COLITIS  PLAN:  CONTINUE REGIMEN OF IV FLUID REPLETION, FLAGYL, AND O2 SUPPLEMENTATION WHICH HAS BEEN MOST EFFECTIVE FOR HER IN THE PAST  GROUP WILL FOLLOW UP IN THE AM

## 2018-01-14 NOTE — PROGRESS NOTES
Bedside and Verbal shift change report given to John Eduardo RN (oncoming nurse) by Ashli Saleh RN (offgoing nurse). Report included the following information SBAR, Kardex, Recent Results and Cardiac Rhythm NSR.

## 2018-01-15 VITALS
HEART RATE: 65 BPM | RESPIRATION RATE: 18 BRPM | SYSTOLIC BLOOD PRESSURE: 151 MMHG | DIASTOLIC BLOOD PRESSURE: 59 MMHG | WEIGHT: 133.8 LBS | BODY MASS INDEX: 21.5 KG/M2 | HEIGHT: 66 IN | TEMPERATURE: 97.6 F | OXYGEN SATURATION: 98 %

## 2018-01-15 LAB
ANION GAP SERPL CALC-SCNC: 7 MMOL/L (ref 5–15)
BASOPHILS # BLD: 0 K/UL (ref 0–0.1)
BASOPHILS NFR BLD: 0 % (ref 0–1)
BUN SERPL-MCNC: 13 MG/DL (ref 6–20)
BUN/CREAT SERPL: 22 (ref 12–20)
CALCIUM SERPL-MCNC: 8.1 MG/DL (ref 8.5–10.1)
CHLORIDE SERPL-SCNC: 110 MMOL/L (ref 97–108)
CO2 SERPL-SCNC: 24 MMOL/L (ref 21–32)
CREAT SERPL-MCNC: 0.58 MG/DL (ref 0.55–1.02)
EOSINOPHIL # BLD: 0.2 K/UL (ref 0–0.4)
EOSINOPHIL NFR BLD: 3 % (ref 0–7)
ERYTHROCYTE [DISTWIDTH] IN BLOOD BY AUTOMATED COUNT: 12.7 % (ref 11.5–14.5)
GLUCOSE SERPL-MCNC: 74 MG/DL (ref 65–100)
HCT VFR BLD AUTO: 32.4 % (ref 35–47)
HGB BLD-MCNC: 10.4 G/DL (ref 11.5–16)
LYMPHOCYTES # BLD: 2.1 K/UL (ref 0.8–3.5)
LYMPHOCYTES NFR BLD: 28 % (ref 12–49)
MCH RBC QN AUTO: 29.3 PG (ref 26–34)
MCHC RBC AUTO-ENTMCNC: 32.1 G/DL (ref 30–36.5)
MCV RBC AUTO: 91.3 FL (ref 80–99)
MONOCYTES # BLD: 0.6 K/UL (ref 0–1)
MONOCYTES NFR BLD: 8 % (ref 5–13)
NEUTS SEG # BLD: 4.4 K/UL (ref 1.8–8)
NEUTS SEG NFR BLD: 61 % (ref 32–75)
PLATELET # BLD AUTO: 185 K/UL (ref 150–400)
POTASSIUM SERPL-SCNC: 3.4 MMOL/L (ref 3.5–5.1)
RBC # BLD AUTO: 3.55 M/UL (ref 3.8–5.2)
SODIUM SERPL-SCNC: 141 MMOL/L (ref 136–145)
WBC # BLD AUTO: 7.3 K/UL (ref 3.6–11)

## 2018-01-15 PROCEDURE — 80048 BASIC METABOLIC PNL TOTAL CA: CPT | Performed by: INTERNAL MEDICINE

## 2018-01-15 PROCEDURE — 74011250637 HC RX REV CODE- 250/637: Performed by: INTERNAL MEDICINE

## 2018-01-15 PROCEDURE — 85025 COMPLETE CBC W/AUTO DIFF WBC: CPT | Performed by: INTERNAL MEDICINE

## 2018-01-15 PROCEDURE — 97161 PT EVAL LOW COMPLEX 20 MIN: CPT

## 2018-01-15 PROCEDURE — 97116 GAIT TRAINING THERAPY: CPT

## 2018-01-15 PROCEDURE — 74011000250 HC RX REV CODE- 250: Performed by: INTERNAL MEDICINE

## 2018-01-15 PROCEDURE — 36415 COLL VENOUS BLD VENIPUNCTURE: CPT | Performed by: INTERNAL MEDICINE

## 2018-01-15 RX ORDER — POTASSIUM CHLORIDE 750 MG/1
20 TABLET, FILM COATED, EXTENDED RELEASE ORAL
Status: COMPLETED | OUTPATIENT
Start: 2018-01-15 | End: 2018-01-15

## 2018-01-15 RX ORDER — METRONIDAZOLE 500 MG/1
500 TABLET ORAL 3 TIMES DAILY
Qty: 24 TAB | Refills: 0 | Status: SHIPPED | OUTPATIENT
Start: 2018-01-15 | End: 2018-03-19 | Stop reason: ALTCHOICE

## 2018-01-15 RX ORDER — AMLODIPINE BESYLATE 5 MG/1
2.5 TABLET ORAL
Status: DISCONTINUED | OUTPATIENT
Start: 2018-01-15 | End: 2018-01-15 | Stop reason: HOSPADM

## 2018-01-15 RX ADMIN — FLECAINIDE ACETATE 100 MG: 100 TABLET ORAL at 18:16

## 2018-01-15 RX ADMIN — PANTOPRAZOLE SODIUM 40 MG: 40 TABLET, DELAYED RELEASE ORAL at 08:36

## 2018-01-15 RX ADMIN — METRONIDAZOLE 500 MG: 500 INJECTION, SOLUTION INTRAVENOUS at 08:35

## 2018-01-15 RX ADMIN — METRONIDAZOLE 500 MG: 500 INJECTION, SOLUTION INTRAVENOUS at 15:43

## 2018-01-15 RX ADMIN — LOVASTATIN 20 MG: 20 TABLET ORAL at 08:36

## 2018-01-15 RX ADMIN — Medication 10 ML: at 06:06

## 2018-01-15 RX ADMIN — FLECAINIDE ACETATE 100 MG: 100 TABLET ORAL at 08:35

## 2018-01-15 RX ADMIN — ASPIRIN 81 MG: 81 TABLET, COATED ORAL at 08:36

## 2018-01-15 RX ADMIN — METRONIDAZOLE 500 MG: 500 INJECTION, SOLUTION INTRAVENOUS at 00:24

## 2018-01-15 RX ADMIN — POTASSIUM CHLORIDE 20 MEQ: 750 TABLET, FILM COATED, EXTENDED RELEASE ORAL at 08:35

## 2018-01-15 RX ADMIN — LISINOPRIL 20 MG: 20 TABLET ORAL at 08:36

## 2018-01-15 RX ADMIN — THERA TABS 1 TABLET: TAB at 08:36

## 2018-01-15 RX ADMIN — CALCIUM CARBONATE-VITAMIN D TAB 500 MG-200 UNIT 1 TABLET: 500-200 TAB at 08:35

## 2018-01-15 RX ADMIN — AMLODIPINE BESYLATE 2.5 MG: 5 TABLET ORAL at 06:13

## 2018-01-15 NOTE — INTERDISCIPLINARY ROUNDS
IDR/SLIDR Summary          Patient: Bryan Pardo MRN: [id number]    Age: 80 y.o. YOB: 1926 Room/Bed: [facility]   Admit Diagnosis: Acute colitis  Principal Diagnosis: Acute colitis   Goals: increase mobility, abx, IVF  Readmission: NO  Quality Measure: Not applicable  VTE Prophylaxis: Mechanical  Influenza Vaccine screening completed? YES  Pneumococcal Vaccine screening completed? YES  Mobility needs: Yes   Nutrition plan:Yes  Consults:P.T, O.T. and Case Management    Financial concerns:No  Escalated to CM? NO  RRAT Score: 18   Interventions: n/a  Testing due for pt today?  NO  LOS: 2 days Expected length of stay 3 days  Discharge plan: home   PCP: Apple Flores MD  Transportation needs: No    Days before discharge:one day until discharge   Discharge disposition: Home    Signed:     Marleen Olivia RN  1/15/2018  1:12 AM

## 2018-01-15 NOTE — PROGRESS NOTES
Problem: Falls - Risk of  Goal: *Absence of Falls  Document Thania Fall Risk and appropriate interventions in the flowsheet.    Outcome: Progressing Towards Goal  Fall Risk Interventions:  Mobility Interventions: Assess mobility with egress test, OT consult for ADLs, Patient to call before getting OOB, PT Consult for mobility concerns, PT Consult for assist device competence         Medication Interventions: Assess postural VS orthostatic hypotension, Patient to call before getting OOB, Teach patient to arise slowly    Elimination Interventions: Call light in reach, Toileting schedule/hourly rounds, Patient to call for help with toileting needs

## 2018-01-15 NOTE — PROGRESS NOTES
Bedside shift change report given to [address] (oncoming nurse) by Mi Montaño (offgoing nurse). Report included the following information SBAR, Kardex, Intake/Output, MAR and Recent Results.

## 2018-01-15 NOTE — CDMP QUERY
Dear Dr. Jabari Gómez,    Please clarify if this patient is being treated/managed for:    =>Lactic acidosis in the setting of ischemic colitis requiring IVF and lab monitoring  =>Other Explanation of clinical findings  =>Unable to Determine (no explanation of clinical findings)    The medical record reflects the following clinical findings, treatment, and risk factors:    Risk Factors: 89yo cc LLQ abdominal pain and vomiting  Clinical Indicators: lactic acid 2.6, noted ischemic colitis  Treatment: NS bolus and continuous infusion and lab monitoring    Please clarify and document your clinical opinion in the progress notes and discharge summary including the definitive and/or presumptive diagnosis, (suspected or probable), related to the above clinical findings. Please include clinical findings supporting your diagnosis.     Thank You  Kourtney Cedeño,MSN,BSN,RN,[facility]  [phone]

## 2018-01-15 NOTE — PROGRESS NOTES
Bedside shift change report given to Kayode Dickens RN (oncoming nurse) by Mary Xavier (offgoing nurse). Report included the following information SBAR, Kardex, Intake/Output, MAR and Recent Results.

## 2018-01-15 NOTE — PROGRESS NOTES
Problem: Discharge Planning  Goal: *Discharge to safe environment  Outcome: Progressing Towards Goal  Home with medical follow up

## 2018-01-15 NOTE — DISCHARGE INSTRUCTIONS
Patient Discharge Instructions    Stu Obando / [id number] : 8/10/1926    Admitted 2018 Discharged: 1/15/2018     Take Home Medications          · It is important that you take the medication exactly as they are prescribed. · Keep your medication in the bottles provided by the pharmacist and keep a list of the medication names, dosages, and times to be taken in your wallet. · Do not take other medications without consulting your doctor. What to do at Home    Recommended diet: soft, bland diet     Recommended activity: gradually increase     If you experience any of the following symptoms : abdominal pain, dizziness , please follow up with Dr. Howard Ovalle at [phone]     Follow-up with Dr. Nichole Tariq ([phone]) on  or 18 . Call for an appointment         Information obtained by :  I understand that if any problems occur once I am at home I am to contact my physician. I understand and acknowledge receipt of the instructions indicated above.                                                                                                                                            Physician's or R.N.'s Signature                                                                  Date/Time                                                                                                                                              Patient or Representative Signature                                                          Date/Time

## 2018-01-15 NOTE — PROGRESS NOTES
Care Management Interventions  PCP Verified by CM: Yes (following in hospital)  Last Visit to PCP: 12/11/17  Mode of Transport at Discharge:  (car with family)  Transition of Care Consult (CM Consult): Discharge Planning  Discharge Durable Medical Equipment: No  Physical Therapy Consult: Yes  Occupational Therapy Consult: No  Speech Therapy Consult: No  Current Support Network: Lives Alone (lives alone in her own one level home. Self care and indepedent prior to admission   good family support  no AMD in chart)  Confirm Follow Up Transport: Self (family)  Plan discussed with Pt/Family/Caregiver: Yes  Freedom of Choice Offered: Yes  Discharge Location  Discharge Placement: Home     Cm met with patient in her room to introduce self and explain role. Patient was alert and oriented and confirmed the above-- She said she lives in her own one level home alone and was self care and independent prior to admission. Does continue to drive and enjoys going to [employer]. She has 2 adult children--daughter Sam Dillon [phone] and son Ashli Medrano [phone] and granddaughter, Jez Elise [phone]. Neo Huerta is a nurse and patient said Neo Huerta will transport her home when discharged and stay the night with her. Patient was admitted with acute colitis and is feeling better. She hopes to be discharged later today. She has medical hx of a-fib, carotid stenosis, hypertension, recurrent colitis and osteoporosis. Dr Pavithra Miranda is her PCP and he is following her in the hospital.  Patient has used [facility] in the past and would use the agency again if [facility] is needed when discharged. Patient has Medicare and [employer] insurance. She is a retired . She has no concerns in securing medications. Secures medications from [employer] and mail order. No transition of care needs identified at this time. Family to transport home. CM will follow incase needs arise.

## 2018-01-15 NOTE — PROGRESS NOTES
[address]  [address] [facility], [address]       GI PROGRESS NOTE  Will Severo Croak  [phone] office  [phone] NP/PA in-hospital cell phone M-F until 4:30PM  After 5PM or on weekends, please call  for physician on call      NAME: Pablo Galicia   :  8/10/1926   MRN:  [id number]       Subjective:   Patient is sitting in the chair and appears comfortable. She reports improvement in her abdominal pain. She notes some tenderness. No nausea or vomiting. She tolerated lunch without difficulty. Objective:     VITALS:   Last 24hrs VS reviewed since prior progress note. Most recent are:  Visit Vitals    /66 (BP 1 Location: Left arm, BP Patient Position: At rest;Sitting)    Pulse 73    Temp 97.6 °F (36.4 °C)    Resp 18    Ht 5' 6\" (1.676 m)    Wt 60.7 kg (133 lb 12.8 oz)    SpO2 98%    BMI 21.6 kg/m2       PHYSICAL EXAM:  General: Cooperative, no acute distress    Neurologic:  Alert and oriented X 3. HEENT: EOMI, no scleral icterus   Lungs:  CTA bilaterally anteriorly  Heart:  S1 S2, regular rate and rhythm  Abdomen: Soft, non-distended, lower abdominal tenderness, no guarding, no rebound. +Bowel sounds. Extremities: Warm  Psych:   Good insight. Not anxious or agitated.     Lab Data Reviewed:     Recent Results (from the past 24 hour(s))   METABOLIC PANEL, BASIC    Collection Time: 01/15/18  4:01 AM   Result Value Ref Range    Sodium 141 136 - 145 mmol/L    Potassium 3.4 (L) 3.5 - 5.1 mmol/L    Chloride 110 (H) 97 - 108 mmol/L    CO2 24 21 - 32 mmol/L    Anion gap 7 5 - 15 mmol/L    Glucose 74 65 - 100 mg/dL    BUN 13 6 - 20 MG/DL    Creatinine 0.58 0.55 - 1.02 MG/DL    BUN/Creatinine ratio 22 (H) 12 - 20      GFR est AA >60 >60 ml/min/1.73m2    GFR est non-AA >60 >60 ml/min/1.73m2    Calcium 8.1 (L) 8.5 - 10.1 MG/DL   CBC WITH AUTOMATED DIFF    Collection Time: 01/15/18  4:01 AM   Result Value Ref Range    WBC 7.3 3.6 - 11.0 K/uL    RBC 3.55 (L) 3.80 - 5.20 M/uL    HGB 10.4 (L) 11.5 - 16.0 g/dL    HCT 32.4 (L) 35.0 - 47.0 %    MCV 91.3 80.0 - 99.0 FL    MCH 29.3 26.0 - 34.0 PG    MCHC 32.1 30.0 - 36.5 g/dL    RDW 12.7 11.5 - 14.5 %    PLATELET 345 751 - 835 K/uL    NEUTROPHILS 61 32 - 75 %    LYMPHOCYTES 28 12 - 49 %    MONOCYTES 8 5 - 13 %    EOSINOPHILS 3 0 - 7 %    BASOPHILS 0 0 - 1 %    ABS. NEUTROPHILS 4.4 1.8 - 8.0 K/UL    ABS. LYMPHOCYTES 2.1 0.8 - 3.5 K/UL    ABS. MONOCYTES 0.6 0.0 - 1.0 K/UL    ABS. EOSINOPHILS 0.2 0.0 - 0.4 K/UL    ABS. BASOPHILS 0.0 0.0 - 0.1 K/UL     Assessment:   · Ischemic colitis: multiple episodes in the past. Patient has seen significant improvement in her symptoms.       Patient Active Problem List   Diagnosis Code    HTN (hypertension) I10    SSS (sick sinus syndrome) ([facility]) I49.5    PAF (paroxysmal atrial fibrillation) ([facility]) I48.0    Hypercholesterolemia E78.00    Osteoporosis M81.0    Rhinitis J31.0    Carotid stenosis, bilateral I65.23    GERD (gastroesophageal reflux disease) K21.9    Abdominal pain R10.9    Peripheral vascular disease ([facility]) I73.9    Ischemic colitis ([facility] K55.9    Gastritis K29.70    Diverticulitis of colon (without mention of hemorrhage)(562.11) K57.32    Diarrhea R19.7    Colitis K52.9    Volume depletion, gastrointestinal loss E86.9    Abdominal pain, acute, generalized R10.84    Left sided colitis with rectal bleeding ([facility]) K51.511    Thrush, oral B37.0    Fever and chills R50.9    Acute respiratory failure with hypoxemia ([facility]) J96.01    Vasovagal near syncope R55    Non-sustained ventricular tachycardia ([facility]) I47.2    UTI (lower urinary tract infection) N39.0    Unsteady gait R26.81    VBI (vertebrobasilar insufficiency) G45.0    Labile essential hypertension I10    Functional gait abnormality R26.89    PVCs (premature ventricular contractions) I49.3    Syncope R55    Acute colitis K52.9     Plan:   · Diet as tolerated  · Continue supportive measures: IVF  · Continue antibiotics Signed By: Lakeisha Watkins, [address]     1/15/2018  4:06 PM       GI Attending: Patient seen and examined. Continue antibiotics, IVF, oxygen for management of presumptive ischemic colitis. Stable for discharge from GI perspective. Please call with any questions. Nikos Mead MD

## 2018-01-15 NOTE — PROGRESS NOTES
I saw her this evening and examined her. She is anxious for d/c and appears stable for d/c  I reviewed instructions w/ her and her dtr, here.

## 2018-01-15 NOTE — PROGRESS NOTES
Bedside and Verbal shift change report given to julio (oncoming nurse) by Leticia Carlson (offgoing nurse). Report included the following information SBAR, Kardex, ED Summary, Procedure Summary, Intake/Output, MAR, Recent Results, Med Rec Status and Cardiac Rhythm NSR/SB.

## 2018-01-15 NOTE — PROGRESS NOTES
physical Therapy EVALUATION/DISCHARGE  Patient: Gabriela Blount (80 y.o. female)  Date: 1/15/2018  Primary Diagnosis: Acute colitis        Precautions: Fall     ASSESSMENT :  Based on the objective data described below, the patient presents with independence with mobility consistent with her baseline compared to prior to admission. She is independent with mobility and does not utilize DME for balance. Lives alone. Children present and have no safety concerns at this time. Recommend d.c home when medically cleared. Skilled physical therapy is not indicated at this time. PLAN :  Discharge Recommendations: None  Further Equipment Recommendations for Discharge: none     SUBJECTIVE:   Patient stated I try not to use a cane unless I can help it.     OBJECTIVE DATA SUMMARY:   HISTORY:    Past Medical History:   Diagnosis Date    Acute ischemic colitis ([facility]     Arrhythmia     Paroxysmal  Afib,  SSS    Carotid stenosis, bilateral     moderate , right >left    GERD (gastroesophageal reflux disease) 12/10/2011    HTN (hypertension) 12/8/2011    HX OTHER MEDICAL 2011    cardiac cath ;non obstructive CAD. Renal artery stents; open    HX OTHER MEDICAL 2011    LLQ abd. pain thought due to spasm.     Hypercholesterolemia     Hypertension     Osteoporosis     Osteoporosis 12/8/2011    PAF (paroxysmal atrial fibrillation) ([address] 12/8/2011    PVD (peripheral vascular disease) ([address]     Rhinitis 12/8/2011    SSS (sick sinus syndrome) ([facility] 12/8/2011     Past Surgical History:   Procedure Laterality Date    ENDOSCOPY, COLON, DIAGNOSTIC  3/2/11    diverticulosis,hemorrhoids    HX CHOLECYSTECTOMY      HX HYSTERECTOMY      1960    HX OTHER SURGICAL      stent left leg    HX TONSILLECTOMY       Prior Level of Function/Home Situation: independent   Personal factors and/or comorbidities impacting plan of care:     Home Situation  Home Environment: Private residence  # Steps to Enter: 0  One/Two Story Residence: One story  Living Alone: Yes  Support Systems: Child(myriam), Family member(s)  Patient Expects to be Discharged to[de-identified] Private residence  Current DME Used/Available at Home: [employer]eding, straight  Tub or Shower Type: Shower    EXAMINATION/PRESENTATION/DECISION MAKING:   Critical Behavior:  Neurologic State: Appropriate for age  Orientation Level: Oriented X4  Cognition: Appropriate for age attention/concentration     Hearing: Auditory  Auditory Impairment: None    Range Of Motion:  AROM: Within functional limits           PROM: Within functional limits           Strength:    Strength: Within functional limits                    Tone & Sensation:   Tone: Normal              Sensation: Intact               Coordination:  Coordination: Within functional limits  Vision:      Functional Mobility:  Bed Mobility:              Transfers:  Sit to Stand: Independent  Stand to Sit: Independent  Stand Pivot Transfers: Independent                    Balance:   Sitting: Intact  Standing: Intact  Ambulation/Gait Training:              Gait Description (WDL): Within defined limits       Functional Measure:  Tinetti test:    Sitting Balance: 1  Arises: 2  Attempts to Rise: 2  Immediate Standing Balance: 2  Standing Balance: 2  Nudged: 2  Eyes Closed: 1  Turn 360 Degrees - Continuous/Discontinuous: 1  Turn 360 Degrees - Steady/Unsteady: 1  Sitting Down: 2  Balance Score: 16  Indication of Gait: 1  R Step Length/Height: 1  L Step Length/Height: 1  R Foot Clearance: 1  L Foot Clearance: 1  Step Symmetry: 1  Step Continuity: 1  Path: 2  Trunk: 2  Walking Time: 1  Gait Score: 12  Total Score: 28       Tinetti Test and G-code impairment scale:  Percentage of Impairment CH    0%   CI    1-19% CJ    20-39% CK    40-59% CL    60-79% CM    80-99% CN     100%   Tinetti  Score 0-28 28 23-27 17-22 12-16 6-11 1-5 0       Tinetti Tool Score Risk of Falls  <19 = High Fall Risk  19-24 = Moderate Fall Risk  25-28 = Low Fall Risk  Tinetti ME.  Performance-Oriented Assessment of Mobility Problems in Elderly Patients. [facility] 66; [id number]. (Scoring Description: PT Bulletin Feb. 10, 1993)    Older adults: Nolan Vega et al, 2009; n = 1000 [facility] elderly evaluated with ABC, COMFORT, ADL, and IADL)  · Mean COMFORT score for males aged 69-68 years = 26.21(3.40)  · Mean COMFORT score for females age 69-68 years = 25.16(4.30)  · Mean COMFORT score for males over 80 years = 23.29(6.02)  · Mean COMFORT score for females over 80 years = 17.20(8.32)         G codes: In compliance with CMSs Claims Based Outcome Reporting, the following G-code set was chosen for this patient based on their primary functional limitation being treated: The outcome measure chosen to determine the severity of the functional limitation was the Tinetti with a score of 28/28 which was correlated with the impairment scale. ? Mobility - Walking and Moving Around:     - CURRENT STATUS: CH - 0% impaired, limited or restricted    - GOAL STATUS: CH - 0% impaired, limited or restricted    - D/C STATUS:  CH - 0% impaired, limited or restricted        Physical Therapy Evaluation Charge Determination   History Examination Presentation Decision-Making   MEDIUM  Complexity : 1-2 comorbidities / personal factors will impact the outcome/ POC  LOW Complexity : 1-2 Standardized tests and measures addressing body structure, function, activity limitation and / or participation in recreation  LOW Complexity : Stable, uncomplicated  LOW Complexity : FOTO score of       Based on the above components, the patient evaluation is determined to be of the following complexity level: LOW     Pain:  Pain Scale 1: Numeric (0 - 10)  Pain Intensity 1: 0     Activity Tolerance:   VSS  Please refer to the flowsheet for vital signs taken during this treatment.   After treatment:   [x]   Patient left in no apparent distress sitting up in chair  []   Patient left in no apparent distress in bed  [x]   Call bell left within reach  [x]   Nursing notified  [x]   Caregiver present  []   Bed alarm activated    COMMUNICATION/EDUCATION:   Communication/Collaboration:  [x]   Fall prevention education was provided and the patient/caregiver indicated understanding. [x]   Patient/family have participated as able and agree with findings and recommendations. []   Patient is unable to participate in plan of care at this time.   Findings and recommendations were discussed with: Occupational Therapist and Registered Nurse    Thank you for this referral.  Saira Barton, PT   Time Calculation: 25 mins

## 2018-01-16 NOTE — PROGRESS NOTES
I have reviewed discharge instructions with the patient. The patient verbalized understanding. Patient had no questions or concerns about medications for discharge instructions.   ANJANA Go walked patient down to her ride in the discharge area

## 2018-01-17 NOTE — DISCHARGE SUMMARY
[address] [facility]  MR#: [id number]  : 08/10/1926  ACCOUNT #: [de-identified]   ADMIT DATE: 2018  DISCHARGE DATE: 01/15/2018    DISCHARGE DIAGNOSES:  Ischemic colitis, lactic acidosis in the setting of ischemic colitis requiring IV fluid and lab monitoring, hypertension, and peripheral vascular disease. Syncope     DISCHARGE INSTRUCTIONS:  Flagyl 500 mg p.o. t.i.d. x8 days. Otherwise, resume usual home medications. Soft diet. Gradually increase activity as tolerated. Call Dr. Leatha Ferrer for increased abdominal pain or dizziness. Otherwise, follow up with him in 8-9 days in the office. CHIEF COMPLAINT:  A 49-year-old community dwelling white female admitted with abdominal pain. HISTORY OF PRESENT ILLNESS:  She has a history of recurrent ischemic colitis requiring hospitalization. She presented to the emergency room this time with abdominal pain which started on the day of admission approximately 6 hours prior to hospitalization. Pain is in the right lower quadrant, constant, dull, 8/10 in severity. Her last previous episode of colitis was approximately 8 months ago. Early on the day of admission, she had passed a normal bowel movement. No report of constipation or diarrhea. She did report some nausea and vomiting with vomiting x1 with recently ingested food. No blood. No complaints of fever, chills, or rigors. Patient reports no dysuria, frequency, hematuria, urethral discharge, or vaginal discharge. Her pain is nonradiating. Historically her episodes of ischemic colitis have responded well to oxygen supplementation and Flagyl, but not other antibiotics, specifically the family wished that she not be on Cipro, Levaquin, or Zosyn. She has seen a gastroenterologist, Dr. Mary Panda, in the past and has been advised that due to her thin and redundant colon that she not have another colonoscopy.   She had an episode of probable convulsive syncope with the abdominal pain (abdominal pain prior to admission). She often has syncope with these episodes of intense abdominal pain. She presented to the emergency room and after presenting there did have multiple stools. She has had recently no significant unexplained weight loss. Her bowel habits are usually fairly regular as long as she takes MiraLax. In the ER, a CT scan of the abdomen revealed colonic thickening. There was no known trauma or fall. She denied headache, lightheadedness, dizziness, blurred vision, or double vision. No neck vein stiffness or confusion. No chest pain, chest tightness, dyspnea, dyspnea on exertion, PND, orthopnea, diaphoresis, wheezing, or rhonchi, lower extremity pain or swelling. She does report loss of appetite, but there is no dysphagia or odynophagia. She has generalized weakness, but no focal weakness, numbness, tingling sensation, or abnormal sensation. PAST MEDICAL HISTORY:  Recurrent ischemic colitis as mentioned, paroxysmal atrial fibrillation, sick sinus syndrome, carotid stenosis bilaterally, right more than left, GERD, hypertension, hypercholesterolemia, osteoporosis, and allergic rhinitis. PAST SURGICAL HISTORY:  Colonoscopy, cholecystectomy, hysterectomy, stenting in the left leg, and tonsillectomy. MEDICATIONS:  Amlodipine 5 mg daily, ASA 81 mg daily, calcium with vitamin D 1 tab daily, diazepam 2 mg q.6h. p.r.n. anxiety, doxazosin 0.5 mg at bedtime, flecainide 100 mg b.i.d., fluticasone 2 sprays each nostril as needed for rhinitis, folic acid 1 mg daily, hydrocodone 5 mg/acetaminophen 325 mg 1 tablet b.i.d. p.r.n. pain, lisinopril 20 mg daily, loratadine 10 mg as needed, lovastatin 20 mg at bedtime, pantoprazole 40 mg daily, and MiraLax 17 grams daily as needed. ALLERGIES:  BETA BLOCKER, AMOXICILLIN, CODEINE, EXELON, PLAVIX, AND SULAR. SOCIAL HISTORY:  . Denies tobacco, alcohol, or recreational drug use.     FAMILY HISTORY:  Mother had hypertension. REVIEW OF SYSTEMS:  Except for the pertinent positives listed in the history of present illness, otherwise her review of systems is negative. PHYSICAL EXAMINATION:  GENERAL:  In no acute distress, on O2 nasal cannula. VITAL SIGNS:  /54, temperature 97.6, pulse 76, RR 16, pulse ox 98%. HEENT:  Negative. HEART:  S1, S2, regular rate and rhythm. No gallop, no friction, no rub. LUNGS:  Respirations clear and air entry bilaterally, clear to auscultation. ABDOMEN:  Soft. Tenderness in the right lower quadrant. No guarding, no rebound. Bowel sounds normoactive. NEUROLOGIC:  Alert and oriented x2. Cranial nerves II-XII intact. BACK:  No CVA tenderness. EXTREMITIES:  No significant edema. LABORATORY DATA:  WBC 8.9, hemoglobin 12.3, hematocrit 38.1, platelets 408. CO2 25, glucose 195, BUN 26, creatinine 1, calcium 9, ALT 21, lipase 206. Lactic acid 2.6. CT of the abdomen and pelvis:  Persistent recurrent descending colon wall thickening. Patent mesenteric vessels at this time. Persistent mild intrahepatic biliary duct dilatation in a patient with prior cholecystectomy. HOSPITAL COURSE:  The patient was admitted by the hospitalist and placed on IV Flagyl, IV fluids, telemetry, and supplemental O2. She had pain analgesia and antiemetics p.r.n. written. SHE WAS FULL CODE STATUS. She was transferred on 01/14/2018 to  service. She was seen in consultation by Malika Villarreal MD, gastroenterologist, who agreed with her management. Patient's diet was advanced to clear liquids and subsequently to a soft diet, which she tolerated well. Her abdominal pain greatly improved and she was stable for discharge on 01/15/2018 to have followup as above. DISPOSITION:  Home.       MD MAYRA Hernandez/JONES  D: 01/16/2018 17:58     T: 01/16/2018 22:46  JOB #: [id number]  CC: Nicci Murillo MD  CC: Crystal Agarwal MD

## 2018-03-16 ENCOUNTER — APPOINTMENT (OUTPATIENT)
Dept: GENERAL RADIOLOGY | Age: 83
End: 2018-03-16
Attending: NURSE PRACTITIONER
Payer: MEDICARE

## 2018-03-16 ENCOUNTER — HOSPITAL ENCOUNTER (EMERGENCY)
Age: 83
Discharge: HOME | End: 2018-03-16
Attending: EMERGENCY MEDICINE
Payer: MEDICARE

## 2018-03-16 VITALS
HEART RATE: 96 BPM | OXYGEN SATURATION: 98 % | TEMPERATURE: 98.2 F | RESPIRATION RATE: 16 BRPM | DIASTOLIC BLOOD PRESSURE: 63 MMHG | SYSTOLIC BLOOD PRESSURE: 192 MMHG

## 2018-03-16 DIAGNOSIS — M79.605 LEFT LEG PAIN: Primary | ICD-10-CM

## 2018-03-16 DIAGNOSIS — I10 HYPERTENSION, UNSPECIFIED TYPE: ICD-10-CM

## 2018-03-16 LAB
BASOPHILS # BLD: 0.1 K/UL (ref 0–0.1)
BASOPHILS NFR BLD: 1 % (ref 0–1)
D DIMER PPP FEU-MCNC: 0.48 MG/L FEU (ref 0–0.65)
DIFFERENTIAL METHOD BLD: NORMAL
EOSINOPHIL # BLD: 0.2 K/UL (ref 0–0.4)
EOSINOPHIL NFR BLD: 3 % (ref 0–7)
ERYTHROCYTE [DISTWIDTH] IN BLOOD BY AUTOMATED COUNT: 12.4 % (ref 11.5–14.5)
HCT VFR BLD AUTO: 40.2 % (ref 35–47)
HGB BLD-MCNC: 13.4 G/DL (ref 11.5–16)
IMM GRANULOCYTES # BLD: 0 K/UL (ref 0–0.04)
IMM GRANULOCYTES NFR BLD AUTO: 0 % (ref 0–0.5)
LYMPHOCYTES # BLD: 2.1 K/UL (ref 0.8–3.5)
LYMPHOCYTES NFR BLD: 31 % (ref 12–49)
MCH RBC QN AUTO: 30.8 PG (ref 26–34)
MCHC RBC AUTO-ENTMCNC: 33.3 G/DL (ref 30–36.5)
MCV RBC AUTO: 92.4 FL (ref 80–99)
MONOCYTES # BLD: 0.8 K/UL (ref 0–1)
MONOCYTES NFR BLD: 11 % (ref 5–13)
NEUTS SEG # BLD: 3.7 K/UL (ref 1.8–8)
NEUTS SEG NFR BLD: 54 % (ref 32–75)
NRBC # BLD: 0 K/UL (ref 0–0.01)
NRBC BLD-RTO: 0 PER 100 WBC
PLATELET # BLD AUTO: 203 K/UL (ref 150–400)
PMV BLD AUTO: 10.6 FL (ref 8.9–12.9)
RBC # BLD AUTO: 4.35 M/UL (ref 3.8–5.2)
WBC # BLD AUTO: 6.8 K/UL (ref 3.6–11)

## 2018-03-16 PROCEDURE — 93971 EXTREMITY STUDY: CPT

## 2018-03-16 PROCEDURE — 85379 FIBRIN DEGRADATION QUANT: CPT | Performed by: NURSE PRACTITIONER

## 2018-03-16 PROCEDURE — 99282 EMERGENCY DEPT VISIT SF MDM: CPT

## 2018-03-16 PROCEDURE — 73590 X-RAY EXAM OF LOWER LEG: CPT

## 2018-03-16 PROCEDURE — 36415 COLL VENOUS BLD VENIPUNCTURE: CPT | Performed by: NURSE PRACTITIONER

## 2018-03-16 PROCEDURE — 85025 COMPLETE CBC W/AUTO DIFF WBC: CPT | Performed by: NURSE PRACTITIONER

## 2018-03-17 NOTE — PROCEDURES
[address]  *** FINAL REPORT ***    Name: Cyndee Godinez  MRN: [id number]  : 10 Aug 1926  HIS Order #: [id number]  [address] [facility] Visit #: [id number]  Date: 16 Mar 2018    TYPE OF TEST: Peripheral Venous Testing    REASON FOR TEST  Pain in limb    Left Leg:-  Deep venous thrombosis:           No  Superficial venous thrombosis:    No  Deep venous insufficiency:        Not examined  Superficial venous insufficiency: Not examined      INTERPRETATION/FINDINGS  PROCEDURE:  Color duplex ultrasound imaging of lower extremity veins. FINDINGS:       Right: The common femoral vein is patent and without evidence of  thrombus. Phasic flow is observed. This extremity was not otherwise  evaluated. Left:   The common femoral, deep femoral, femoral, popliteal,  posterior tibial, peroneal, and great saphenous are patent and without   evidence of thrombus;  each is fully compressible and there is no  narrowing of the flow channel on color Doppler imaging. Phasic flow  is observed in the common femoral vein. IMPRESSION:  No evidence of left lower extremity vein thrombosis. ADDITIONAL COMMENTS    I have personally reviewed the data relevant to the interpretation of  this  study.     TECHNOLOGIST: Binu Anthony RVT  Signed: 2018 08:36 PM    PHYSICIAN: Susannah Carmen MD  Signed: 2018 08:27 AM

## 2018-03-17 NOTE — ED PROVIDER NOTES
HPI       80y F here with LLE pain x 1 week. Also with elevated BP off and on for the past week. Instructed to take additional meds per PMD when BP high, and this seemed to help some but then BP back up on arrival here. Pain in leg is more L lateral. No nausea or vomiting. No trouble breathing. No chest pain. No injury or trauma. No recent travel. Occ the leg will swell up and go down (as will the other leg) but nothing that stays consistently swollen. Past Medical History:   Diagnosis Date    Acute ischemic colitis ([facility] [address]     Arrhythmia     Paroxysmal  Afib,  SSS    Carotid stenosis, bilateral     moderate , right >left    GERD (gastroesophageal reflux disease) 12/10/2011    HTN (hypertension) 12/8/2011    HX OTHER MEDICAL 2011    cardiac cath ;non obstructive CAD. Renal artery stents; open    HX OTHER MEDICAL 2011    LLQ abd. pain thought due to spasm.  Hypercholesterolemia     Hypertension     Osteoporosis     Osteoporosis 12/8/2011    PAF (paroxysmal atrial fibrillation) ([facility] [address] 12/8/2011    PVD (peripheral vascular disease) ([facility] [address]     Rhinitis 12/8/2011    SSS (sick sinus syndrome) ([facility] [address] 12/8/2011       Past Surgical History:   Procedure Laterality Date    ENDOSCOPY, COLON, DIAGNOSTIC  3/2/11    diverticulosis,hemorrhoids    HX CHOLECYSTECTOMY      HX HYSTERECTOMY      1960    HX OTHER SURGICAL      stent left leg    HX TONSILLECTOMY           Family History:   Problem Relation Age of Onset    Hypertension Mother        Social History     Social History    Marital status:      Spouse name: N/A    Number of children: N/A    Years of education: N/A     Occupational History    Not on file.      Social History Main Topics    Smoking status: Never Smoker    Smokeless tobacco: Never Used    Alcohol use No    Drug use: No    Sexual activity: Not on file     Other Topics Concern    Not on file     Social History Narrative         ALLERGIES: Beta blocker [beta-blockers (beta-adrenergic blocking agts)]; Amoxicillin; Codeine; Donnatal [phenobarb-hyoscy-atropine-scop]; Exelon [rivastigmine]; Other medication; Plavix [clopidogrel]; and Sular [nisoldipine]    Review of Systems   Review of Systems   Constitutional: (-) weight loss. HEENT: (-) stiff neck   Eyes: (-) discharge. Respiratory: (-) for cough. Cardiovascular: (-) syncope. Gastrointestinal: (-) blood in stool. Genitourinary: (-) hematuria. Musculoskeletal: (-) myalgias. Neurological: (-) seizure. Skin: (-) petechiae  Lymph/Immunologic: (-) enlarged lymph nodes  All other systems reviewed and are negative. Vitals:    03/16/18 1950   BP: 191/64   Pulse: 69   Resp: 16   Temp: 98.2 °F (36.8 °C)   SpO2: 100%            Physical Exam Nursing note and vitals reviewed. Constitutional: oriented to person, place, and time. appears well-developed and well-nourished. No distress. Head: Normocephalic and atraumatic. Sclera anicteric  Nose: No rhinorrhea  Mouth/Throat: Oropharynx is clear and moist. Pharynx normal  Eyes: Conjunctivae are normal. Pupils are equal, round, and reactive to light. Right eye exhibits no discharge. Left eye exhibits no discharge. Neck: Painless normal range of motion. Neck supple. No LAD. Cardiovascular: Normal rate, regular rhythm, normal heart sounds and intact distal pulses. Exam reveals no gallop and no friction rub. No murmur heard. Pulmonary/Chest:  No respiratory distress. No wheezes. No rales. No rhonchi. No increased work of breathing. No accessory muscle use. Good air exchange throughout. Abdominal: soft, non-tender, no rebound or guarding. No hepatosplenomegaly. Normal bowel sounds throughout. Back: no tenderness to palpation, no deformities, no CVA tenderness  Extremities/Musculoskeletal: Normal range of motion. Mild tenderness to the LLE along the lateral aspect of the shin. No edema. Distal extremities are neurovasc intact. Lymphadenopathy:   No adenopathy. Neurological:  Alert and oriented to person, place, and time. Coordination normal. CN 2-12 intact. Motor and sensory function intact. Skin: Skin is warm and dry. No rash noted. No pallor. MDM 89y F here with LLE pain. Plan to check doppler. BP is up some but she has no symptoms. Will continue to monitor.       ED Course       Procedures

## 2018-03-17 NOTE — DISCHARGE INSTRUCTIONS
High Blood Pressure: Care Instructions  Your Care Instructions    If your blood pressure is usually above 140/90, you have high blood pressure, or hypertension. That means the top number is 140 or higher or the bottom number is 90 or higher, or both. Despite what a lot of people think, high blood pressure usually doesn't cause headaches or make you feel dizzy or lightheaded. It usually has no symptoms. But it does increase your risk for heart attack, stroke, and kidney or eye damage. The higher your blood pressure, the more your risk increases. Your doctor will give you a goal for your blood pressure. Your goal will be based on your health and your age. An example of a goal is to keep your blood pressure below 140/90. Lifestyle changes, such as eating healthy and being active, are always important to help lower blood pressure. You might also take medicine to reach your blood pressure goal.  Follow-up care is a key part of your treatment and safety. Be sure to make and go to all appointments, and call your doctor if you are having problems. It's also a good idea to know your test results and keep a list of the medicines you take. How can you care for yourself at home? Medical treatment  · If you stop taking your medicine, your blood pressure will go back up. You may take one or more types of medicine to lower your blood pressure. Be safe with medicines. Take your medicine exactly as prescribed. Call your doctor if you think you are having a problem with your medicine. · Talk to your doctor before you start taking aspirin every day. Aspirin can help certain people lower their risk of a heart attack or stroke. But taking aspirin isn't right for everyone, because it can cause serious bleeding. · See your doctor regularly. You may need to see the doctor more often at first or until your blood pressure comes down.   · If you are taking blood pressure medicine, talk to your doctor before you take decongestants or anti-inflammatory medicine, such as ibuprofen. Some of these medicines can raise blood pressure. · Learn how to check your blood pressure at home. Lifestyle changes  · Stay at a healthy weight. This is especially important if you put on weight around the waist. Losing even 10 pounds can help you lower your blood pressure. · If your doctor recommends it, get more exercise. Walking is a good choice. Bit by bit, increase the amount you walk every day. Try for at least 30 minutes on most days of the week. You also may want to swim, bike, or do other activities. · Avoid or limit alcohol. Talk to your doctor about whether you can drink any alcohol. · Try to limit how much sodium you eat to less than 2,300 milligrams (mg) a day. Your doctor may ask you to try to eat less than 1,500 mg a day. · Eat plenty of fruits (such as bananas and oranges), vegetables, legumes, whole grains, and low-fat dairy products. · Lower the amount of saturated fat in your diet. Saturated fat is found in animal products such as milk, cheese, and meat. Limiting these foods may help you lose weight and also lower your risk for heart disease. · Do not smoke. Smoking increases your risk for heart attack and stroke. If you need help quitting, talk to your doctor about stop-smoking programs and medicines. These can increase your chances of quitting for good. When should you call for help? Call 911 anytime you think you may need emergency care. This may mean having symptoms that suggest that your blood pressure is causing a serious heart or blood vessel problem. Your blood pressure may be over 180/110. ? For example, call 911 if:  ? · You have symptoms of a heart attack. These may include:  ¨ Chest pain or pressure, or a strange feeling in the chest.  ¨ Sweating. ¨ Shortness of breath. ¨ Nausea or vomiting.   ¨ Pain, pressure, or a strange feeling in the back, neck, jaw, or upper belly or in one or both shoulders or arms.  ¨ Lightheadedness or sudden weakness. ¨ A fast or irregular heartbeat. ? · You have symptoms of a stroke. These may include:  ¨ Sudden numbness, tingling, weakness, or loss of movement in your face, arm, or leg, especially on only one side of your body. ¨ Sudden vision changes. ¨ Sudden trouble speaking. ¨ Sudden confusion or trouble understanding simple statements. ¨ Sudden problems with walking or balance. ¨ A sudden, severe headache that is different from past headaches. ? · You have severe back or belly pain. ?Do not wait until your blood pressure comes down on its own. Get help right away. ?Call your doctor now or seek immediate care if:  ? · Your blood pressure is much higher than normal (such as 180/110 or higher), but you don't have symptoms. ? · You think high blood pressure is causing symptoms, such as:  ¨ Severe headache. ¨ Blurry vision. ? Watch closely for changes in your health, and be sure to contact your doctor if:  ? · Your blood pressure measures 140/90 or higher at least 2 times. That means the top number is 140 or higher or the bottom number is 90 or higher, or both. ? · You think you may be having side effects from your blood pressure medicine. ? · Your blood pressure is usually normal, but it goes above normal at least 2 times. Where can you learn more? Go to http://magalie-en.info/. Enter O430 in the search box to learn more about \"High Blood Pressure: Care Instructions. \"  Current as of: September 21, 2016  Content Version: 11.4  © 4665-9447 [employer]. Care instructions adapted under license by [employer] (which disclaims liability or warranty for this information). If you have questions about a medical condition or this instruction, always ask your healthcare professional. [place] [address] any warranty or liability for your use of this information.

## 2018-08-20 ENCOUNTER — HOSPITAL ENCOUNTER (OUTPATIENT)
Dept: CT IMAGING | Age: 83
Discharge: HOME | DRG: 280 | End: 2018-08-20
Attending: INTERNAL MEDICINE
Payer: MEDICARE

## 2018-08-20 DIAGNOSIS — I10 ESSENTIAL HYPERTENSION: ICD-10-CM

## 2018-08-20 DIAGNOSIS — R10.30 LOWER ABDOMINAL PAIN: ICD-10-CM

## 2018-08-20 DIAGNOSIS — R07.9 RIGHT-SIDED CHEST PAIN: ICD-10-CM

## 2018-08-20 DIAGNOSIS — Z79.899 ENCOUNTER FOR LONG-TERM (CURRENT) DRUG USE: ICD-10-CM

## 2018-08-20 PROCEDURE — 74176 CT ABD & PELVIS W/O CONTRAST: CPT

## 2018-08-21 ENCOUNTER — HOSPITAL ENCOUNTER (INPATIENT)
Age: 83
LOS: 3 days | Discharge: HOME | DRG: 280 | End: 2018-08-24
Attending: EMERGENCY MEDICINE | Admitting: FAMILY MEDICINE
Payer: MEDICARE

## 2018-08-21 DIAGNOSIS — R10.9 ACUTE ABDOMINAL PAIN: Primary | ICD-10-CM

## 2018-08-21 PROBLEM — R77.8 ELEVATED TROPONIN: Status: ACTIVE | Noted: 2018-08-21

## 2018-08-21 LAB
ALBUMIN SERPL-MCNC: 3.6 G/DL (ref 3.5–5)
ALBUMIN/GLOB SERPL: 1 {RATIO} (ref 1.1–2.2)
ALP SERPL-CCNC: 74 U/L (ref 45–117)
ALT SERPL-CCNC: 37 U/L (ref 12–78)
ANION GAP SERPL CALC-SCNC: 11 MMOL/L (ref 5–15)
APPEARANCE UR: CLEAR
ARTERIAL PATENCY WRIST A: ABNORMAL
ARTERIAL PATENCY WRIST A: ABNORMAL
AST SERPL-CCNC: 58 U/L (ref 15–37)
ATRIAL RATE: 73 BPM
BACTERIA URNS QL MICRO: NEGATIVE /HPF
BASE DEFICIT BLDV-SCNC: 8 MMOL/L
BASOPHILS # BLD: 0.1 K/UL (ref 0–0.1)
BASOPHILS NFR BLD: 1 % (ref 0–1)
BDY SITE: ABNORMAL
BDY SITE: ABNORMAL
BILIRUB SERPL-MCNC: 0.4 MG/DL (ref 0.2–1)
BILIRUB UR QL: NEGATIVE
BUN SERPL-MCNC: 10 MG/DL (ref 6–20)
BUN/CREAT SERPL: 11 (ref 12–20)
CALCIUM SERPL-MCNC: 8.4 MG/DL (ref 8.5–10.1)
CALCULATED P AXIS, ECG09: 62 DEGREES
CALCULATED R AXIS, ECG10: -48 DEGREES
CALCULATED T AXIS, ECG11: -66 DEGREES
CHLORIDE SERPL-SCNC: 104 MMOL/L (ref 97–108)
CK SERPL-CCNC: 134 U/L (ref 26–192)
CO2 SERPL-SCNC: 24 MMOL/L (ref 21–32)
COLOR UR: ABNORMAL
COMMENT, HOLDF: NORMAL
CREAT SERPL-MCNC: 0.9 MG/DL (ref 0.55–1.02)
DIAGNOSIS, 93000: NORMAL
DIFFERENTIAL METHOD BLD: NORMAL
EOSINOPHIL # BLD: 0.1 K/UL (ref 0–0.4)
EOSINOPHIL NFR BLD: 1 % (ref 0–7)
EPITH CASTS URNS QL MICRO: ABNORMAL /LPF
ERYTHROCYTE [DISTWIDTH] IN BLOOD BY AUTOMATED COUNT: 12.4 % (ref 11.5–14.5)
GAS FLOW.O2 O2 DELIVERY SYS: ABNORMAL L/MIN
GAS FLOW.O2 O2 DELIVERY SYS: ABNORMAL L/MIN
GLOBULIN SER CALC-MCNC: 3.5 G/DL (ref 2–4)
GLUCOSE SERPL-MCNC: 128 MG/DL (ref 65–100)
GLUCOSE UR STRIP.AUTO-MCNC: NEGATIVE MG/DL
HCO3 BLDV-SCNC: 17.3 MMOL/L (ref 23–28)
HCT VFR BLD AUTO: 38.8 % (ref 35–47)
HEMOCCULT STL QL: NEGATIVE
HGB BLD-MCNC: 13 G/DL (ref 11.5–16)
HGB UR QL STRIP: NEGATIVE
HYALINE CASTS URNS QL MICRO: ABNORMAL /LPF (ref 0–5)
IMM GRANULOCYTES # BLD: 0 K/UL (ref 0–0.04)
IMM GRANULOCYTES NFR BLD AUTO: 0 % (ref 0–0.5)
KETONES UR QL STRIP.AUTO: NEGATIVE MG/DL
LACTATE SERPL-SCNC: 2.2 MMOL/L (ref 0.4–2)
LEUKOCYTE ESTERASE UR QL STRIP.AUTO: ABNORMAL
LYMPHOCYTES # BLD: 1.8 K/UL (ref 0.8–3.5)
LYMPHOCYTES NFR BLD: 24 % (ref 12–49)
MAGNESIUM SERPL-MCNC: 1.5 MG/DL (ref 1.6–2.4)
MCH RBC QN AUTO: 30.1 PG (ref 26–34)
MCHC RBC AUTO-ENTMCNC: 33.5 G/DL (ref 30–36.5)
MCV RBC AUTO: 89.8 FL (ref 80–99)
MONOCYTES # BLD: 0.7 K/UL (ref 0–1)
MONOCYTES NFR BLD: 9 % (ref 5–13)
NEUTS SEG # BLD: 5 K/UL (ref 1.8–8)
NEUTS SEG NFR BLD: 65 % (ref 32–75)
NITRITE UR QL STRIP.AUTO: NEGATIVE
NRBC # BLD: 0 K/UL (ref 0–0.01)
NRBC BLD-RTO: 0 PER 100 WBC
O2/TOTAL GAS SETTING VFR VENT: 0.21 %
O2/TOTAL GAS SETTING VFR VENT: 0.21 %
P-R INTERVAL, ECG05: 158 MS
PCO2 BLDV: 28.1 MMHG (ref 41–51)
PH BLDV: 7.4 [PH] (ref 7.32–7.42)
PH UR STRIP: 7 [PH] (ref 5–8)
PLATELET # BLD AUTO: 247 K/UL (ref 150–400)
PMV BLD AUTO: 10.6 FL (ref 8.9–12.9)
PO2 BLDV: 113 MMHG (ref 25–40)
PO2 BLDV: 37 MMHG (ref 25–40)
POTASSIUM SERPL-SCNC: 3.1 MMOL/L (ref 3.5–5.1)
PROT SERPL-MCNC: 7.1 G/DL (ref 6.4–8.2)
PROT UR STRIP-MCNC: NEGATIVE MG/DL
Q-T INTERVAL, ECG07: 436 MS
QRS DURATION, ECG06: 120 MS
QTC CALCULATION (BEZET), ECG08: 473 MS
RBC # BLD AUTO: 4.32 M/UL (ref 3.8–5.2)
RBC #/AREA URNS HPF: ABNORMAL /HPF (ref 0–5)
SAMPLES BEING HELD,HOLD: NORMAL
SAO2 % BLDV: 72 % (ref 65–88)
SODIUM SERPL-SCNC: 139 MMOL/L (ref 136–145)
SP GR UR REFRACTOMETRY: 1.01 (ref 1–1.03)
SPECIMEN TYPE: ABNORMAL
SPECIMEN TYPE: ABNORMAL
TROPONIN I SERPL-MCNC: 0.13 NG/ML
UR CULT HOLD, URHOLD: NORMAL
UROBILINOGEN UR QL STRIP.AUTO: 0.2 EU/DL (ref 0.2–1)
VENTRICULAR RATE, ECG03: 71 BPM
WBC # BLD AUTO: 7.7 K/UL (ref 3.6–11)
WBC URNS QL MICRO: ABNORMAL /HPF (ref 0–4)

## 2018-08-21 PROCEDURE — 82803 BLOOD GASES ANY COMBINATION: CPT

## 2018-08-21 PROCEDURE — 80053 COMPREHEN METABOLIC PANEL: CPT | Performed by: EMERGENCY MEDICINE

## 2018-08-21 PROCEDURE — 83735 ASSAY OF MAGNESIUM: CPT | Performed by: EMERGENCY MEDICINE

## 2018-08-21 PROCEDURE — 99285 EMERGENCY DEPT VISIT HI MDM: CPT

## 2018-08-21 PROCEDURE — 96365 THER/PROPH/DIAG IV INF INIT: CPT

## 2018-08-21 PROCEDURE — 83605 ASSAY OF LACTIC ACID: CPT | Performed by: EMERGENCY MEDICINE

## 2018-08-21 PROCEDURE — 65660000000 HC RM CCU STEPDOWN

## 2018-08-21 PROCEDURE — 74011250637 HC RX REV CODE- 250/637: Performed by: EMERGENCY MEDICINE

## 2018-08-21 PROCEDURE — 93005 ELECTROCARDIOGRAM TRACING: CPT

## 2018-08-21 PROCEDURE — 81001 URINALYSIS AUTO W/SCOPE: CPT | Performed by: EMERGENCY MEDICINE

## 2018-08-21 PROCEDURE — 84484 ASSAY OF TROPONIN QUANT: CPT | Performed by: EMERGENCY MEDICINE

## 2018-08-21 PROCEDURE — 96366 THER/PROPH/DIAG IV INF ADDON: CPT

## 2018-08-21 PROCEDURE — 85025 COMPLETE CBC W/AUTO DIFF WBC: CPT | Performed by: EMERGENCY MEDICINE

## 2018-08-21 PROCEDURE — 96361 HYDRATE IV INFUSION ADD-ON: CPT

## 2018-08-21 PROCEDURE — 82272 OCCULT BLD FECES 1-3 TESTS: CPT | Performed by: EMERGENCY MEDICINE

## 2018-08-21 PROCEDURE — 74011250636 HC RX REV CODE- 250/636: Performed by: FAMILY MEDICINE

## 2018-08-21 PROCEDURE — 36415 COLL VENOUS BLD VENIPUNCTURE: CPT | Performed by: EMERGENCY MEDICINE

## 2018-08-21 PROCEDURE — 74011250637 HC RX REV CODE- 250/637: Performed by: FAMILY MEDICINE

## 2018-08-21 PROCEDURE — 96375 TX/PRO/DX INJ NEW DRUG ADDON: CPT

## 2018-08-21 PROCEDURE — 82550 ASSAY OF CK (CPK): CPT | Performed by: EMERGENCY MEDICINE

## 2018-08-21 PROCEDURE — 74011250636 HC RX REV CODE- 250/636: Performed by: EMERGENCY MEDICINE

## 2018-08-21 RX ORDER — DOXAZOSIN 1 MG/1
1 TABLET ORAL DAILY
Status: DISCONTINUED | OUTPATIENT
Start: 2018-08-22 | End: 2018-08-24 | Stop reason: HOSPADM

## 2018-08-21 RX ORDER — SODIUM CHLORIDE 9 MG/ML
50 INJECTION, SOLUTION INTRAVENOUS CONTINUOUS
Status: DISCONTINUED | OUTPATIENT
Start: 2018-08-21 | End: 2018-08-24

## 2018-08-21 RX ORDER — FLECAINIDE ACETATE 100 MG/1
100 TABLET ORAL 2 TIMES DAILY
Status: DISCONTINUED | OUTPATIENT
Start: 2018-08-21 | End: 2018-08-24 | Stop reason: HOSPADM

## 2018-08-21 RX ORDER — LORAZEPAM 0.5 MG/1
0.5 TABLET ORAL
Status: DISCONTINUED | OUTPATIENT
Start: 2018-08-21 | End: 2018-08-24 | Stop reason: HOSPADM

## 2018-08-21 RX ORDER — METRONIDAZOLE 500 MG/1
500 TABLET ORAL 3 TIMES DAILY
Status: ON HOLD | COMMUNITY
Start: 2018-08-18 | End: 2018-08-24

## 2018-08-21 RX ORDER — MORPHINE SULFATE 2 MG/ML
2 INJECTION, SOLUTION INTRAMUSCULAR; INTRAVENOUS
Status: COMPLETED | OUTPATIENT
Start: 2018-08-21 | End: 2018-08-21

## 2018-08-21 RX ORDER — LISINOPRIL 20 MG/1
20 TABLET ORAL DAILY
Status: DISCONTINUED | OUTPATIENT
Start: 2018-08-22 | End: 2018-08-24 | Stop reason: HOSPADM

## 2018-08-21 RX ORDER — ASPIRIN 81 MG/1
81 TABLET ORAL DAILY
Status: DISCONTINUED | OUTPATIENT
Start: 2018-08-22 | End: 2018-08-24 | Stop reason: HOSPADM

## 2018-08-21 RX ORDER — PANTOPRAZOLE SODIUM 40 MG/1
40 TABLET, DELAYED RELEASE ORAL
Status: DISCONTINUED | OUTPATIENT
Start: 2018-08-22 | End: 2018-08-24 | Stop reason: HOSPADM

## 2018-08-21 RX ORDER — ONDANSETRON 4 MG/1
4 TABLET, ORALLY DISINTEGRATING ORAL
Status: DISCONTINUED | OUTPATIENT
Start: 2018-08-21 | End: 2018-08-24 | Stop reason: HOSPADM

## 2018-08-21 RX ORDER — LOVASTATIN 20 MG/1
20 TABLET ORAL
Status: DISCONTINUED | OUTPATIENT
Start: 2018-08-21 | End: 2018-08-24 | Stop reason: HOSPADM

## 2018-08-21 RX ORDER — AMLODIPINE BESYLATE 5 MG/1
2.5 TABLET ORAL AS NEEDED
Status: DISCONTINUED | OUTPATIENT
Start: 2018-08-21 | End: 2018-08-24 | Stop reason: HOSPADM

## 2018-08-21 RX ORDER — POTASSIUM CHLORIDE 7.45 MG/ML
10 INJECTION INTRAVENOUS
Status: COMPLETED | OUTPATIENT
Start: 2018-08-21 | End: 2018-08-22

## 2018-08-21 RX ORDER — SODIUM CHLORIDE 0.9 % (FLUSH) 0.9 %
5-10 SYRINGE (ML) INJECTION AS NEEDED
Status: DISCONTINUED | OUTPATIENT
Start: 2018-08-21 | End: 2018-08-24 | Stop reason: HOSPADM

## 2018-08-21 RX ORDER — METRONIDAZOLE 500 MG/100ML
500 INJECTION, SOLUTION INTRAVENOUS
Status: COMPLETED | OUTPATIENT
Start: 2018-08-21 | End: 2018-08-21

## 2018-08-21 RX ORDER — ACETAMINOPHEN 325 MG/1
650 TABLET ORAL
Status: COMPLETED | OUTPATIENT
Start: 2018-08-21 | End: 2018-08-21

## 2018-08-21 RX ORDER — SODIUM CHLORIDE 9 MG/ML
125 INJECTION, SOLUTION INTRAVENOUS CONTINUOUS
Status: DISCONTINUED | OUTPATIENT
Start: 2018-08-21 | End: 2018-08-21

## 2018-08-21 RX ORDER — SODIUM CHLORIDE 0.9 % (FLUSH) 0.9 %
5-10 SYRINGE (ML) INJECTION EVERY 8 HOURS
Status: DISCONTINUED | OUTPATIENT
Start: 2018-08-21 | End: 2018-08-24 | Stop reason: HOSPADM

## 2018-08-21 RX ORDER — AMLODIPINE BESYLATE 5 MG/1
5 TABLET ORAL DAILY
Status: DISCONTINUED | OUTPATIENT
Start: 2018-08-22 | End: 2018-08-24 | Stop reason: HOSPADM

## 2018-08-21 RX ORDER — METRONIDAZOLE 500 MG/100ML
500 INJECTION, SOLUTION INTRAVENOUS EVERY 12 HOURS
Status: DISCONTINUED | OUTPATIENT
Start: 2018-08-22 | End: 2018-08-24

## 2018-08-21 RX ORDER — MORPHINE SULFATE 2 MG/ML
1 INJECTION, SOLUTION INTRAMUSCULAR; INTRAVENOUS
Status: DISCONTINUED | OUTPATIENT
Start: 2018-08-21 | End: 2018-08-24 | Stop reason: HOSPADM

## 2018-08-21 RX ORDER — METRONIDAZOLE 500 MG/1
500 TABLET ORAL 3 TIMES DAILY
COMMUNITY
End: 2018-08-21

## 2018-08-21 RX ADMIN — FLECAINIDE ACETATE 100 MG: 100 TABLET ORAL at 22:07

## 2018-08-21 RX ADMIN — MORPHINE SULFATE 2 MG: 2 INJECTION, SOLUTION INTRAMUSCULAR; INTRAVENOUS at 16:09

## 2018-08-21 RX ADMIN — SODIUM CHLORIDE 125 ML/HR: 900 INJECTION, SOLUTION INTRAVENOUS at 16:03

## 2018-08-21 RX ADMIN — LOVASTATIN 20 MG: 20 TABLET ORAL at 22:07

## 2018-08-21 RX ADMIN — SODIUM CHLORIDE 1000 ML: 900 INJECTION, SOLUTION INTRAVENOUS at 13:53

## 2018-08-21 RX ADMIN — METRONIDAZOLE 500 MG: 500 INJECTION, SOLUTION INTRAVENOUS at 16:03

## 2018-08-21 RX ADMIN — SODIUM CHLORIDE 100 ML/HR: 900 INJECTION, SOLUTION INTRAVENOUS at 22:07

## 2018-08-21 RX ADMIN — ACETAMINOPHEN 650 MG: 325 TABLET, FILM COATED ORAL at 13:44

## 2018-08-21 RX ADMIN — POTASSIUM CHLORIDE 10 MEQ: 10 INJECTION, SOLUTION INTRAVENOUS at 22:07

## 2018-08-21 RX ADMIN — Medication 10 ML: at 22:08

## 2018-08-21 RX ADMIN — POTASSIUM CHLORIDE 10 MEQ: 10 INJECTION, SOLUTION INTRAVENOUS at 23:49

## 2018-08-21 RX ADMIN — LORAZEPAM 0.5 MG: 0.5 TABLET ORAL at 22:07

## 2018-08-21 NOTE — IP AVS SNAPSHOT
[address] [place] [address] 
[phone] Patient: Elyssa Peck MRN: [id number] :8/10/1926 About your hospitalization You were admitted on:  2018 You last received care in the:  [facility] 6S NEURO-SCI TELE You were discharged on:  2018 Why you were hospitalized Your primary diagnosis was:  Not on File Your diagnoses also included:  Abdominal Pain, Elevated Troponin, Htn (Hypertension), Takotsubo Cardiomyopathy, Acute Non-St Elevation Myocardial Infarction (Nstemi) ([facility]) Follow-up Information Follow up With Details Comments Contact Info Irineo Sadler MD   [id number] [place] [address] 
[phone] Your Scheduled Appointments 2018 10:30 AM EDT  
ESTABLISHED PATIENT with MD Bre Hamm [address], [address] [facility] ([facility]) [address] [place] [address]  
[phone] 2018  3:15 PM EDT  
ESTABLISHED PATIENT with MD Bre Dumont IV, [address], [address] [facility] ([facility]) [address] [place] [address]  
[phone] Discharge Orders None A check mariza indicates which time of day the medication should be taken. My Medications CHANGE how you take these medications Instructions Each Dose to Equal  
 Morning Noon Evening Bedtime  
 metroNIDAZOLE 500 mg tablet Commonly known as:  FLAGYL What changed:  when to take this Your last dose was: Your next dose is: Take 1 Tab by mouth two (2) times a day for 5 days. Indications: Infectious Disease of Abdomen 500 mg CONTINUE taking these medications Instructions Each Dose to Equal  
 Morning Noon Evening Bedtime * amLODIPine 5 mg tablet Commonly known as:  Ross Anguiano Your last dose was: Your next dose is: TAKE 1 TABLET DAILY  
     
   
   
   
  
 * amLODIPine 2.5 mg tablet Commonly known as:  Surinder Corea Your last dose was: Your next dose is: Take 1 Tab by mouth as needed (SBP > 160). 2.5 mg  
    
   
   
   
  
 aspirin delayed-release 81 mg tablet Your last dose was: Your next dose is: Take 81 mg by mouth daily. 81 mg CLARITIN 10 mg tablet Generic drug:  loratadine Your last dose was: Your next dose is: Take 10 mg by mouth daily as needed. 10 mg  
    
   
   
   
  
 doxazosin 1 mg tablet Commonly known as:  CARDURA Your last dose was: Your next dose is: TAKE ONE-HALF (1/2) TABLET AT BEDTIME  
     
   
   
   
  
 flecainide 100 mg tablet Commonly known as:  TAMBOCOR Your last dose was: Your next dose is: Take 100 mg by mouth two (2) times a day. 100 mg FLONASE 50 mcg/actuation nasal spray Generic drug:  fluticasone Your last dose was: Your next dose is: 2 Sprays by Both Nostrils route as needed for Rhinitis. 2 Spray HYDROcodone-acetaminophen 5-325 mg per tablet Commonly known as:  Andreiaa Riggs Your last dose was: Your next dose is: Take 1 Tab by mouth two (2) times daily as needed for Pain. Max Daily Amount: 2 Tabs. Indications: Pain 1 Tab  
    
   
   
   
  
 lisinopril 20 mg tablet Commonly known as:  Brie [place] Your last dose was: Your next dose is: TAKE 1 TABLET DAILY LORazepam 0.5 mg tablet Commonly known as:  ATIVAN Your last dose was: Your next dose is: Take 1 Tab by mouth two (2) times daily as needed for Anxiety. Max Daily Amount: 1 mg. 0.5 mg  
    
   
   
   
  
 lovastatin 20 mg tablet Commonly known as:  MEVACOR Your last dose was: Your next dose is: TAKE 1 TABLET DAILY MIRALAX 17 gram/dose powder Generic drug:  polyethylene glycol Your last dose was: Your next dose is: Take 17 g by mouth daily. As needed 17 g  
    
   
   
   
  
 naloxone 2 mg/actuation Spry Commonly known as:  [employer] Your last dose was: Your next dose is:    
   
   
 1 spray  into 1 nostril. Use a new one for next spray. put  into alternating nostrils. May repeat every 2 to 3 minutes as needed. ondansetron 4 mg disintegrating tablet Commonly known as:  ZOFRAN ODT Your last dose was: Your next dose is: Take 1 Tab by mouth every six (6) hours as needed for Nausea. 4 mg  
    
   
   
   
  
 pantoprazole 40 mg tablet Commonly known as:  PROTONIX Your last dose was: Your next dose is: TAKE 1 TABLET DAILY  
     
   
   
   
  
 sodium chloride 0.65 % nasal squeeze bottle Commonly known as:  OCEAN Your last dose was: Your next dose is: 2 Sprays by Both Nostrils route every two (2) hours as needed for Congestion. 2 Spray * Notice: This list has 2 medication(s) that are the same as other medications prescribed for you. Read the directions carefully, and ask your doctor or other care provider to review them with you. Where to Get Your Medications Information on where to get these meds will be given to you by the nurse or doctor. ! Ask your nurse or doctor about these medications  
  metroNIDAZOLE 500 mg tablet Opioid Education  Prescription Opioids: What You Need to Know: 
 
Prescription opioids can be used to help relieve moderate-to-severe pain and are often prescribed following a surgery or injury, or for certain health conditions. These medications can be an important part of treatment but also come with serious risks. Opioids are strong pain medicines. Examples include hydrocodone, oxycodone, fentanyl, and morphine. Heroin is an example of an illegal opioid. It is important to work with your health care provider to make sure you are getting the safest, most effective care. WHAT ARE THE RISKS AND SIDE EFFECTS OF OPIOID USE? Prescription opioids carry serious risks of addiction and overdose, especially with prolonged use. An opioid overdose, often marked by slow breathing, can cause sudden death. The use of prescription opioids can have a number of side effects as well, even when taken as directed. · Tolerance-meaning you might need to take more of a medication for the same pain relief · Physical dependence-meaning you have symptoms of withdrawal when the medication is stopped. Withdrawal symptoms can include nausea, sweating, chills, diarrhea, stomach cramps, and muscle aches. Withdrawal can last up to several weeks, depending on which drug you took and how long you took it. · Increased sensitivity to pain · Constipation · Nausea, vomiting, and dry mouth · Sleepiness and dizziness · Confusion · Depression · Low levels of testosterone that can result in lower sex drive, energy, and strength · Itching and sweating RISKS ARE GREATER WITH:      
· History of drug misuse, substance use disorder, or overdose · Mental health conditions (such as depression or anxiety) · Sleep apnea · Older age (72 years or older) · Pregnancy Avoid alcohol while taking prescription opioids. Also, unless specifically advised by your health care provider, medications to avoid include: · Benzodiazepines (such as Xanax or Valium) · Muscle relaxants (such as Soma or Flexeril) · Hypnotics (such as Ambien or Lunesta) · Other prescription opioids KNOW YOUR OPTIONS Talk to your health care provider about ways to manage your pain that don't involve prescription opioids. Some of these options may actually work better and have fewer risks and side effects. Options may include: 
· Pain relievers such as acetaminophen, ibuprofen, and naproxen · Some medications that are also used for depression or seizures · Physical therapy and exercise · Counseling to help patients learn how to cope better with triggers of pain and stress. · Application of heat or cold compress · Massage therapy · Relaxation techniques Be Informed Make sure you know the name of your medication, how much and how often to take it, and its potential risks & side effects. IF YOU ARE PRESCRIBED OPIOIDS FOR PAIN: 
· Never take opioids in greater amounts or more often than prescribed. Remember the goal is not to be pain-free but to manage your pain at a tolerable level. · Follow up with your primary care provider to: · Work together to create a plan on how to manage your pain. · Talk about ways to help manage your pain that don't involve prescription opioids. · Talk about any and all concerns and side effects. · Help prevent misuse and abuse. · Never sell or share prescription opioids · Help prevent misuse and abuse. · Store prescription opioids in a secure place and out of reach of others (this may include visitors, children, friends, and family). · Safely dispose of unused/unwanted prescription opioids: Find your community drug take-back program or your pharmacy mail-back program, or flush them down the toilet, following guidance from the Food and Drug Administration (www.fda.gov/Drugs/ResourcesForYou). · Visit www.cdc.gov/drugoverdose to learn about the risks of opioid abuse and overdose. · If you believe you may be struggling with addiction, tell your health care provider and ask for guidance or call [facility] [employer] at 4-330-805-NEGU. Discharge Instructions Patient Discharge Instructions Zora Stewart / [id number] : 8/10/1926 Admitted 2018 Discharged: 2018 Take Home Medications · It is important that you take the medication exactly as they are prescribed. · Keep your medication in the bottles provided by the pharmacist and keep a list of the medication names, dosages, and times to be taken in your wallet. · Do not take other medications without consulting your doctor. What to do at [facility] Recommended diet: soft, bland Recommended activity: as tolerated . Take your Probiotic one pill daily If you experience any of the following symptoms : abdominal pain, dizziness, chest pain, shortness of breath, please follow up with :Dr Flakita Vance at [phone] Follow-up with Dr. Marely Casper on 18 at 8: 30 am  and Dr. Josselin Wallace in 1 month at his office. Information obtained by : 
I understand that if any problems occur once I am at home I am to contact my physician. I understand and acknowledge receipt of the instructions indicated above. Physician's or R.N.'s Signature                                                                  Date/Time Patient or Representative Signature                                                          Date/Time Introducing [facility] [place] [facility]! [employer] introduces [employer] patient portal. Now you can access parts of your medical record, email your doctor's office, and request medication refills online. 1. In your internet browser, go to [email or web address] 2. Click on the First Time User? Click Here link in the Sign In box. You will see the New Member Sign Up page. 3. Enter your [employer] Access Code exactly as it appears below. You will not need to use this code after youve completed the sign-up process. If you do not sign up before the expiration date, you must request a new code. · [employer] Access Code: [email or web address] Expires: 9/4/2018  4:28 PM 
 
4. Enter the last four digits of your Social Security Number (xxxx) and Date of Birth (mm/dd/yyyy) as indicated and click Submit. You will be taken to the next sign-up page. 5. Create a [employer] ID. This will be your [employer] login ID and cannot be changed, so think of one that is secure and easy to remember. 6. Create a [employer] password. You can change your password at any time. 7. Enter your Password Reset Question and Answer. This can be used at a later time if you forget your password. 8. Enter your e-mail address. You will receive e-mail notification when new information is available in [address] 9. Click Sign Up. You can now view and download portions of your medical record. 10. Click the Download Summary menu link to download a portable copy of your medical information. If you have questions, please visit the Frequently Asked Questions section of the [employer] website. Remember, [employer] is NOT to be used for urgent needs. For medical emergencies, dial 911. Now available from your iPhone and Android! Introducing Shaq Madsen As a [employer] patient, I wanted to make you aware of our electronic visit tool called Shaq Madsen. [employer] 24/7 allows you to connect within minutes with a medical provider 24 hours a day, seven days a week via a mobile device or tablet or logging into a secure website from your computer. You can access Shaq Masden from anywhere in the [place].  
 
A virtual visit might be right for you when you have a simple condition and feel like you just dont want to get out of bed, or cant get away from work for an appointment, when your regular [employer] provider is not available (evenings, weekends or holidays), or when youre out of town and need minor care. Electronic visits cost only $49 and if the [employer] 24/7 provider determines a prescription is needed to treat your condition, one can be electronically transmitted to a nearby pharmacy*. Please take a moment to enroll today if you have not already done so. The enrollment process is free and takes just a few minutes. To enroll, please download the [employer] 24/7 donis to your tablet or phone, or visit [email or web address] to enroll on your computer. And, as an [address] [place] [address] patient with a [employer] account, the results of your visits will be scanned into your electronic medical record and your primary care provider will be able to view the scanned results. We urge you to continue to see your regular [employer] provider for your ongoing medical care. And while your primary care provider may not be the one available when you seek a [employer] virtual visit, the peace of mind you get from getting a real diagnosis real time can be priceless. For more information on [employer], view our Frequently Asked Questions (FAQs) at [email or web address]. Sincerely, 
 
Iveth Loera MD 
Chief Medical Officer [employer] *:  certain medications cannot be prescribed via [employer] Providers Seen During Your Hospitalization Provider Specialty Primary office phone Denisse Castellon DO Emergency Medicine [phone] Aravind Singleton MD Internal Medicine [phone] Your Primary Care Physician (PCP) Primary Care Physician Office Phone Office Fax [address] [phone] You are allergic to the following Allergen Reactions Beta Blocker (Beta-Blockers (Beta-Adrenergic Blocking Agts)) Other (comments) Sick sinus syndrome Amoxicillin Nausea and Vomiting Codeine Nausea Only Donnatal (Phenobarb-Hyoscy-Atropine-Scop) Unknown (comments) Exelon (Rivastigmine) Other (comments) Nausea Other Medication Other (comments) Pneumovax 23 . Arm swelling Plavix (Clopidogrel) Rash Sular (Nisoldipine) Vertigo Recent Documentation Height Weight BMI OB Status Smoking Status 1.651 m 56.5 kg 20.73 kg/m2 Hysterectomy Never Smoker Emergency Contacts Name Discharge Info Relation Home Work Mobile Salma Juarez DISCHARGE CAREGIVER [3] Other Relative [6] [phone] Flor Cabral DISCHARGE CAREGIVER [3] Child [2] [phone] Hiren Peck YES [1] Child [2] [phone] Patient Belongings The following personal items are in your possession at time of discharge: 
  Dental Appliances: None  Visual Aid: Glasses, With patient      Home Medications: None   Jewelry: Necklace, Ring, Watch  Clothing: Pants, Shirt, Undergarments    Other Valuables: None Please provide this summary of care documentation to your next provider. Signatures-by signing, you are acknowledging that this After Visit Summary has been reviewed with you and you have received a copy. Patient Signature:  ____________________________________________________________ Date:  ____________________________________________________________  
  
Farideh Thalia Provider Signature:  ____________________________________________________________ Date:  ____________________________________________________________

## 2018-08-21 NOTE — ED NOTES
Pt's family informed RN that pt has \"subclavian stenosis\" and therefore blood pressures cannot be taken in right arm and will be inacurate. BP cuff was moved to left arm.  RN applied limb alert bracelet per family request.

## 2018-08-21 NOTE — ED TRIAGE NOTES
Hx of ischemic bowel and diverticulosis with repeat readmissions. Last Thursday, pt c/o abd pain, \"she vagals and her blood pressure drops\" according to granddaughter. Saw PCP, got CT but was \"unable to drink contrast so they weren't sure what was going on and sometimes it's a volvulus, or a twisted bowel\". Granddaughter reports that patient was feeling better- \"I let her have a bit of a waffle\"- but then \"I saw her going limp on the couch\". Pt was weak but no LOC and no confusion but \"was shaking and not answering things the way she usually does\". Pt a&ox3 currently and remembers the events of this morning, and was incontinent of diarrhea which is abnormal for patient except \"when she has the spells\". Pt started on bloodthinner \"Platil\" within the last two weeks.

## 2018-08-21 NOTE — ED NOTES
2:09 PM   Change of shift. Care of patient taken over from Dr. Ellen Flores to Dr. Savage Field; H&P reviewed, bedside handoff complete. Awaiting results, pt to be admitted. 2:35 PM   Pt's lactic acid is elevated, and troponin is elevated. Reviewed previous EKG, no STEMI shown.

## 2018-08-21 NOTE — ED PROVIDER NOTES
HPI Comments: 80 y.o. female with past medical history significant for hypercholesterolemia, HTN, acute ischemic colitis, diverticulosis, CAD, PAF, Carotid stenosis, GERD, and PVD who presents from home via EMS with chief complaint of abdominal pain. Pt's granddaughter reports onset 5 days ago of waxing and waning LLQ abdominal pain and nausea, similar in nature to multiple previous exacerbations of the pt's ischemic colitis. Has also had diverticulitis. Granddaughter (an RN) states the pt has not been a surgical candidate due to the pt's age. Granddaughter states the pt is on flagyl and was on Cipro for 24 hours, then stopped d/t a \"medication reaction\". Pt states her stools have been dark, denies any blood or significant diarrhea. Pt also reports decreased urination. Pt reports taking hydrocodone for chronic neck pain, last dose this morning. Granddaughter notes the pt complained of some midsternal chest pain which was relieved after Tums, Protonix, and Pepcid. Pt denies any vomiting. There are no other acute medical concerns at this time. Had 500 cc IVF on first day of illness which helped at the time. Old Chart Review:  Pt visited Dr. Sierra Chen office yesterday after experiencing 4 days of lower abdominal pain, with BP in the 100's. Pt had been taking Flagyl for 4 days, and Cipro for 2 days. Pt has hx of diverticulitis and ischemic colitis. CMP was normal, lipase was normal yesterday. Ct scan was negative. Social hx: Non-smoker; No EtOH use   PCP: Angela Pearl MD    Old chart reviewed - CT A/P from yesterday:  IMPRESSION:   1. No acute abdominal or pelvic abnormality. 2. Atherosclerotic abdominal aorta without aneurysm. 3. Bilateral renal artery stents. 4. Status post cholecystectomy. 5. Status post hysterectomy. 6. Status post appendectomy. 7. Diverticulosis. 8. L5-S1 degenerative disc disease.     Note written by Bertin Barahona, as dictated by Rafita Ambriz DO 1:26 PM    The history is provided by the patient, a relative and medical records (Granddaughter). No  was used. Past Medical History:   Diagnosis Date    Acute ischemic colitis ([facility]     Arrhythmia     Paroxysmal  Afib,  SSS    Carotid stenosis, bilateral     moderate , right >left    GERD (gastroesophageal reflux disease) 12/10/2011    HTN (hypertension) 12/8/2011    HX OTHER MEDICAL 2011    cardiac cath ;non obstructive CAD. Renal artery stents; open    HX OTHER MEDICAL 2011    LLQ abd. pain thought due to spasm.  Hypercholesterolemia     Hypertension     Osteoporosis     Osteoporosis 12/8/2011    PAF (paroxysmal atrial fibrillation) ([facility] 12/8/2011    [facility]     Rhinitis 12/8/2011    SSS (sick sinus syndrome) ([facility] 12/8/2011       Past Surgical History:   Procedure Laterality Date    ENDOSCOPY, COLON, DIAGNOSTIC  3/2/11    diverticulosis,hemorrhoids    HX CHOLECYSTECTOMY      HX HYSTERECTOMY      1960    HX OTHER SURGICAL      stent left leg    HX TONSILLECTOMY           Family History:   Problem Relation Age of Onset    Hypertension Mother        Social History     Social History    Marital status:      Spouse name: N/A    Number of children: N/A    Years of education: N/A     Occupational History    Not on file. Social History Main Topics    Smoking status: Never Smoker    Smokeless tobacco: Never Used    Alcohol use No    Drug use: No    Sexual activity: Not on file     Other Topics Concern    Not on file     Social History Narrative         ALLERGIES: Beta blocker [beta-blockers (beta-adrenergic blocking agts)]; Amoxicillin; Codeine; Donnatal [phenobarb-hyoscy-atropine-scop]; Exelon [rivastigmine]; Other medication; Plavix [clopidogrel]; and Sular [nisoldipine]    Review of Systems   Cardiovascular: Negative for chest pain (Resolved). Gastrointestinal: Positive for abdominal pain and nausea.  Negative for blood in stool, diarrhea and vomiting. Genitourinary: Positive for decreased urine volume. All other systems reviewed and are negative. Vitals:    08/21/18 1314   BP: (!) 78/35   Pulse: 68   Resp: 16   Temp: 97.7 °F (36.5 °C)   SpO2: 100%            Physical Exam   Constitutional: No distress. Frail, elderly, awake. HENT:   Head: Normocephalic. Mouth/Throat: Oropharynx is clear and moist.   Eyes: Conjunctivae are normal. Pupils are equal, round, and reactive to light. Neck: No tracheal deviation present. Cardiovascular: Normal rate, regular rhythm, normal heart sounds and intact distal pulses. Pulmonary/Chest: Effort normal and breath sounds normal.   Abdominal: Soft. There is tenderness (to deep palpation LLQ only). Genitourinary:   Genitourinary Comments: Formed brown stool in underwear. Musculoskeletal: She exhibits no edema. Neurological: She is alert. Skin: Skin is warm and dry. She is not diaphoretic. Psychiatric: She has a normal mood and affect. [facility]      ED Course       Procedures    ED EKG interpretation:  Rhythm: Sinus rhythm with occasional PVCs; and regular . Rate (approx.): 71 bpm; LAFB; Nonspecific ST wave changes noted. Note written by Bertin Silverman, as dictated by Maddison Lindsay DO 1:23 PM    CONSULT NOTE:  1:32 PM Maddison Lindsay DO spoke with Dr. Alicia Danielle MD Consult for PCP. Discussed available diagnostic tests and clinical findings. Dr. Alicia Danielle will admit the pt.     2:10 PM  Change of shift. Care of patient signed over to Dr. Sol Bishop. Bedside handoff complete. Labs pending on sign out.

## 2018-08-21 NOTE — PROGRESS NOTES
Admission Medication Reconciliation:    Information obtained from: Family member     Significant PMH/Disease States:   Past Medical History:   Diagnosis Date    Acute ischemic colitis ([facility] [address]     Arrhythmia     Paroxysmal  Afib,  SSS    Carotid stenosis, bilateral     moderate , right >left    GERD (gastroesophageal reflux disease) 12/10/2011    HTN (hypertension) 12/8/2011    HX OTHER MEDICAL 2011    cardiac cath ;non obstructive CAD. Renal artery stents; open    HX OTHER MEDICAL 2011    LLQ abd. pain thought due to spasm.  Hypercholesterolemia     Hypertension     Osteoporosis     Osteoporosis 12/8/2011    PAF (paroxysmal atrial fibrillation) ([facility] [address] 12/8/2011    PVD (peripheral vascular disease) ([facility] [address]     Rhinitis 12/8/2011    SSS (sick sinus syndrome) ([facility] [address] 12/8/2011       Chief Complaint for this Admission:  Abdominal pain     Allergies:  Beta blocker [beta-blockers (beta-adrenergic blocking agts)]; Amoxicillin; Codeine; Donnatal [phenobarb-hyoscy-atropine-scop]; Exelon [rivastigmine]; Other medication; Plavix [clopidogrel]; and Sular [nisoldipine]    Prior to Admission Medications:   Prior to Admission Medications   Prescriptions Last Dose Informant Patient Reported? Taking? HYDROcodone-acetaminophen (NORCO) 5-325 mg per tablet 8/21/2018 at Unknown time  No Yes   Sig: Take 1 Tab by mouth two (2) times daily as needed for Pain. Max Daily Amount: 2 Tabs. Indications: Pain   LORazepam (ATIVAN) 0.5 mg tablet   No Yes   Sig: Take 1 Tab by mouth two (2) times daily as needed for Anxiety. Max Daily Amount: 1 mg. amLODIPine (NORVASC) 2.5 mg tablet   No Yes   Sig: Take 1 Tab by mouth as needed (SBP > 160). amLODIPine (NORVASC) 5 mg tablet 8/20/2018 at Unknown time  No Yes   Sig: TAKE 1 TABLET DAILY   aspirin delayed-release 81 mg tablet 8/20/2018 at Unknown time  Yes Yes   Sig: Take 81 mg by mouth daily.    doxazosin (CARDURA) 1 mg tablet 8/20/2018 at Unknown time  No Yes   Sig: TAKE ONE-HALF (1/2) TABLET AT BEDTIME   flecainide (TAMBOCOR) 100 mg tablet 2018 at Unknown time  Yes Yes   Sig: Take 100 mg by mouth two (2) times a day. fluticasone (FLONASE) 50 mcg/actuation nasal spray   Yes Yes   Si Sprays by Both Nostrils route as needed for Rhinitis. lisinopril (PRINIVIL, ZESTRIL) 20 mg tablet 2018 at Unknown time  No Yes   Sig: TAKE 1 TABLET DAILY   loratadine (CLARITIN) 10 mg tablet   Yes Yes   Sig: Take 10 mg by mouth daily as needed. lovastatin (MEVACOR) 20 mg tablet 2018 at Unknown time  No Yes   Sig: TAKE 1 TABLET DAILY   metroNIDAZOLE (FLAGYL) 500 mg tablet 2018 at Unknown time  Yes Yes   Sig: Take 500 mg by mouth three (3) times daily. Indications: Infectious Disease of Abdomen   naloxone (NARCAN) 2 mg/actuation spry   No No   Si spray  into 1 nostril. Use a new one for next spray. put  into alternating nostrils. May repeat every 2 to 3 minutes as needed. ondansetron (ZOFRAN ODT) 4 mg disintegrating tablet   No No   Sig: Take 1 Tab by mouth every six (6) hours as needed for Nausea. pantoprazole (PROTONIX) 40 mg tablet   No No   Sig: TAKE 1 TABLET DAILY   polyethylene glycol (MIRALAX) 17 gram/dose powder   Yes No   Sig: Take 17 g by mouth daily. As needed   sodium chloride (OCEAN) 0.65 % nasal spray   No No   Si Sprays by Both Nostrils route every two (2) hours as needed for Congestion. Facility-Administered Medications: None         Comments/Recommendations: Med rec completed after speaking with the patient's family members. A list was also presented from home.       Removed Calcium and MVI    Added Metronidazole, currently on a 5 day course which started on  for suspected intra-abdominal infection    She has been taking prn hydrocodone on a more scheduled basis BID due to recent leg pain    Ronny Bryson PharmD

## 2018-08-21 NOTE — H&P
History and Physical    Subjective:   LAKEISHA King is a 80 y.o.  female who presents with LLQ abdominal pain for the last 3 days. Has long H/O episodes of diverticulitis and ischemic bowel disease. Saw Dr David Pascual in office yesterday and had labs and a CT abd with oral contrast not revealing of significant abnormality. Had some chest pain last night which has resolved now. Troponin noted in ER of 0.13. Colt De Paz Past Medical History:   Diagnosis Date    Acute ischemic colitis ([address]     Arrhythmia     Paroxysmal  Afib,  SSS    Carotid stenosis, bilateral     moderate , right >left    GERD (gastroesophageal reflux disease) 12/10/2011    HTN (hypertension) 12/8/2011    HX OTHER MEDICAL 2011    cardiac cath ;non obstructive CAD. Renal artery stents; open    HX OTHER MEDICAL 2011    LLQ abd. pain thought due to spasm.  Hypercholesterolemia     Hypertension     Osteoporosis     Osteoporosis 12/8/2011    PAF (paroxysmal atrial fibrillation) ([address] 12/8/2011    [facility]VD [facility]peripheral vascular disease) [facility] [address]     Rhinitis 12/8/2011    SSS (sick sinus syndrome) ([facility] [address] 12/8/2011      Past Surgical History:   Procedure Laterality Date    ENDOSCOPY, COLON, DIAGNOSTIC  3/2/11    diverticulosis,hemorrhoids    HX CHOLECYSTECTOMY      HX HYSTERECTOMY      1960    HX OTHER SURGICAL      stent left leg    HX TONSILLECTOMY       Family History   Problem Relation Age of Onset    Hypertension Mother       Social History   Substance Use Topics    Smoking status: Never Smoker    Smokeless tobacco: Never Used    Alcohol use No       Prior to Admission medications    Medication Sig Start Date End Date Taking? Authorizing Provider   metroNIDAZOLE (FLAGYL) 500 mg tablet Take 500 mg by mouth three (3) times daily. Indications: Infectious Disease of Abdomen 8/18/18 8/23/18 Yes Historical Provider   HYDROcodone-acetaminophen (NORCO) 5-325 mg per tablet Take 1 Tab by mouth two (2) times daily as needed for Pain.  Max Daily Amount: 2 Tabs. Indications: Pain 8/16/18  Yes Desi Gonzalez IV, MD   doxazosin (CARDURA) 1 mg tablet TAKE ONE-HALF (1/2) TABLET AT BEDTIME 6/30/18  Yes Desi Gonzalez IV, MD   amLODIPine (NORVASC) 2.5 mg tablet Take 1 Tab by mouth as needed (SBP > 160). 3/19/18  Yes Desi Gonzalez IV, MD   LORazepam (ATIVAN) 0.5 mg tablet Take 1 Tab by mouth two (2) times daily as needed for Anxiety. Max Daily Amount: 1 mg. 3/9/18  Yes Desi Gonzalez IV, MD   amLODIPine (NORVASC) 5 mg tablet TAKE 1 TABLET DAILY 8/13/17  Yes Desi Gonzalez IV, MD   lovastatin (MEVACOR) 20 mg tablet TAKE 1 TABLET DAILY 8/2/17  Yes Desi Gonzalez IV, MD   lisinopril (PRINIVIL, ZESTRIL) 20 mg tablet TAKE 1 TABLET DAILY 7/2/17  Yes Desi Gonzalez IV, MD   aspirin delayed-release 81 mg tablet Take 81 mg by mouth daily. Yes Historical Provider   flecainide (TAMBOCOR) 100 mg tablet Take 100 mg by mouth two (2) times a day. Yes Historical Provider   fluticasone (FLONASE) 50 mcg/actuation nasal spray 2 Sprays by Both Nostrils route as needed for Rhinitis. Yes Historical Provider   loratadine (CLARITIN) 10 mg tablet Take 10 mg by mouth daily as needed. Yes Historical Provider   ondansetron (ZOFRAN ODT) 4 mg disintegrating tablet Take 1 Tab by mouth every six (6) hours as needed for Nausea. 8/20/18   Desi Gonzalez IV, MD   pantoprazole (PROTONIX) 40 mg tablet TAKE 1 TABLET DAILY 6/2/18   Desi Gonzalez IV, MD   naloxone [facility]) 2 mg/actuation spry 1 spray  into 1 nostril. Use a new one for next spray. put  into alternating nostrils. May repeat every 2 to 3 minutes as needed. 3/19/18   Desi Gonzalez IV, MD   sodium chloride (OCEAN) 0.65 % nasal spray 2 Sprays by Both Nostrils route every two (2) hours as needed for Congestion. 1/15/18   Desi Gonzalez IV, MD   polyethylene glycol (MIRALAX) 17 gram/dose powder Take 17 g by mouth daily.  As needed    Historical Provider     Allergies   Allergen Reactions    Beta Blocker [Beta-Blockers (Beta-Adrenergic Blocking Agts)] Other (comments)     Sick sinus syndrome      Amoxicillin Nausea and Vomiting    Codeine Nausea Only    Donnatal [Phenobarb-Hyoscy-Atropine-Scop] Unknown (comments)    Exelon [Rivastigmine] Other (comments)     Nausea     Other Medication Other (comments)     Pneumovax 23 . Arm swelling     Plavix [Clopidogrel] Rash    Sular [Nisoldipine] Vertigo      Health Maintenance   Topic Date Due    GLAUCOMA SCREENING Q2Y  08/10/1991    DTaP/Tdap/Td series (1 - Tdap) 11/01/2004    MEDICARE YEARLY EXAM  03/14/2018    Influenza Age 9 to Adult  08/01/2018    ZOSTER VACCINE AGE 60>  Completed    Pneumococcal 65+ Low/Medium Risk  Completed       Review of Systems:  A comprehensive review of systems was negative except for that written in the History of Present Illness. Objective:      Intake and Output:            Physical Exam:   Visit Vitals    /52    Pulse 64    Temp 97.7 °F (36.5 °C)    Resp 22    SpO2 98%     Neck: supple, symmetrical, trachea midline, no adenopathy, thyroid: not enlarged, symmetric, no tenderness/mass/nodules, no carotid bruit and no JVD  Lungs: clear to auscultation bilaterally  Heart: regular rate and rhythm, S1, S2 normal, no murmur, click, rub or gallop  Abdomen: mild LLQ tenderness, soft, + BS  Extremities: extremities normal, atraumatic, no cyanosis or edema  Neurologic: Grossly normal        Data Review:   Recent Results (from the past 24 hour(s))   EKG, 12 LEAD, INITIAL    Collection Time: 08/21/18  1:21 PM   Result Value Ref Range    Ventricular Rate 71 BPM    Atrial Rate 73 BPM    P-R Interval 158 ms    QRS Duration 120 ms    Q-T Interval 436 ms    QTC Calculation (Bezet) 473 ms    Calculated P Axis 62 degrees    Calculated R Axis -48 degrees    Calculated T Axis -66 degrees    Diagnosis       Sinus rhythm with occasional and consecutive premature ventricular complexes  Left anterior fascicular block  Anteroseptal infarct , age undetermined  ST & T wave abnormality, consider lateral ischemia  When compared with ECG of 25-APR-2017 15:37,  QRS duration has decreased  T wave inversion now evident in Inferior leads  T wave inversion more evident in Lateral leads  Confirmed by Jessica Abdullahi M.D., Saira Mann ([id number]) on 8/21/2018 3:46:35 PM     LACTIC ACID    Collection Time: 08/21/18  1:24 PM   Result Value Ref Range    Lactic acid 2.2 (HH) 0.4 - 2.0 MMOL/L   SAMPLES BEING HELD    Collection Time: 08/21/18  1:24 PM   Result Value Ref Range    SAMPLES BEING HELD 1RED 1BLUE     COMMENT        Add-on orders for these samples will be processed based on acceptable specimen integrity and analyte stability, which may vary by analyte. CBC WITH AUTOMATED DIFF    Collection Time: 08/21/18  1:24 PM   Result Value Ref Range    WBC 7.7 3.6 - 11.0 K/uL    RBC 4.32 3.80 - 5.20 M/uL    HGB 13.0 11.5 - 16.0 g/dL    HCT 38.8 35.0 - 47.0 %    MCV 89.8 80.0 - 99.0 FL    MCH 30.1 26.0 - 34.0 PG    MCHC 33.5 30.0 - 36.5 g/dL    RDW 12.4 11.5 - 14.5 %    PLATELET 041 242 - 275 K/uL    MPV 10.6 8.9 - 12.9 FL    NRBC 0.0 0  WBC    ABSOLUTE NRBC 0.00 0.00 - 0.01 K/uL    NEUTROPHILS 65 32 - 75 %    LYMPHOCYTES 24 12 - 49 %    MONOCYTES 9 5 - 13 %    EOSINOPHILS 1 0 - 7 %    BASOPHILS 1 0 - 1 %    IMMATURE GRANULOCYTES 0 0.0 - 0.5 %    ABS. NEUTROPHILS 5.0 1.8 - 8.0 K/UL    ABS. LYMPHOCYTES 1.8 0.8 - 3.5 K/UL    ABS. MONOCYTES 0.7 0.0 - 1.0 K/UL    ABS. EOSINOPHILS 0.1 0.0 - 0.4 K/UL    ABS. BASOPHILS 0.1 0.0 - 0.1 K/UL    ABS. IMM.  GRANS. 0.0 0.00 - 0.04 K/UL    DF AUTOMATED     METABOLIC PANEL, COMPREHENSIVE    Collection Time: 08/21/18  1:24 PM   Result Value Ref Range    Sodium 139 136 - 145 mmol/L    Potassium 3.1 (L) 3.5 - 5.1 mmol/L    Chloride 104 97 - 108 mmol/L    CO2 24 21 - 32 mmol/L    Anion gap 11 5 - 15 mmol/L    Glucose 128 (H) 65 - 100 mg/dL    BUN 10 6 - 20 MG/DL    Creatinine 0.90 0.55 - 1.02 MG/DL    BUN/Creatinine ratio 11 (L) 12 - 20      GFR est AA >60 >60 ml/min/1.73m2    GFR est non-AA 59 (L) >60 ml/min/1.73m2    Calcium 8.4 (L) 8.5 - 10.1 MG/DL    Bilirubin, total 0.4 0.2 - 1.0 MG/DL    ALT (SGPT) 37 12 - 78 U/L    AST (SGOT) 58 (H) 15 - 37 U/L    Alk. phosphatase 74 45 - 117 U/L    Protein, total 7.1 6.4 - 8.2 g/dL    Albumin 3.6 3.5 - 5.0 g/dL    Globulin 3.5 2.0 - 4.0 g/dL    A-G Ratio 1.0 (L) 1.1 - 2.2     URINALYSIS W/MICROSCOPIC    Collection Time: 08/21/18  1:24 PM   Result Value Ref Range    Color YELLOW/STRAW      Appearance CLEAR CLEAR      Specific gravity 1.007 1.003 - 1.030      pH (UA) 7.0 5.0 - 8.0      Protein NEGATIVE  NEG mg/dL    Glucose NEGATIVE  NEG mg/dL    Ketone NEGATIVE  NEG mg/dL    Bilirubin NEGATIVE  NEG      Blood NEGATIVE  NEG      Urobilinogen 0.2 0.2 - 1.0 EU/dL    Nitrites NEGATIVE  NEG      Leukocyte Esterase TRACE (A) NEG      WBC 0-4 0 - 4 /hpf    RBC 0-5 0 - 5 /hpf    Epithelial cells FEW FEW /lpf    Bacteria NEGATIVE  NEG /hpf    Hyaline cast 2-5 0 - 5 /lpf   URINE CULTURE HOLD SAMPLE    Collection Time: 08/21/18  1:24 PM   Result Value Ref Range    Urine culture hold        URINE ON HOLD IN MICROBIOLOGY DEPT FOR 3 DAYS. IF UNPRESERVED URINE IS SUBMITTED, IT CANNOT BE USED FOR ADDITIONAL TESTING AFTER 24 HRS, RECOLLECTION WILL BE REQUIRED.    OCCULT BLOOD, STOOL    Collection Time: 08/21/18  1:24 PM   Result Value Ref Range    Occult blood, stool NEGATIVE  NEG     TROPONIN I    Collection Time: 08/21/18  1:24 PM   Result Value Ref Range    Troponin-I, Qt. 0.13 (H) <0.05 ng/mL   MAGNESIUM    Collection Time: 08/21/18  1:24 PM   Result Value Ref Range    Magnesium 1.5 (L) 1.6 - 2.4 mg/dL   CK W/ REFLX CKMB    Collection Time: 08/21/18  1:24 PM   Result Value Ref Range     26 - 192 U/L   POC VENOUS BLOOD GAS    Collection Time: 08/21/18  2:36 PM   Result Value Ref Range    Device: ROOM AIR      FIO2 (POC) 0.21 %    pO2, venous (POC) 113 (H) 25 - 40 mmHg    Allens test (POC) N/A Site OTHER      Specimen type (POC) VENOUS BLOOD     POC VENOUS BLOOD GAS    Collection Time: 08/21/18  3:09 PM   Result Value Ref Range    Device: ROOM AIR      FIO2 (POC) 0.21 %    pH, venous (POC) 7.398 7.32 - 7.42      pCO2, venous (POC) 28.1 (L) 41 - 51 MMHG    pO2, venous (POC) 37 25 - 40 mmHg    HCO3, venous (POC) 17.3 (L) 23.0 - 28.0 MMOL/L    sO2, venous (POC) 72 65 - 88 %    Base deficit, venous (POC) 8 mmol/L    Allens test (POC) N/A      Site OTHER      Specimen type (POC) VENOUS BLOOD         CT abd and pelvis with oral contrast- 1. No acute abdominal or pelvic abnormality. 2. Atherosclerotic abdominal aorta without aneurysm. 3. Bilateral renal artery stents. 4. Status post cholecystectomy. 5. Status post hysterectomy. 6. Status post appendectomy. 7. Diverticulosis. 8. L5-S1 degenerative disc disease. Assessment:     Active Problems:    Abdominal pain (12/10/2011)      Overview: LLQ , 2011, thought due to intestinal spasm      Elevated troponin (8/21/2018)        Plan:     1) Admit telemetry  2) IV Flagyl as she has done well with in past.   3) IVF  4) Follow serial troponins  5) Cardiology consult- normally follows with ENEDINA Bishop.   6) clear liquids  7) replete K  D/W family present    Signed By: Letty Sharpe MD     August 21, 2018

## 2018-08-21 NOTE — IP AVS SNAPSHOT
[address] 
[phone] Patient: Alex Peck MRN: [id number] :8/10/1926 A check mariza indicates which time of day the medication should be taken. My Medications CHANGE how you take these medications Instructions Each Dose to Equal  
 Morning Noon Evening Bedtime  
 metroNIDAZOLE 500 mg tablet Commonly known as:  FLAGYL What changed:  when to take this Your last dose was: Your next dose is: Take 1 Tab by mouth two (2) times a day for 5 days. Indications: Infectious Disease of Abdomen 500 mg CONTINUE taking these medications Instructions Each Dose to Equal  
 Morning Noon Evening Bedtime * amLODIPine 5 mg tablet Commonly known as:  [employer] Your last dose was: Your next dose is: TAKE 1 TABLET DAILY  
     
   
   
   
  
 * amLODIPine 2.5 mg tablet Commonly known as:  Love Breach Your last dose was: Your next dose is: Take 1 Tab by mouth as needed (SBP > 160). 2.5 mg  
    
   
   
   
  
 aspirin delayed-release 81 mg tablet Your last dose was: Your next dose is: Take 81 mg by mouth daily. 81 mg CLARITIN 10 mg tablet Generic drug:  loratadine Your last dose was: Your next dose is: Take 10 mg by mouth daily as needed. 10 mg  
    
   
   
   
  
 doxazosin 1 mg tablet Commonly known as:  CARDURA Your last dose was: Your next dose is: TAKE ONE-HALF (1/2) TABLET AT BEDTIME  
     
   
   
   
  
 flecainide 100 mg tablet Commonly known as:  TAMBOCOR Your last dose was: Your next dose is: Take 100 mg by mouth two (2) times a day. 100 mg FLONASE 50 mcg/actuation nasal spray Generic drug:  fluticasone Your last dose was: Your next dose is: 2 Sprays by Both Nostrils route as needed for Rhinitis. 2 Spray HYDROcodone-acetaminophen 5-325 mg per tablet Commonly known as:  Olivia Lynn Your last dose was: Your next dose is: Take 1 Tab by mouth two (2) times daily as needed for Pain. Max Daily Amount: 2 Tabs. Indications: Pain 1 Tab  
    
   
   
   
  
 lisinopril 20 mg tablet Commonly known as:  Vicki Marcos Your last dose was: Your next dose is: TAKE 1 TABLET DAILY LORazepam 0.5 mg tablet Commonly known as:  ATIVAN Your last dose was: Your next dose is: Take 1 Tab by mouth two (2) times daily as needed for Anxiety. Max Daily Amount: 1 mg. 0.5 mg  
    
   
   
   
  
 lovastatin 20 mg tablet Commonly known as:  MEVACOR Your last dose was: Your next dose is: TAKE 1 TABLET DAILY MIRALAX 17 gram/dose powder Generic drug:  polyethylene glycol Your last dose was: Your next dose is: Take 17 g by mouth daily. As needed 17 g  
    
   
   
   
  
 naloxone 2 mg/actuation Spry Commonly known as:  [employer] Your last dose was: Your next dose is:    
   
   
 1 spray  into 1 nostril. Use a new one for next spray. put  into alternating nostrils. May repeat every 2 to 3 minutes as needed. ondansetron 4 mg disintegrating tablet Commonly known as:  ZOFRAN ODT Your last dose was: Your next dose is: Take 1 Tab by mouth every six (6) hours as needed for Nausea. 4 mg  
    
   
   
   
  
 pantoprazole 40 mg tablet Commonly known as:  PROTONIX Your last dose was: Your next dose is: TAKE 1 TABLET DAILY  
     
   
   
   
  
 sodium chloride 0.65 % nasal squeeze bottle Commonly known as:  OCEAN Your last dose was: Your next dose is: 2 Sprays by Both Nostrils route every two (2) hours as needed for Congestion. 2 Spray * Notice: This list has 2 medication(s) that are the same as other medications prescribed for you. Read the directions carefully, and ask your doctor or other care provider to review them with you. Where to Get Your Medications Information on where to get these meds will be given to you by the nurse or doctor. ! Ask your nurse or doctor about these medications  
  metroNIDAZOLE 500 mg tablet

## 2018-08-21 NOTE — ED NOTES
Pt had small amount of semi-sift stool in underwear on arrival.  No josé blood seen. ER MD visualized. Female at bedside also requesting we \"hypersaturate\" patient with oxygen via nasal cannula \"because we've done this before with her and that's what needs to be done. \"  Will notify ER MD of family request.  Pt remains % on monitor on room air.

## 2018-08-22 LAB
ANION GAP SERPL CALC-SCNC: 8 MMOL/L (ref 5–15)
BASOPHILS # BLD: 0.1 K/UL (ref 0–0.1)
BASOPHILS NFR BLD: 1 % (ref 0–1)
BUN SERPL-MCNC: 6 MG/DL (ref 6–20)
BUN/CREAT SERPL: 11 (ref 12–20)
CALCIUM SERPL-MCNC: 8 MG/DL (ref 8.5–10.1)
CHLORIDE SERPL-SCNC: 106 MMOL/L (ref 97–108)
CO2 SERPL-SCNC: 25 MMOL/L (ref 21–32)
CREAT SERPL-MCNC: 0.54 MG/DL (ref 0.55–1.02)
DIFFERENTIAL METHOD BLD: NORMAL
EOSINOPHIL # BLD: 0.2 K/UL (ref 0–0.4)
EOSINOPHIL NFR BLD: 2 % (ref 0–7)
ERYTHROCYTE [DISTWIDTH] IN BLOOD BY AUTOMATED COUNT: 12.7 % (ref 11.5–14.5)
GLUCOSE SERPL-MCNC: 78 MG/DL (ref 65–100)
HCT VFR BLD AUTO: 38.2 % (ref 35–47)
HGB BLD-MCNC: 12.8 G/DL (ref 11.5–16)
IMM GRANULOCYTES # BLD: 0 K/UL (ref 0–0.04)
IMM GRANULOCYTES NFR BLD AUTO: 0 % (ref 0–0.5)
LYMPHOCYTES # BLD: 1.5 K/UL (ref 0.8–3.5)
LYMPHOCYTES NFR BLD: 17 % (ref 12–49)
MCH RBC QN AUTO: 30.6 PG (ref 26–34)
MCHC RBC AUTO-ENTMCNC: 33.5 G/DL (ref 30–36.5)
MCV RBC AUTO: 91.4 FL (ref 80–99)
MONOCYTES # BLD: 0.8 K/UL (ref 0–1)
MONOCYTES NFR BLD: 10 % (ref 5–13)
NEUTS SEG # BLD: 6.2 K/UL (ref 1.8–8)
NEUTS SEG NFR BLD: 71 % (ref 32–75)
NRBC # BLD: 0 K/UL (ref 0–0.01)
NRBC BLD-RTO: 0 PER 100 WBC
PLATELET # BLD AUTO: 264 K/UL (ref 150–400)
PMV BLD AUTO: 10.8 FL (ref 8.9–12.9)
POTASSIUM SERPL-SCNC: 3.2 MMOL/L (ref 3.5–5.1)
RBC # BLD AUTO: 4.18 M/UL (ref 3.8–5.2)
SODIUM SERPL-SCNC: 139 MMOL/L (ref 136–145)
TROPONIN I SERPL-MCNC: 0.25 NG/ML
WBC # BLD AUTO: 8.8 K/UL (ref 3.6–11)

## 2018-08-22 PROCEDURE — 36415 COLL VENOUS BLD VENIPUNCTURE: CPT | Performed by: FAMILY MEDICINE

## 2018-08-22 PROCEDURE — 74011250636 HC RX REV CODE- 250/636: Performed by: INTERNAL MEDICINE

## 2018-08-22 PROCEDURE — 74011250637 HC RX REV CODE- 250/637: Performed by: INTERNAL MEDICINE

## 2018-08-22 PROCEDURE — 80048 BASIC METABOLIC PNL TOTAL CA: CPT | Performed by: FAMILY MEDICINE

## 2018-08-22 PROCEDURE — B2111ZZ FLUOROSCOPY OF MULTIPLE CORONARY ARTERIES USING LOW OSMOLAR CONTRAST: ICD-10-PCS | Performed by: INTERNAL MEDICINE

## 2018-08-22 PROCEDURE — C1894 INTRO/SHEATH, NON-LASER: HCPCS

## 2018-08-22 PROCEDURE — 94760 N-INVAS EAR/PLS OXIMETRY 1: CPT

## 2018-08-22 PROCEDURE — 65660000000 HC RM CCU STEPDOWN

## 2018-08-22 PROCEDURE — 74011250636 HC RX REV CODE- 250/636: Performed by: FAMILY MEDICINE

## 2018-08-22 PROCEDURE — 74011636320 HC RX REV CODE- 636/320: Performed by: INTERNAL MEDICINE

## 2018-08-22 PROCEDURE — B4181ZZ FLUOROSCOPY OF BILATERAL RENAL ARTERIES USING LOW OSMOLAR CONTRAST: ICD-10-PCS | Performed by: INTERNAL MEDICINE

## 2018-08-22 PROCEDURE — 74011250637 HC RX REV CODE- 250/637: Performed by: FAMILY MEDICINE

## 2018-08-22 PROCEDURE — 93458 L HRT ARTERY/VENTRICLE ANGIO: CPT

## 2018-08-22 PROCEDURE — 77030013744

## 2018-08-22 PROCEDURE — B2151ZZ FLUOROSCOPY OF LEFT HEART USING LOW OSMOLAR CONTRAST: ICD-10-PCS | Performed by: INTERNAL MEDICINE

## 2018-08-22 PROCEDURE — B41DZZZ FLUOROSCOPY OF AORTA AND BILATERAL LOWER EXTREMITY ARTERIES: ICD-10-PCS | Performed by: INTERNAL MEDICINE

## 2018-08-22 PROCEDURE — 84484 ASSAY OF TROPONIN QUANT: CPT | Performed by: FAMILY MEDICINE

## 2018-08-22 PROCEDURE — 4A023N7 MEASUREMENT OF CARDIAC SAMPLING AND PRESSURE, LEFT HEART, PERCUTANEOUS APPROACH: ICD-10-PCS | Performed by: INTERNAL MEDICINE

## 2018-08-22 PROCEDURE — 77010033678 HC OXYGEN DAILY

## 2018-08-22 PROCEDURE — C1760 CLOSURE DEV, VASC: HCPCS

## 2018-08-22 PROCEDURE — 77030004533 HC CATH ANGI DX IMP BSC -B

## 2018-08-22 PROCEDURE — 85025 COMPLETE CBC W/AUTO DIFF WBC: CPT | Performed by: FAMILY MEDICINE

## 2018-08-22 RX ORDER — SODIUM CHLORIDE 0.9 % (FLUSH) 0.9 %
10 SYRINGE (ML) INJECTION AS NEEDED
Status: DISCONTINUED | OUTPATIENT
Start: 2018-08-22 | End: 2018-08-22 | Stop reason: HOSPADM

## 2018-08-22 RX ORDER — SODIUM CHLORIDE 9 MG/ML
3 INJECTION, SOLUTION INTRAVENOUS CONTINUOUS
Status: DISPENSED | OUTPATIENT
Start: 2018-08-22 | End: 2018-08-22

## 2018-08-22 RX ORDER — MAGNESIUM SULFATE 1 G/100ML
1 INJECTION INTRAVENOUS ONCE
Status: COMPLETED | OUTPATIENT
Start: 2018-08-22 | End: 2018-08-23

## 2018-08-22 RX ORDER — HEPARIN SODIUM 1000 [USP'U]/ML
5000 INJECTION, SOLUTION INTRAVENOUS; SUBCUTANEOUS AS NEEDED
Status: DISCONTINUED | OUTPATIENT
Start: 2018-08-22 | End: 2018-08-22

## 2018-08-22 RX ORDER — FENTANYL CITRATE 50 UG/ML
25-200 INJECTION, SOLUTION INTRAMUSCULAR; INTRAVENOUS
Status: DISCONTINUED | OUTPATIENT
Start: 2018-08-22 | End: 2018-08-22

## 2018-08-22 RX ORDER — MIDAZOLAM HYDROCHLORIDE 1 MG/ML
1-10 INJECTION, SOLUTION INTRAMUSCULAR; INTRAVENOUS
Status: DISCONTINUED | OUTPATIENT
Start: 2018-08-22 | End: 2018-08-22

## 2018-08-22 RX ORDER — SODIUM CHLORIDE 0.9 % (FLUSH) 0.9 %
5-10 SYRINGE (ML) INJECTION EVERY 8 HOURS
Status: DISCONTINUED | OUTPATIENT
Start: 2018-08-22 | End: 2018-08-24 | Stop reason: HOSPADM

## 2018-08-22 RX ORDER — MORPHINE SULFATE 1 MG/ML
1-10 INJECTION, SOLUTION EPIDURAL; INTRATHECAL; INTRAVENOUS AS NEEDED
Status: DISCONTINUED | OUTPATIENT
Start: 2018-08-22 | End: 2018-08-22

## 2018-08-22 RX ORDER — SODIUM CHLORIDE 9 MG/ML
1.5 INJECTION, SOLUTION INTRAVENOUS CONTINUOUS
Status: DISCONTINUED | OUTPATIENT
Start: 2018-08-22 | End: 2018-08-22 | Stop reason: HOSPADM

## 2018-08-22 RX ORDER — SODIUM CHLORIDE 9 MG/ML
1.5 INJECTION, SOLUTION INTRAVENOUS CONTINUOUS
Status: DISPENSED | OUTPATIENT
Start: 2018-08-22 | End: 2018-08-22

## 2018-08-22 RX ORDER — ATROPINE SULFATE 0.1 MG/ML
0.4 INJECTION INTRAVENOUS AS NEEDED
Status: DISCONTINUED | OUTPATIENT
Start: 2018-08-22 | End: 2018-08-22

## 2018-08-22 RX ORDER — SODIUM CHLORIDE 9 MG/ML
3 INJECTION, SOLUTION INTRAVENOUS CONTINUOUS
Status: DISCONTINUED | OUTPATIENT
Start: 2018-08-22 | End: 2018-08-22 | Stop reason: HOSPADM

## 2018-08-22 RX ORDER — POTASSIUM CHLORIDE 750 MG/1
40 TABLET, FILM COATED, EXTENDED RELEASE ORAL
Status: COMPLETED | OUTPATIENT
Start: 2018-08-22 | End: 2018-08-22

## 2018-08-22 RX ORDER — SODIUM CHLORIDE 0.9 % (FLUSH) 0.9 %
5-10 SYRINGE (ML) INJECTION AS NEEDED
Status: DISCONTINUED | OUTPATIENT
Start: 2018-08-22 | End: 2018-08-24 | Stop reason: HOSPADM

## 2018-08-22 RX ORDER — LIDOCAINE HYDROCHLORIDE 10 MG/ML
5-30 INJECTION INFILTRATION; PERINEURAL AS NEEDED
Status: DISCONTINUED | OUTPATIENT
Start: 2018-08-22 | End: 2018-08-22

## 2018-08-22 RX ORDER — HEPARIN SODIUM 200 [USP'U]/100ML
2000 INJECTION, SOLUTION INTRAVENOUS ONCE
Status: COMPLETED | OUTPATIENT
Start: 2018-08-22 | End: 2018-08-22

## 2018-08-22 RX ADMIN — LIDOCAINE HYDROCHLORIDE 10 ML: 10 INJECTION, SOLUTION INFILTRATION; PERINEURAL at 09:08

## 2018-08-22 RX ADMIN — FLECAINIDE ACETATE 100 MG: 100 TABLET ORAL at 17:11

## 2018-08-22 RX ADMIN — MAGNESIUM SULFATE IN DEXTROSE 1 G: 10 INJECTION, SOLUTION INTRAVENOUS at 13:06

## 2018-08-22 RX ADMIN — SODIUM CHLORIDE 1.5 ML/KG/HR: 900 INJECTION, SOLUTION INTRAVENOUS at 09:56

## 2018-08-22 RX ADMIN — AMLODIPINE BESYLATE 5 MG: 5 TABLET ORAL at 08:18

## 2018-08-22 RX ADMIN — DOXAZOSIN 1 MG: 1 TABLET ORAL at 08:18

## 2018-08-22 RX ADMIN — IOPAMIDOL 103 ML: 755 INJECTION, SOLUTION INTRAVENOUS at 09:16

## 2018-08-22 RX ADMIN — ASPIRIN 81 MG: 81 TABLET, COATED ORAL at 08:18

## 2018-08-22 RX ADMIN — Medication 2000 UNITS: at 09:00

## 2018-08-22 RX ADMIN — LISINOPRIL 20 MG: 20 TABLET ORAL at 08:18

## 2018-08-22 RX ADMIN — Medication 10 ML: at 22:07

## 2018-08-22 RX ADMIN — PANTOPRAZOLE SODIUM 40 MG: 40 TABLET, DELAYED RELEASE ORAL at 05:55

## 2018-08-22 RX ADMIN — Medication 2 MG: at 09:06

## 2018-08-22 RX ADMIN — LOVASTATIN 20 MG: 20 TABLET ORAL at 22:07

## 2018-08-22 RX ADMIN — SODIUM CHLORIDE 100 ML/HR: 900 INJECTION, SOLUTION INTRAVENOUS at 05:54

## 2018-08-22 RX ADMIN — SODIUM CHLORIDE 3 ML/KG/HR: 900 INJECTION, SOLUTION INTRAVENOUS at 08:55

## 2018-08-22 RX ADMIN — METRONIDAZOLE 500 MG: 500 INJECTION, SOLUTION INTRAVENOUS at 04:15

## 2018-08-22 RX ADMIN — METRONIDAZOLE 500 MG: 500 INJECTION, SOLUTION INTRAVENOUS at 16:02

## 2018-08-22 RX ADMIN — Medication 10 ML: at 14:00

## 2018-08-22 RX ADMIN — Medication 10 ML: at 05:54

## 2018-08-22 RX ADMIN — FLECAINIDE ACETATE 100 MG: 100 TABLET ORAL at 08:19

## 2018-08-22 RX ADMIN — POTASSIUM CHLORIDE 40 MEQ: 750 TABLET, EXTENDED RELEASE ORAL at 08:19

## 2018-08-22 NOTE — ROUTINE PROCESS
TRANSFER - OUT REPORT:    Verbal report given to Denzel Chicas RN (name) on Carmen Langley  being transferred to [facility] (unit) for routine progression of care       Report consisted of patients Situation, Background, Assessment and   Recommendations(SBAR). Information from the following report(s) SBAR, ED Summary, Intake/Output and Med Rec Status was reviewed with the receiving nurse. Lines:   Peripheral IV 08/21/18 Left Forearm (Active)   Site Assessment Clean, dry, & intact 8/21/2018  1:09 PM   Infiltration Assessment 0 8/21/2018  1:09 PM   Dressing Status Clean, dry, & intact 8/21/2018  1:09 PM        Opportunity for questions and clarification was provided.       Patient transported with:   Registered Nurse

## 2018-08-22 NOTE — PROGRESS NOTES
[facility] -   Cardiac Cath Lab Procedure Area Arrival Note:    [address] arrived to Cardiac Cath Lab, Procedure Area. Patient identifiers verified with NAME and DATE OF BIRTH. Procedure verified with patient. Consent forms verified. Allergies verified. Patient informed of procedure and plan of care. Questions answered with review. Patient voiced understanding of procedure and plan of care. Patient on cardiac monitor, non-invasive blood pressure, SPO2 monitor. Placed on O2 @ 2 lpm via NC.  IV of NS on pump at 152 ml/hr. Patient status doing well without problems. Patient is A&Ox 4. Patient reports no discomfort at this time. Patient medicated during procedure with orders obtained and verified by Dr. Corona Friedman. Refer to patients Cardiac Cath Lab PROCEDURE REPORT for vital signs, assessment, status, and response during procedure, printed at end of case. Printed report on chart or scanned into chart. [id number] - TRANSFER - OUT REPORT:    Verbal report given to [facility]) on [address]  being transferred to (unit) for routine post - op       Report consisted of patients Situation, Background, Assessment and   Recommendations(SBAR). Information from the following report(s) Procedure Summary and MAR was reviewed with the receiving nurse. Lines:   Peripheral IV 08/21/18 Left Forearm (Active)   Site Assessment Clean, dry, & intact 8/21/2018  1:09 PM   Infiltration Assessment 0 8/21/2018  1:09 PM   Dressing Status Clean, dry, & intact 8/21/2018  1:09 PM        Opportunity for questions and clarification was provided.       Patient transported with:   [employer]

## 2018-08-22 NOTE — PROGRESS NOTES
Cardiac Cath Lab Recovery Arrival Note:      Echo Peck arrived to Cardiac Cath Lab, Recovery Area. Staff introduced to patient. Patient identifiers verified with NAME and DATE OF BIRTH. Procedure verified with patient. Consent forms reviewed and signed by patient or authorized representative and verified. Allergies verified. Patient and family oriented to department. Patient and family informed of procedure and plan of care. Questions answered with review. Patient prepped for procedure, per orders from physician, prior to arrival.    Patient on cardiac monitor, non-invasive blood pressure, SPO2 monitor. On RA. Patient is A&Ox 4. Patient reports no pain. Patient in stretcher, in low position, with side rails up, call bell within reach, patient instructed to call if assistance as needed. Patient prep in: [address], [place] [address]. Patient family has pager #   Family in: . Prep by: V. Leopoldo Lawn RN

## 2018-08-22 NOTE — PROGRESS NOTES
Medical Progress Note      NAME: Jing Fuller   :  8/10/1926  MRM:  [id number]    Date/Time: 2018           Problem List:     Active Problems:    Abdominal pain (12/10/2011)      Overview: LLQ , , thought due to intestinal spasm      Elevated troponin (2018)             Subjective:     Denies chest pain , sob, dizziness, abdominal pain. Last bm yesterday    Past Medical History:   Diagnosis Date    Acute ischemic colitis ([facility]     Arrhythmia     Paroxysmal  Afib,  SSS    Carotid stenosis, bilateral     moderate , right >left    GERD (gastroesophageal reflux disease) 12/10/2011    HTN (hypertension) 2011    HX OTHER MEDICAL     cardiac cath ;non obstructive CAD. Renal artery stents; open    HX OTHER MEDICAL     LLQ abd. pain thought due to spasm.  Hypercholesterolemia     Hypertension     Osteoporosis     Osteoporosis 2011    PAF (paroxysmal atrial fibrillation) ([facility] 2011    PVD (peripheral vascular disease) ([facility]     Rhinitis 2011    SSS (sick sinus syndrome) ([facility] 2011            Objective:         Vitals:      Last 24hrs VS reviewed since prior progress note. Most recent are:    Visit Vitals    /64 (BP 1 Location: Left arm, BP Patient Position: At rest)    Pulse 79    Temp 97.9 °F (36.6 °C)    Resp 20    Ht 5' 5\" (1.651 m)    Wt 111 lb 5.3 oz (50.5 kg)    SpO2 99%    BMI 18.53 kg/m2     SpO2 Readings from Last 6 Encounters:   18 99%   18 94%   18 98%   01/15/18 98%   17 98%   17 99%        No intake or output data in the 24 hours ending 18 0740               Exam:      General:  Alert, cooperative, no distress, appears stated age. Lungs:   Clear to auscultation bilaterally. Heart:  Regular rate and rhythm, S1, S2 normal, 2/6 systolic murmur, no click, rub or gallop. Abdomen:   Soft, diffuse minor abdominal tenderness w/o rebound . Bowel sounds normal. No masses,  No organomegaly. Extremities: 2+ PT pulses . No pedal  edema. Lab Data Reviewed: (see below)  Recent Results (from the past 24 hour(s))   EKG, 12 LEAD, INITIAL    Collection Time: 08/21/18  1:21 PM   Result Value Ref Range    Ventricular Rate 71 BPM    Atrial Rate 73 BPM    P-R Interval 158 ms    QRS Duration 120 ms    Q-T Interval 436 ms    QTC Calculation (Bezet) 473 ms    Calculated P Axis 62 degrees    Calculated R Axis -48 degrees    Calculated T Axis -66 degrees    Diagnosis       Sinus rhythm with occasional and consecutive premature ventricular complexes  Left anterior fascicular block  Anteroseptal infarct , age undetermined  ST & T wave abnormality, consider lateral ischemia  When compared with ECG of 25-APR-2017 15:37,  QRS duration has decreased  T wave inversion now evident in Inferior leads  T wave inversion more evident in Lateral leads  Confirmed by Jenn Fu M.D., Jennifer Cantor ([id number]) on 8/21/2018 3:46:35 PM     LACTIC ACID    Collection Time: 08/21/18  1:24 PM   Result Value Ref Range    Lactic acid 2.2 (HH) 0.4 - 2.0 MMOL/L   SAMPLES BEING HELD    Collection Time: 08/21/18  1:24 PM   Result Value Ref Range    SAMPLES BEING HELD 1RED 1BLUE     COMMENT        Add-on orders for these samples will be processed based on acceptable specimen integrity and analyte stability, which may vary by analyte. CBC WITH AUTOMATED DIFF    Collection Time: 08/21/18  1:24 PM   Result Value Ref Range    WBC 7.7 3.6 - 11.0 K/uL    RBC 4.32 3.80 - 5.20 M/uL    HGB 13.0 11.5 - 16.0 g/dL    HCT 38.8 35.0 - 47.0 %    MCV 89.8 80.0 - 99.0 FL    MCH 30.1 26.0 - 34.0 PG    MCHC 33.5 30.0 - 36.5 g/dL    RDW 12.4 11.5 - 14.5 %    PLATELET 617 422 - 034 K/uL    MPV 10.6 8.9 - 12.9 FL    NRBC 0.0 0  WBC    ABSOLUTE NRBC 0.00 0.00 - 0.01 K/uL    NEUTROPHILS 65 32 - 75 %    LYMPHOCYTES 24 12 - 49 %    MONOCYTES 9 5 - 13 %    EOSINOPHILS 1 0 - 7 %    BASOPHILS 1 0 - 1 %    IMMATURE GRANULOCYTES 0 0.0 - 0.5 %    ABS.  NEUTROPHILS 5.0 1.8 - 8.0 K/UL    ABS. LYMPHOCYTES 1.8 0.8 - 3.5 K/UL    ABS. MONOCYTES 0.7 0.0 - 1.0 K/UL    ABS. EOSINOPHILS 0.1 0.0 - 0.4 K/UL    ABS. BASOPHILS 0.1 0.0 - 0.1 K/UL    ABS. IMM. GRANS. 0.0 0.00 - 0.04 K/UL    DF AUTOMATED     METABOLIC PANEL, COMPREHENSIVE    Collection Time: 08/21/18  1:24 PM   Result Value Ref Range    Sodium 139 136 - 145 mmol/L    Potassium 3.1 (L) 3.5 - 5.1 mmol/L    Chloride 104 97 - 108 mmol/L    CO2 24 21 - 32 mmol/L    Anion gap 11 5 - 15 mmol/L    Glucose 128 (H) 65 - 100 mg/dL    BUN 10 6 - 20 MG/DL    Creatinine 0.90 0.55 - 1.02 MG/DL    BUN/Creatinine ratio 11 (L) 12 - 20      GFR est AA >60 >60 ml/min/1.73m2    GFR est non-AA 59 (L) >60 ml/min/1.73m2    Calcium 8.4 (L) 8.5 - 10.1 MG/DL    Bilirubin, total 0.4 0.2 - 1.0 MG/DL    ALT (SGPT) 37 12 - 78 U/L    AST (SGOT) 58 (H) 15 - 37 U/L    Alk. phosphatase 74 45 - 117 U/L    Protein, total 7.1 6.4 - 8.2 g/dL    Albumin 3.6 3.5 - 5.0 g/dL    Globulin 3.5 2.0 - 4.0 g/dL    A-G Ratio 1.0 (L) 1.1 - 2.2     URINALYSIS W/MICROSCOPIC    Collection Time: 08/21/18  1:24 PM   Result Value Ref Range    Color YELLOW/STRAW      Appearance CLEAR CLEAR      Specific gravity 1.007 1.003 - 1.030      pH (UA) 7.0 5.0 - 8.0      Protein NEGATIVE  NEG mg/dL    Glucose NEGATIVE  NEG mg/dL    Ketone NEGATIVE  NEG mg/dL    Bilirubin NEGATIVE  NEG      Blood NEGATIVE  NEG      Urobilinogen 0.2 0.2 - 1.0 EU/dL    Nitrites NEGATIVE  NEG      Leukocyte Esterase TRACE (A) NEG      WBC 0-4 0 - 4 /hpf    RBC 0-5 0 - 5 /hpf    Epithelial cells FEW FEW /lpf    Bacteria NEGATIVE  NEG /hpf    Hyaline cast 2-5 0 - 5 /lpf   URINE CULTURE HOLD SAMPLE    Collection Time: 08/21/18  1:24 PM   Result Value Ref Range    Urine culture hold        URINE ON HOLD IN MICROBIOLOGY DEPT FOR 3 DAYS. IF UNPRESERVED URINE IS SUBMITTED, IT CANNOT BE USED FOR ADDITIONAL TESTING AFTER 24 HRS, RECOLLECTION WILL BE REQUIRED.    OCCULT BLOOD, STOOL    Collection Time: 08/21/18  1:24 PM   Result Value Ref Range    Occult blood, stool NEGATIVE  NEG     TROPONIN I    Collection Time: 08/21/18  1:24 PM   Result Value Ref Range    Troponin-I, Qt. 0.13 (H) <0.05 ng/mL   MAGNESIUM    Collection Time: 08/21/18  1:24 PM   Result Value Ref Range    Magnesium 1.5 (L) 1.6 - 2.4 mg/dL   CK W/ REFLX CKMB    Collection Time: 08/21/18  1:24 PM   Result Value Ref Range     26 - 192 U/L   POC VENOUS BLOOD GAS    Collection Time: 08/21/18  2:36 PM   Result Value Ref Range    Device: ROOM AIR      FIO2 (POC) 0.21 %    pO2, venous (POC) 113 (H) 25 - 40 mmHg    Allens test (POC) N/A      Site OTHER      Specimen type (POC) VENOUS BLOOD     POC VENOUS BLOOD GAS    Collection Time: 08/21/18  3:09 PM   Result Value Ref Range    Device: ROOM AIR      FIO2 (POC) 0.21 %    pH, venous (POC) 7.398 7.32 - 7.42      pCO2, venous (POC) 28.1 (L) 41 - 51 MMHG    pO2, venous (POC) 37 25 - 40 mmHg    HCO3, venous (POC) 17.3 (L) 23.0 - 28.0 MMOL/L    sO2, venous (POC) 72 65 - 88 %    Base deficit, venous (POC) 8 mmol/L    Allens test (POC) N/A      Site OTHER      Specimen type (POC) VENOUS BLOOD     METABOLIC PANEL, BASIC    Collection Time: 08/22/18  2:13 AM   Result Value Ref Range    Sodium 139 136 - 145 mmol/L    Potassium 3.2 (L) 3.5 - 5.1 mmol/L    Chloride 106 97 - 108 mmol/L    CO2 25 21 - 32 mmol/L    Anion gap 8 5 - 15 mmol/L    Glucose 78 65 - 100 mg/dL    BUN 6 6 - 20 MG/DL    Creatinine 0.54 (L) 0.55 - 1.02 MG/DL    BUN/Creatinine ratio 11 (L) 12 - 20      GFR est AA >60 >60 ml/min/1.73m2    GFR est non-AA >60 >60 ml/min/1.73m2    Calcium 8.0 (L) 8.5 - 10.1 MG/DL   TROPONIN I    Collection Time: 08/22/18  2:13 AM   Result Value Ref Range    Troponin-I, Qt. 0.25 (H) <0.05 ng/mL   CBC WITH AUTOMATED DIFF    Collection Time: 08/22/18  2:13 AM   Result Value Ref Range    WBC 8.8 3.6 - 11.0 K/uL    RBC 4.18 3.80 - 5.20 M/uL    HGB 12.8 11.5 - 16.0 g/dL    HCT 38.2 35.0 - 47.0 %    MCV 91.4 80.0 - 99.0 FL    MCH 30.6 26.0 - 34.0 PG MCHC 33.5 30.0 - 36.5 g/dL    RDW 12.7 11.5 - 14.5 %    PLATELET 746 389 - 193 K/uL    MPV 10.8 8.9 - 12.9 FL    NRBC 0.0 0  WBC    ABSOLUTE NRBC 0.00 0.00 - 0.01 K/uL    NEUTROPHILS 71 32 - 75 %    LYMPHOCYTES 17 12 - 49 %    MONOCYTES 10 5 - 13 %    EOSINOPHILS 2 0 - 7 %    BASOPHILS 1 0 - 1 %    IMMATURE GRANULOCYTES 0 0.0 - 0.5 %    ABS. NEUTROPHILS 6.2 1.8 - 8.0 K/UL    ABS. LYMPHOCYTES 1.5 0.8 - 3.5 K/UL    ABS. MONOCYTES 0.8 0.0 - 1.0 K/UL    ABS. EOSINOPHILS 0.2 0.0 - 0.4 K/UL    ABS. BASOPHILS 0.1 0.0 - 0.1 K/UL    ABS. IMM. GRANS. 0.0 0.00 - 0.04 K/UL    DF AUTOMATED         Medications Reviewed: (see below)    ______________________________________________________________________    Medications:     Current Facility-Administered Medications   Medication Dose Route Frequency    magnesium sulfate 1 g/100 ml IVPB (premix or compounded)  1 g IntraVENous ONCE    potassium chloride SR (KLOR-CON 10) tablet 40 mEq  40 mEq Oral NOW    0.9% sodium chloride infusion  100 mL/hr IntraVENous CONTINUOUS    amLODIPine (NORVASC) tablet 2.5 mg  2.5 mg Oral PRN    amLODIPine (NORVASC) tablet 5 mg  5 mg Oral DAILY    aspirin delayed-release tablet 81 mg  81 mg Oral DAILY    doxazosin (CARDURA) tablet 1 mg  1 mg Oral DAILY    flecainide (TAMBOCOR) tablet 100 mg  100 mg Oral BID    lisinopril (PRINIVIL, ZESTRIL) tablet 20 mg  20 mg Oral DAILY    LORazepam (ATIVAN) tablet 0.5 mg  0.5 mg Oral BID PRN    lovastatin (MEVACOR) tablet 20 mg  20 mg Oral QHS    ondansetron (ZOFRAN ODT) tablet 4 mg  4 mg Oral Q6H PRN    pantoprazole (PROTONIX) tablet 40 mg  40 mg Oral ACB    sodium chloride (NS) flush 5-10 mL  5-10 mL IntraVENous Q8H    sodium chloride (NS) flush 5-10 mL  5-10 mL IntraVENous PRN    metroNIDAZOLE (FLAGYL) IVPB premix 500 mg  500 mg IntraVENous Q12H    morphine injection 1 mg  1 mg IntraVENous Q4H PRN                   Assessment:   Hx chest pain and elevated Troponin I and abnormal EKG.  D/w'd  Ro . Plan is for cath   Abdominal pain may be related to heart . Flagyl also continues   Replete K+ further.  Replete Mag.    labile HTN under tx     Patient Active Problem List   Diagnosis Code    HTN (hypertension) I10    SSS (sick sinus syndrome) ([facility]) I49.5    PAF (paroxysmal atrial fibrillation) ([facility]) I48.0    Hypercholesterolemia E78.00    Osteoporosis M81.0    Rhinitis J31.0    Carotid stenosis, bilateral I65.23    GERD (gastroesophageal reflux disease) K21.9    Abdominal pain R10.9    Peripheral vascular disease ([facility]) I73.9    Ischemic colitis ([facility]) K55.9    Gastritis K29.70    Diverticulitis of colon (without mention of hemorrhage)(562.11) K57.32    Diarrhea R19.7    Colitis K52.9    Volume depletion, gastrointestinal loss E86.9    Abdominal pain, acute, generalized R10.84    Left sided colitis with rectal bleeding ([facility]) K51.511    Thrush, oral B37.0    Fever and chills R50.9    Acute respiratory failure with hypoxemia ([facility]) J96.01    Vasovagal near syncope R55    Non-sustained ventricular tachycardia ([facility]) I47.2    UTI (lower urinary tract infection) N39.0    Unsteady gait R26.81    VBI (vertebrobasilar insufficiency) G45.0    Labile essential hypertension I10    Functional gait abnormality R26.89    PVCs (premature ventricular contractions) I49.3    Syncope R55    Acute colitis K52.9    Elevated troponin R74.8          Plan:     I left msg for g dtr [place]                                                       ___________________________________________________      Attending Physician: Meg Kenny MD

## 2018-08-22 NOTE — CDMP QUERY
Please clarify if this patient is (was) being treated/managed for: Hypokalemia POA in the setting of Potassium levels 3.1/3.2 requiring Potassium 10 mEq IV, Potassium 40 mEq PO, lab monitoring.     => Other explanation of clinical findings  => Clinically Undetermined (no explanation for clinical findings)    The medical record reflects the following clinical findings, treatment, and risk factors. Risk Factors:  Abdominal pain    Clinical Indicators:    Potassium levels 3.1/3.2    Treatment: Potassium 10 mEq IV, Potassium 40 mEq PO, lab monitoring     Please clarify and document your clinical opinion in the progress notes and discharge summary including the definitive and/or presumptive diagnosis, (suspected or probable), related to the above clinical findings. Please include clinical findings supporting your diagnosis.     Thank you  Katelyn Kruger  [facility]  [phone]

## 2018-08-22 NOTE — PROGRESS NOTES
TRANSFER - OUT REPORT:    Verbal report given to Francia on [address] being transferred to Neuro room  672   for routine progression of care       Report consisted of patients Situation, Background, Assessment and   Recommendations(SBAR). Information from the following report(s) Kardex and Procedure Summary was reviewed with the receiving nurse. Opportunity for questions and clarification was provided.

## 2018-08-22 NOTE — CDMP QUERY
Patient is noted to have a BMI of 18.53. Please clarify if this patient is:     =>Underweight  =>Cachexia  =>Failure to Thrive  =>Other explanation of clinical findings  =>Clinically Undetermined (no explanation for clinical findings)    Presentation:   BMI: 18.53  Ht. 5'5\"  Wt. 111 Lbs. Please clarify and document your clinical opinion in the progress notes and discharge summary, including the definitive and or presumptive diagnosis, (suspected or probable), related to the above clinical findings. Please include clinical findings supporting your diagnosis.      Thank you  Aurea Calvillo  [facility]  [phone]

## 2018-08-22 NOTE — PROGRESS NOTES
TRANSFER - IN REPORT:    Verbal report received from [facility] on [address]  being received from cath lab procedure area for routine progression of care. Report consisted of patients Situation, Background, Assessment and Recommendations(SBAR). Information from the following report(s) Kardex and Procedure Summary was reviewed with the receiving clinician. Opportunity for questions and clarification was provided. Assessment completed upon patients arrival to [address] [place] [address] and care assumed. Cardiac Cath Lab Recovery Arrival Note:    [address] arrived to [facility] recovery area. Patient procedure= LHC. Patient on cardiac monitor, non-invasive blood pressure, SPO2 monitor. On  O2 @ 2 lpm via NC.  IV  of NS on pump at 76 ml/hr. Patient status doing well without problems. Patient is A&Ox 3. Patient reports no pain. PROCEDURE SITE CHECK:    Procedure site:without any bleeding and no hematoma, No pain/discomfort reported at procedure site. No change in patient status. Continue to monitor patient and status.

## 2018-08-22 NOTE — CONSULTS
Cardiology Consult Note    CC: CP  Reason for consult:  Elevated troponin/abnormal ekg  Requesting MD:  Dr. Bessy Crowell     Subjective:      Date of  Admission: 8/21/2018  1:08 PM     Admission type:Emergency    Ken Mitchell is a 80 y.o. female admitted for Abdominal pain  Elevated troponin. Patient complains of vague Rt side abdominal pain which resolved but she felt tightness like someone pressing on her chest which started in SS but radiated to Rt side and then down to RUQ. It made her sick briefly and then weak. Her abdominal CT showed no abnormality but troponins were elevated and ekg shows TW inversions and ST depression in inferior/lateral leads, suggestive ischemia. Her CRFs are PVD( stents in renal and Lt iliac), age, HTN, and hyperlipidemia. In addition, she has SSS and PAF.      Patient Active Problem List    Diagnosis Date Noted    Elevated troponin 08/21/2018    Acute colitis 01/13/2018    Syncope 04/25/2017    PVCs (premature ventricular contractions) 04/14/2017    Vasovagal near syncope 10/14/2015    Non-sustained ventricular tachycardia ([address] 10/14/2015    UTI (lower urinary tract infection) 10/14/2015    Unsteady gait 10/14/2015    VBI (vertebrobasilar insufficiency) 10/14/2015    Labile essential hypertension 10/14/2015    Functional gait abnormality 10/14/2015    Fever and chills 04/28/2015    Acute respiratory failure with hypoxemia ([address] 04/28/2015    Thrush, oral 03/07/2015    Left sided colitis with rectal bleeding ([address] 03/05/2015    Diarrhea 03/04/2015    Colitis 03/04/2015    Volume depletion, gastrointestinal loss 03/04/2015    Abdominal pain, acute, generalized 03/04/2015    Diverticulitis of colon (without mention of hemorrhage)(562.11) 10/22/2013    Ischemic colitis ([address] 10/21/2013    Gastritis 10/21/2013    Peripheral vascular disease ([address] 06/06/2012    Carotid stenosis, bilateral 12/10/2011    GERD (gastroesophageal reflux disease) 12/10/2011    Abdominal pain 12/10/2011    HTN (hypertension) 12/08/2011    SSS (sick sinus syndrome) ([facility] 12/08/2011    PAF (paroxysmal atrial fibrillation) ([facility] 12/08/2011    Hypercholesterolemia 12/08/2011    Osteoporosis 12/08/2011    Rhinitis 12/08/2011      KIMBERLY Loja MD  Past Medical History:   Diagnosis Date    [facility]     Arrhythmia     Paroxysmal  Afib,  SSS    Carotid stenosis, bilateral     moderate , right >left    GERD (gastroesophageal reflux disease) 12/10/2011    HTN (hypertension) 12/8/2011    HX OTHER MEDICAL 2011    cardiac cath ;non obstructive CAD. Renal artery stents; open    HX OTHER MEDICAL 2011    LLQ abd. pain thought due to spasm.  Hypercholesterolemia     Hypertension     Osteoporosis     Osteoporosis 12/8/2011    PAF (paroxysmal atrial fibrillation) ([facility] 12/8/2011    PVD [facility]peripheral vascular disease) [facility]     Rhinitis 12/8/2011    SSS (sick sinus syndrome) ([facility] 12/8/2011      Past Surgical History:   Procedure Laterality Date    ENDOSCOPY, COLON, DIAGNOSTIC  3/2/11    diverticulosis,hemorrhoids    HX CHOLECYSTECTOMY      HX HYSTERECTOMY      1960    HX OTHER SURGICAL      stent left leg    HX TONSILLECTOMY       Allergies   Allergen Reactions    Beta Blocker [Beta-Blockers (Beta-Adrenergic Blocking Agts)] Other (comments)     Sick sinus syndrome      Amoxicillin Nausea and Vomiting    Codeine Nausea Only    Donnatal [Phenobarb-Hyoscy-Atropine-Scop] Unknown (comments)    Exelon [Rivastigmine] Other (comments)     Nausea     Other Medication Other (comments)     Pneumovax 23 .  Arm swelling     Plavix [Clopidogrel] Rash    Sular [Nisoldipine] Vertigo      Family History   Problem Relation Age of Onset    Hypertension Mother       Current Facility-Administered Medications   Medication Dose Route Frequency    0.9% sodium chloride infusion  100 mL/hr IntraVENous CONTINUOUS    amLODIPine (NORVASC) tablet 2.5 mg  2.5 mg Oral PRN    amLODIPine (NORVASC) tablet 5 mg  5 mg Oral DAILY    aspirin delayed-release tablet 81 mg  81 mg Oral DAILY    doxazosin (CARDURA) tablet 1 mg  1 mg Oral DAILY    flecainide (TAMBOCOR) tablet 100 mg  100 mg Oral BID    lisinopril (PRINIVIL, ZESTRIL) tablet 20 mg  20 mg Oral DAILY    LORazepam (ATIVAN) tablet 0.5 mg  0.5 mg Oral BID PRN    lovastatin (MEVACOR) tablet 20 mg  20 mg Oral QHS    ondansetron (ZOFRAN ODT) tablet 4 mg  4 mg Oral Q6H PRN    pantoprazole (PROTONIX) tablet 40 mg  40 mg Oral ACB    sodium chloride (NS) flush 5-10 mL  5-10 mL IntraVENous Q8H    sodium chloride (NS) flush 5-10 mL  5-10 mL IntraVENous PRN    metroNIDAZOLE (FLAGYL) IVPB premix 500 mg  500 mg IntraVENous Q12H    morphine injection 1 mg  1 mg IntraVENous Q4H PRN        Prior to Admission Medications:  Prior to Admission medications    Medication Sig Start Date End Date Taking? Authorizing Provider   metroNIDAZOLE (FLAGYL) 500 mg tablet Take 500 mg by mouth three (3) times daily. Indications: Infectious Disease of Abdomen 8/18/18 8/23/18 Yes Historical Provider   HYDROcodone-acetaminophen (NORCO) 5-325 mg per tablet Take 1 Tab by mouth two (2) times daily as needed for Pain. Max Daily Amount: 2 Tabs. Indications: Pain 8/16/18  Yes Louie Ramon IV, MD   doxazosin (CARDURA) 1 mg tablet TAKE ONE-HALF (1/2) TABLET AT BEDTIME 6/30/18  Yes Louie Ramon IV, MD   amLODIPine (NORVASC) 2.5 mg tablet Take 1 Tab by mouth as needed (SBP > 160). 3/19/18  Yes Louie Ramon IV, MD   LORazepam (ATIVAN) 0.5 mg tablet Take 1 Tab by mouth two (2) times daily as needed for Anxiety.  Max Daily Amount: 1 mg. 3/9/18  Yes Louie Ramon IV, MD   amLODIPine (NORVASC) 5 mg tablet TAKE 1 TABLET DAILY 8/13/17  Yes Louie Ramon IV, MD   lovastatin (MEVACOR) 20 mg tablet TAKE 1 TABLET DAILY 8/2/17  Yes Louie Ramon IV, MD   lisinopril (PRINIVIL, ZESTRIL) 20 mg tablet TAKE 1 TABLET DAILY 7/2/17  Yes Malathi Pedersen MD aspirin delayed-release 81 mg tablet Take 81 mg by mouth daily. Yes Historical Provider   flecainide (TAMBOCOR) 100 mg tablet Take 100 mg by mouth two (2) times a day. Yes Historical Provider   fluticasone (FLONASE) 50 mcg/actuation nasal spray 2 Sprays by Both Nostrils route as needed for Rhinitis. Yes Historical Provider   loratadine (CLARITIN) 10 mg tablet Take 10 mg by mouth daily as needed. Yes Historical Provider   ondansetron (ZOFRAN ODT) 4 mg disintegrating tablet Take 1 Tab by mouth every six (6) hours as needed for Nausea. 18   Katty Rodriguez IV, MD   pantoprazole (PROTONIX) 40 mg tablet TAKE 1 TABLET DAILY 18   Katty Rodriguez IV, MD   naloxone [facility]) 2 mg/actuation spry 1 spray  into 1 nostril. Use a new one for next spray. put  into alternating nostrils. May repeat every 2 to 3 minutes as needed. 3/19/18   Katty Rodriguez IV, MD   sodium chloride (OCEAN) 0.65 % nasal spray 2 Sprays by Both Nostrils route every two (2) hours as needed for Congestion. 1/15/18   Katty Rodriguez IV, MD   polyethylene glycol (MIRALAX) 17 gram/dose powder Take 17 g by mouth daily. As needed    Historical Provider        Review of Symptoms:  Except as noted in HPI, patient denies recent fever or chills, nausea, vomiting, diarrhea, hemoptysis, hematemesis, dysuria, myalgias, focal neurologic symptoms, ecchymosis, angioedema, odynophagia, dysphagia, sore throat, earache,rash, melena, hematochezia, depression, GERD, cold intolerance, petechia, bleeding gums, or significant weight loss. A comprehensive review of systems was negative except for that written in the HPI.      Subjective:    24 hr VS reviewed, overall VSSAF  Temp (24hrs), Av.2 °F (36.8 °C), Min:97.7 °F (36.5 °C), Max:98.6 °F (37 °C)    Patient Vitals for the past 8 hrs:   Pulse   18 0656 79   18 0300 84   18 2349 83    Patient Vitals for the past 8 hrs:   Resp   18 0656 20   18 0300 16 08/21/18 2349 20    Patient Vitals for the past 8 hrs:   BP   08/22/18 0656 165/64   08/22/18 0300 184/71   08/21/18 2349 176/62        No intake or output data in the 24 hours ending 08/22/18 0733      Physical Exam (complete single organ system exam)    Visit Vitals    /64 (BP 1 Location: Left arm, BP Patient Position: At rest)    Pulse 79    Temp 97.9 °F (36.6 °C)    Resp 20    Ht 5' 5\" (1.651 m)    Wt 50.5 kg (111 lb 5.3 oz)    SpO2 99%    BMI 18.53 kg/m2     General Appearance:  Well developed, well nourished,alert and oriented x 3, and individual in no acute distress. Ears/Nose/Mouth/Throat:   Hearing grossly normal.         Neck: Supple. Chest:   Lungs clear to auscultation bilaterally. Cardiovascular:  Regular rate and rhythm, S1, S2 normal, no murmur. Abdomen:   Soft, non-tender, bowel sounds are active. Extremities: No edema bilaterally. Skin: Warm and dry.                Cardiographics    Telemetry: normal sinus rhythm  ECG: normal EKG, normal sinus rhythm, unchanged from previous tracings  Echocardiogram: Not done    Labs:   Recent Results (from the past 24 hour(s))   EKG, 12 LEAD, INITIAL    Collection Time: 08/21/18  1:21 PM   Result Value Ref Range    Ventricular Rate 71 BPM    Atrial Rate 73 BPM    P-R Interval 158 ms    QRS Duration 120 ms    Q-T Interval 436 ms    QTC Calculation (Bezet) 473 ms    Calculated P Axis 62 degrees    Calculated R Axis -48 degrees    Calculated T Axis -66 degrees    Diagnosis       Sinus rhythm with occasional and consecutive premature ventricular complexes  Left anterior fascicular block  Anteroseptal infarct , age undetermined  ST & T wave abnormality, consider lateral ischemia  When compared with ECG of 25-APR-2017 15:37,  QRS duration has decreased  T wave inversion now evident in Inferior leads  T wave inversion more evident in Lateral leads  Confirmed by Kapil Hernandez M.D., [place] ([id number]) on 8/21/2018 3:46:35 PM     LACTIC ACID    Collection Time: 08/21/18  1:24 PM   Result Value Ref Range    Lactic acid 2.2 (HH) 0.4 - 2.0 MMOL/L   SAMPLES BEING HELD    Collection Time: 08/21/18  1:24 PM   Result Value Ref Range    SAMPLES BEING HELD 1RED 1BLUE     COMMENT        Add-on orders for these samples will be processed based on acceptable specimen integrity and analyte stability, which may vary by analyte. CBC WITH AUTOMATED DIFF    Collection Time: 08/21/18  1:24 PM   Result Value Ref Range    WBC 7.7 3.6 - 11.0 K/uL    RBC 4.32 3.80 - 5.20 M/uL    HGB 13.0 11.5 - 16.0 g/dL    HCT 38.8 35.0 - 47.0 %    MCV 89.8 80.0 - 99.0 FL    MCH 30.1 26.0 - 34.0 PG    MCHC 33.5 30.0 - 36.5 g/dL    RDW 12.4 11.5 - 14.5 %    PLATELET 269 416 - 870 K/uL    MPV 10.6 8.9 - 12.9 FL    NRBC 0.0 0  WBC    ABSOLUTE NRBC 0.00 0.00 - 0.01 K/uL    NEUTROPHILS 65 32 - 75 %    LYMPHOCYTES 24 12 - 49 %    MONOCYTES 9 5 - 13 %    EOSINOPHILS 1 0 - 7 %    BASOPHILS 1 0 - 1 %    IMMATURE GRANULOCYTES 0 0.0 - 0.5 %    ABS. NEUTROPHILS 5.0 1.8 - 8.0 K/UL    ABS. LYMPHOCYTES 1.8 0.8 - 3.5 K/UL    ABS. MONOCYTES 0.7 0.0 - 1.0 K/UL    ABS. EOSINOPHILS 0.1 0.0 - 0.4 K/UL    ABS. BASOPHILS 0.1 0.0 - 0.1 K/UL    ABS. IMM. GRANS. 0.0 0.00 - 0.04 K/UL    DF AUTOMATED     METABOLIC PANEL, COMPREHENSIVE    Collection Time: 08/21/18  1:24 PM   Result Value Ref Range    Sodium 139 136 - 145 mmol/L    Potassium 3.1 (L) 3.5 - 5.1 mmol/L    Chloride 104 97 - 108 mmol/L    CO2 24 21 - 32 mmol/L    Anion gap 11 5 - 15 mmol/L    Glucose 128 (H) 65 - 100 mg/dL    BUN 10 6 - 20 MG/DL    Creatinine 0.90 0.55 - 1.02 MG/DL    BUN/Creatinine ratio 11 (L) 12 - 20      GFR est AA >60 >60 ml/min/1.73m2    GFR est non-AA 59 (L) >60 ml/min/1.73m2    Calcium 8.4 (L) 8.5 - 10.1 MG/DL    Bilirubin, total 0.4 0.2 - 1.0 MG/DL    ALT (SGPT) 37 12 - 78 U/L    AST (SGOT) 58 (H) 15 - 37 U/L    Alk.  phosphatase 74 45 - 117 U/L    Protein, total 7.1 6.4 - 8.2 g/dL    Albumin 3.6 3.5 - 5.0 g/dL    Globulin 3.5 2.0 - 4.0 g/dL    A-G Ratio 1.0 (L) 1.1 - 2.2     URINALYSIS W/MICROSCOPIC    Collection Time: 08/21/18  1:24 PM   Result Value Ref Range    Color YELLOW/STRAW      Appearance CLEAR CLEAR      Specific gravity 1.007 1.003 - 1.030      pH (UA) 7.0 5.0 - 8.0      Protein NEGATIVE  NEG mg/dL    Glucose NEGATIVE  NEG mg/dL    Ketone NEGATIVE  NEG mg/dL    Bilirubin NEGATIVE  NEG      Blood NEGATIVE  NEG      Urobilinogen 0.2 0.2 - 1.0 EU/dL    Nitrites NEGATIVE  NEG      Leukocyte Esterase TRACE (A) NEG      WBC 0-4 0 - 4 /hpf    RBC 0-5 0 - 5 /hpf    Epithelial cells FEW FEW /lpf    Bacteria NEGATIVE  NEG /hpf    Hyaline cast 2-5 0 - 5 /lpf   URINE CULTURE HOLD SAMPLE    Collection Time: 08/21/18  1:24 PM   Result Value Ref Range    Urine culture hold        URINE ON HOLD IN MICROBIOLOGY DEPT FOR 3 DAYS. IF UNPRESERVED URINE IS SUBMITTED, IT CANNOT BE USED FOR ADDITIONAL TESTING AFTER 24 HRS, RECOLLECTION WILL BE REQUIRED.    OCCULT BLOOD, STOOL    Collection Time: 08/21/18  1:24 PM   Result Value Ref Range    Occult blood, stool NEGATIVE  NEG     TROPONIN I    Collection Time: 08/21/18  1:24 PM   Result Value Ref Range    Troponin-I, Qt. 0.13 (H) <0.05 ng/mL   MAGNESIUM    Collection Time: 08/21/18  1:24 PM   Result Value Ref Range    Magnesium 1.5 (L) 1.6 - 2.4 mg/dL   CK W/ REFLX CKMB    Collection Time: 08/21/18  1:24 PM   Result Value Ref Range     26 - 192 U/L   POC VENOUS BLOOD GAS    Collection Time: 08/21/18  2:36 PM   Result Value Ref Range    Device: ROOM AIR      FIO2 (POC) 0.21 %    pO2, venous (POC) 113 (H) 25 - 40 mmHg    Allens test (POC) N/A      Site OTHER      Specimen type (POC) VENOUS BLOOD     POC VENOUS BLOOD GAS    Collection Time: 08/21/18  3:09 PM   Result Value Ref Range    Device: ROOM AIR      FIO2 (POC) 0.21 %    pH, venous (POC) 7.398 7.32 - 7.42      pCO2, venous (POC) 28.1 (L) 41 - 51 MMHG    pO2, venous (POC) 37 25 - 40 mmHg    HCO3, venous (POC) 17.3 (L) 23.0 - 28.0 MMOL/L    sO2, venous (POC) 72 65 - 88 %    Base deficit, venous (POC) 8 mmol/L    Allens test (POC) N/A      Site OTHER      Specimen type (POC) VENOUS BLOOD     METABOLIC PANEL, BASIC    Collection Time: 08/22/18  2:13 AM   Result Value Ref Range    Sodium 139 136 - 145 mmol/L    Potassium 3.2 (L) 3.5 - 5.1 mmol/L    Chloride 106 97 - 108 mmol/L    CO2 25 21 - 32 mmol/L    Anion gap 8 5 - 15 mmol/L    Glucose 78 65 - 100 mg/dL    BUN 6 6 - 20 MG/DL    Creatinine 0.54 (L) 0.55 - 1.02 MG/DL    BUN/Creatinine ratio 11 (L) 12 - 20      GFR est AA >60 >60 ml/min/1.73m2    GFR est non-AA >60 >60 ml/min/1.73m2    Calcium 8.0 (L) 8.5 - 10.1 MG/DL   TROPONIN I    Collection Time: 08/22/18  2:13 AM   Result Value Ref Range    Troponin-I, Qt. 0.25 (H) <0.05 ng/mL   CBC WITH AUTOMATED DIFF    Collection Time: 08/22/18  2:13 AM   Result Value Ref Range    WBC 8.8 3.6 - 11.0 K/uL    RBC 4.18 3.80 - 5.20 M/uL    HGB 12.8 11.5 - 16.0 g/dL    HCT 38.2 35.0 - 47.0 %    MCV 91.4 80.0 - 99.0 FL    MCH 30.6 26.0 - 34.0 PG    MCHC 33.5 30.0 - 36.5 g/dL    RDW 12.7 11.5 - 14.5 %    PLATELET 183 505 - 965 K/uL    MPV 10.8 8.9 - 12.9 FL    NRBC 0.0 0  WBC    ABSOLUTE NRBC 0.00 0.00 - 0.01 K/uL    NEUTROPHILS 71 32 - 75 %    LYMPHOCYTES 17 12 - 49 %    MONOCYTES 10 5 - 13 %    EOSINOPHILS 2 0 - 7 %    BASOPHILS 1 0 - 1 %    IMMATURE GRANULOCYTES 0 0.0 - 0.5 %    ABS. NEUTROPHILS 6.2 1.8 - 8.0 K/UL    ABS. LYMPHOCYTES 1.5 0.8 - 3.5 K/UL    ABS. MONOCYTES 0.8 0.0 - 1.0 K/UL    ABS. EOSINOPHILS 0.2 0.0 - 0.4 K/UL    ABS. BASOPHILS 0.1 0.0 - 0.1 K/UL    ABS. IMM.  GRANS. 0.0 0.00 - 0.04 K/UL    DF AUTOMATED          Assessment:     Assessment:   CP; consistent with [place] with abnormal ekg and enzymes  CAD; suspected to be severe  PVD; s/p renal stent and Lt iliac stent  HTN  PAF; no recurrence  H/o recurrent ischemic colitis        Plan:   Tele  Serial enzymes  She needs cath ASAP; I informed benefits and risks to pt and her granddaughter who works in cath lab    Alejandra Brooks MD

## 2018-08-22 NOTE — PROGRESS NOTES
0730:  Bedside shift change report given to Williams Norton RN (oncoming nurse) by Alexa Rizo RN (offgoing nurse). Report included the following information SBAR, Kardex, Procedure Summary, Intake/Output, MAR and Recent Results. 0800:  Dr. Tony Powell and [address] [place] [address] RNs at bedside to obtain consent and transport to [address] [place] [address]. Patient ambulated to restroom am PO pills administered. Granddaughter, Khan Custer, with patient. 1200:  Patient arrived to unit from CCL, placed on tele and confirmed with monitor, vitals and assessment complete, L groin site cdi with no bleeding or hematoma. 1930:  Bedside shift change report given to Leatha Henry RN (oncoming nurse) by Williams Norton RN (offgoing nurse). Report included the following information SBAR, Kardex, Procedure Summary, Intake/Output, MAR and Recent Results.

## 2018-08-22 NOTE — PROGRESS NOTES
NUTRITION COMPLETE ASSESSMENT    RECOMMENDATIONS:   1. Diet advancement per MD to: low fiber, bland diet  2. Add daily MVI  3. Daily weights on standing scale     Interventions/Plan:   Food/Nutrient Delivery:   (preferences/tolerance noted) Commercial supplement (Ensure Compact TID (chocolate))          Assessment:   Reason for Assessment: [x]At Nutrition Risk    Diet: Full liquids  Supplements: none  Nutritionally Significant Medications: [x] Reviewed & Includes: cardura, mag sulfate, flagyl, protonix, NS @ 100ml/hr: PRN: Zofran  Meal Intake:   Patient Vitals for the past 100 hrs:   % Diet Eaten   08/22/18 1600 100 %   08/22/18 1200 0 %     Pre-Hospitalization:  Usual Appetite: Fair  Diet at Home: low fiber, bland  Vitamins/Supplements:  (ensure occassionally)    Subjective:  \"I follow a low fiber diet at home, a lot of things bother my stomach. \"    Objective:  Pt admitted for abd pain. PMHx: ischemic bowel, diverticulosis, HTN, CAD. Abd pain PTA with nausea over past 5 days with hx of ischemic colitis (but not surgical candidate d/t age). MD noting abd pain could be related to cardiac. Elevated troponin on admit. Cardiac cath completed today - ER 45=50%. Flagyl started based on GI hx. Unsure if GI consult planned this admit. Diet advanced to full liquids today. Pt and family visited with decreased intake since Thursday with some diarrhea reported with wt loss. Follows a low fiber, bland diet at home:   B- waffle/toast strudel + rice krispies/milk + coffee   L - PB sandwich or grilled cheese   D - meat, mashed potatoes, green beans  Likes: chocolate ice cream, pudding and Ensure; smooth yogurt. Will add Ensure Compact TID (660kcal, 27g protein) with snacks between meals. Severe wt loss of 12% x <3 months noted. 89% IBW. Muscle wasting observed. -124#.    Wt Readings:   08/22/18 50.5 kg (111 lb 5.3 oz)   06/06/18 56.7 kg (125 lb)   03/19/18 55.3 kg (122 lb)   01/23/18 55.3 kg (122 lb)     Patient meets criteria for Severe Acute Protein Calorie Malnutrition as evidenced by:   ASPEN Malnutrition Criteria  Acute Illness, Chronic Illness, or Social/Enviornmental: Acute illness  Energy Intake: <75% est energy req for > 7 days  Weight Loss: Greater than 7.5% x 3 mos  Muscle Mass: Moderate  ASPEN Malnutrition Score - Acute Illness: 13  Acute Illness - Malnutrition Diagnosis: Severe malnutrition. Will continue to follow for PO intake/diet tolerance, wt trends. Estimated Nutrition Needs:   Kcals/day: 1428 Kcals/day (1428-1530kcal)  Protein: 56 g (56-66g (1.1-1.3g/kg))  Fluid: 1500 ml (1ml/kcal)  Based On: Kcal/kg - specify (Comment) (28-30kcal/kg)  Weight Used: Actual wt (51kg)    Pt expected to meet estimated nutrient needs:  []   Yes     []  No [x] Unable to predict at this time  Nutrition Diagnosis:   1.  Unintended weight loss related to inadequate oral intake 2/2 altered GI fx as evidenced by severe wt loss of 12% x 3 months; muscle wasting; abd pain with poor PO     Goals:     Consumption of at least 50% of meals and 2 supplements/day; wt maintenance     Monitoring & Evaluation:    - Total energy intake, Liquid meal replacement, Protein intake   - Weight/weight change, GI    Previous Nutrition Goals Met:   N/A  Previous Recommendations:    N/A    Education & Discharge Needs:   [x] None Identified   [] Identified and addressed    [] Participated in care plan, discharge planning, and/or interdisciplinary rounds        Cultural, Sikh and ethnic food preferences identified: None    Skin Integrity: [x]Intact  []Other  Edema: [x]None []Other  Last BM: 8/22  Food Allergies: [x]None []Other  Diet Restrictions: Cultural/Spiritism Preference(s): None     Anthropometrics:    Weight Loss Metrics 8/22/2018 8/21/2018 8/20/2018 6/6/2018 3/19/2018 1/23/2018 1/15/2018   Today's Wt 111 lb 5.3 oz - - 125 lb 122 lb 122 lb 133 lb 12.8 oz   BMI - 18.53 kg/m2 - 20.18 kg/m2 19.69 kg/m2 19.69 kg/m2 21.6 kg/m2      Weight Source: Bed  Height: 5' 5\" (165.1 cm),    Body mass index is 18.53 kg/(m^2).   IBW : 56.7 kg (125 lb), % IBW (Calculated): 89.07 %  Usual Body Weight: 55.3 kg (122 lb),      Labs:    Lab Results   Component Value Date/Time    Sodium 139 08/22/2018 02:13 AM    Potassium 3.2 (L) 08/22/2018 02:13 AM    Chloride 106 08/22/2018 02:13 AM    CO2 25 08/22/2018 02:13 AM    Glucose 78 08/22/2018 02:13 AM    BUN 6 08/22/2018 02:13 AM    Creatinine 0.54 (L) 08/22/2018 02:13 AM    Calcium 8.0 (L) 08/22/2018 02:13 AM    Magnesium 1.5 (L) 08/21/2018 01:24 PM    Albumin 3.6 08/21/2018 01:24 PM     Leonel Shaffer RD Pager #[phone] or [phone]

## 2018-08-22 NOTE — PROGRESS NOTES
Clinical Pharmacy Note: Metronidazole Dosing    Please note that the metronidazole dose for Janeann Fee has been changed to 500 mg IV q12h per [facility]-approved protocol. Please contact the pharmacy with any questions.     [address], LARRYD

## 2018-08-22 NOTE — PROGRESS NOTES
Problem: Falls - Risk of  Goal: *Absence of Falls  Document Thania Fall Risk and appropriate interventions in the flowsheet. Outcome: Progressing Towards Goal  Fall Risk Interventions:  Mobility Interventions: Patient to call before getting OOB         Medication Interventions: Teach patient to arise slowly, Patient to call before getting OOB    Elimination Interventions: Patient to call for help with toileting needs    History of Falls Interventions: Door open when patient unattended, Room close to nurse's station        Problem: Pressure Injury - Risk of  Goal: *Prevention of pressure injury  Document Rogerio Scale and appropriate interventions in the flowsheet. Outcome: Progressing Towards Goal  Pressure Injury Interventions:       Moisture Interventions: Absorbent underpads, Check for incontinence Q2 hours and as needed    Activity Interventions: Increase time out of bed, PT/OT evaluation, Assess need for specialty bed    Mobility Interventions: HOB 30 degrees or less, Float heels    Nutrition Interventions: Document food/fluid/supplement intake                    Problem: Cath Lab Procedures: Post-Cath Day of Procedure (Initiate SCIP Measures for Post-Op Care)  Goal: *Procedure site is without bleeding and signs of infection six hours post sheath removal  Outcome: Progressing Towards Goal  L groin site cdi with no bleeding or hematoma.

## 2018-08-22 NOTE — PROCEDURES
Cardiac Catheterization Procedure Note   Patient: Etta Otto  MRN: [id number]  SSN: [id number]   YOB: 1926 Age: 80 y.o.   Sex: female    Date of Procedure: 8/22/2018   Pre-procedure Diagnosis: NSTEMI  Post-procedure Diagnosis: Coronary Artery Disease/probable Takotsubo cardiomyopathy  Procedure: Left Heart Cath/bilateral renal angiogram and Peripheral Angiogram  :  Dr. Jessa Mondragon MD    Assistant(s):  None  Anesthesia: Moderate Sedation   Estimated Blood Loss: Less than 10 mL   Specimens Removed: None  Findings: LVG; apical akinesis; EF 45 to 50%; EDP 12 mmHg; LM is large and normal; LCX is large and dominant and normal; LAD with minimal lesion; RCA is small non-dominant and normal  Complications: None   Implants:  None  Signed by:  Jessa Mondragon MD  8/22/2018  9:44 AM

## 2018-08-23 PROBLEM — I51.81 TAKOTSUBO CARDIOMYOPATHY: Status: ACTIVE | Noted: 2018-08-23

## 2018-08-23 LAB
ANION GAP SERPL CALC-SCNC: 10 MMOL/L (ref 5–15)
BUN SERPL-MCNC: 10 MG/DL (ref 6–20)
BUN/CREAT SERPL: 16 (ref 12–20)
CALCIUM SERPL-MCNC: 8.2 MG/DL (ref 8.5–10.1)
CHLORIDE SERPL-SCNC: 107 MMOL/L (ref 97–108)
CO2 SERPL-SCNC: 23 MMOL/L (ref 21–32)
CREAT SERPL-MCNC: 0.61 MG/DL (ref 0.55–1.02)
GLUCOSE SERPL-MCNC: 84 MG/DL (ref 65–100)
LACTATE SERPL-SCNC: 0.8 MMOL/L (ref 0.4–2)
MAGNESIUM SERPL-MCNC: 1.7 MG/DL (ref 1.6–2.4)
POTASSIUM SERPL-SCNC: 3.6 MMOL/L (ref 3.5–5.1)
SODIUM SERPL-SCNC: 140 MMOL/L (ref 136–145)
TROPONIN I SERPL-MCNC: 0.5 NG/ML

## 2018-08-23 PROCEDURE — 84484 ASSAY OF TROPONIN QUANT: CPT | Performed by: INTERNAL MEDICINE

## 2018-08-23 PROCEDURE — 80048 BASIC METABOLIC PNL TOTAL CA: CPT | Performed by: INTERNAL MEDICINE

## 2018-08-23 PROCEDURE — 74011250636 HC RX REV CODE- 250/636: Performed by: FAMILY MEDICINE

## 2018-08-23 PROCEDURE — 83605 ASSAY OF LACTIC ACID: CPT | Performed by: INTERNAL MEDICINE

## 2018-08-23 PROCEDURE — 65660000000 HC RM CCU STEPDOWN

## 2018-08-23 PROCEDURE — 74011250637 HC RX REV CODE- 250/637: Performed by: INTERNAL MEDICINE

## 2018-08-23 PROCEDURE — 83735 ASSAY OF MAGNESIUM: CPT | Performed by: INTERNAL MEDICINE

## 2018-08-23 PROCEDURE — 74011250637 HC RX REV CODE- 250/637: Performed by: FAMILY MEDICINE

## 2018-08-23 PROCEDURE — 36415 COLL VENOUS BLD VENIPUNCTURE: CPT | Performed by: INTERNAL MEDICINE

## 2018-08-23 RX ORDER — POTASSIUM CHLORIDE 750 MG/1
20 TABLET, FILM COATED, EXTENDED RELEASE ORAL
Status: COMPLETED | OUTPATIENT
Start: 2018-08-23 | End: 2018-08-23

## 2018-08-23 RX ADMIN — ASPIRIN 81 MG: 81 TABLET, COATED ORAL at 09:04

## 2018-08-23 RX ADMIN — FLECAINIDE ACETATE 100 MG: 100 TABLET ORAL at 17:45

## 2018-08-23 RX ADMIN — POTASSIUM CHLORIDE 20 MEQ: 750 TABLET, EXTENDED RELEASE ORAL at 09:04

## 2018-08-23 RX ADMIN — Medication 10 ML: at 22:37

## 2018-08-23 RX ADMIN — Medication 10 ML: at 13:19

## 2018-08-23 RX ADMIN — LORAZEPAM 0.5 MG: 0.5 TABLET ORAL at 22:37

## 2018-08-23 RX ADMIN — DOXAZOSIN 1 MG: 1 TABLET ORAL at 09:05

## 2018-08-23 RX ADMIN — Medication 10 ML: at 07:05

## 2018-08-23 RX ADMIN — Medication 10 ML: at 07:10

## 2018-08-23 RX ADMIN — METRONIDAZOLE 500 MG: 500 INJECTION, SOLUTION INTRAVENOUS at 04:30

## 2018-08-23 RX ADMIN — LOVASTATIN 20 MG: 20 TABLET ORAL at 22:37

## 2018-08-23 RX ADMIN — METRONIDAZOLE 500 MG: 500 INJECTION, SOLUTION INTRAVENOUS at 15:33

## 2018-08-23 RX ADMIN — FLECAINIDE ACETATE 100 MG: 100 TABLET ORAL at 09:05

## 2018-08-23 RX ADMIN — PANTOPRAZOLE SODIUM 40 MG: 40 TABLET, DELAYED RELEASE ORAL at 07:10

## 2018-08-23 RX ADMIN — LISINOPRIL 20 MG: 20 TABLET ORAL at 09:04

## 2018-08-23 RX ADMIN — AMLODIPINE BESYLATE 5 MG: 5 TABLET ORAL at 09:05

## 2018-08-23 NOTE — PROGRESS NOTES
Medical Progress Note      NAME: Kuldip Ochoa   :  8/10/1926  MRM:  [id number]    Date/Time: 2018           Problem List:     Active Problems:    HTN (hypertension) (2011)      Abdominal pain (12/10/2011)      Overview: LLQ , , thought due to intestinal spasm      Elevated troponin (2018)      Takotsubo cardiomyopathy (2018)             Subjective:     PCP. Suspected Takotsubo Cardiomyopathy based on cath findings and clinical picture. She denies cp,sob, dizziness, palpitations. Trop I up to 0.5  Today     Passing flatus; feels gas sensation in her abdomen. Denies nausea, indigestion . Flagyl IV continues. Past Medical History:   Diagnosis Date    Acute ischemic colitis ([address]     Arrhythmia     Paroxysmal  Afib,  SSS    Carotid stenosis, bilateral     moderate , right >left    GERD (gastroesophageal reflux disease) 12/10/2011    HTN (hypertension) 2011    HX OTHER MEDICAL     cardiac cath ;non obstructive CAD. Renal artery stents; open    HX OTHER MEDICAL     LLQ abd. pain thought due to spasm.  Hypercholesterolemia     Hypertension     Osteoporosis     Osteoporosis 2011    PAF (paroxysmal atrial fibrillation) ([address] 2011    PVD [facility]peripheral [facility] disease) [facility]     Rhinitis 2011    SSS (sick sinus syndrome) ([facility] 2011    Takotsubo cardiomyopathy 2018            Objective:         Vitals:      Last 24hrs VS reviewed since prior progress note.  Most recent are:    Visit Vitals    /64 (BP 1 Location: Left arm, BP Patient Position: At rest)    Pulse 72    Temp 98 °F (36.7 °C)    Resp 20    Ht 5' 5\" (1.651 m)    Wt 112 lb 14 oz (51.2 kg)    SpO2 96%    BMI 18.78 kg/m2     SpO2 Readings from Last 6 Encounters:   18 96%   18 94%   18 98%   01/15/18 98%   17 98%   17 99%    O2 Flow Rate (L/min): 3 l/min     Intake/Output Summary (Last 24 hours) at 18 [id number]  Last data filed at 08/22/18 1600   Gross per 24 hour   Intake           413.33 ml   Output              200 ml   Net           213.33 ml                  Exam:      General:  Alert, cooperative, no distress, appears stated age. Lungs:   Clear to auscultation bilaterally. Heart:  Regular rate and rhythm, S1, S2 normal,old  2/6 systolic  murmur, no click, rub or gallop. Abdomen:   Soft, non-tender. Bowel sounds normal. No masses,  No organomegaly. Left groin cath site dressing in place. Extremities: Feet 2+ rt PT and 2+ left DP pulses ; no pedal  edema.      Lab Data Reviewed: (see below)  Recent Results (from the past 24 hour(s))   MAGNESIUM    Collection Time: 08/23/18 12:31 AM   Result Value Ref Range    Magnesium 1.7 1.6 - 2.4 mg/dL   METABOLIC PANEL, BASIC    Collection Time: 08/23/18 12:31 AM   Result Value Ref Range    Sodium 140 136 - 145 mmol/L    Potassium 3.6 3.5 - 5.1 mmol/L    Chloride 107 97 - 108 mmol/L    CO2 23 21 - 32 mmol/L    Anion gap 10 5 - 15 mmol/L    Glucose 84 65 - 100 mg/dL    BUN 10 6 - 20 MG/DL    Creatinine 0.61 0.55 - 1.02 MG/DL    BUN/Creatinine ratio 16 12 - 20      GFR est AA >60 >60 ml/min/1.73m2    GFR est non-AA >60 >60 ml/min/1.73m2    Calcium 8.2 (L) 8.5 - 10.1 MG/DL   TROPONIN I    Collection Time: 08/23/18 12:31 AM   Result Value Ref Range    Troponin-I, Qt. 0.50 (H) <0.05 ng/mL       Medications Reviewed: (see below)    ______________________________________________________________________    Medications:     Current Facility-Administered Medications   Medication Dose Route Frequency    potassium chloride SR (KLOR-CON 10) tablet 20 mEq  20 mEq Oral NOW    sodium chloride (NS) flush 5-10 mL  5-10 mL IntraVENous Q8H    sodium chloride (NS) flush 5-10 mL  5-10 mL IntraVENous PRN    0.9% sodium chloride infusion  50 mL/hr IntraVENous CONTINUOUS    amLODIPine (NORVASC) tablet 2.5 mg  2.5 mg Oral PRN    amLODIPine (NORVASC) tablet 5 mg  5 mg Oral DAILY    aspirin delayed-release tablet 81 mg  81 mg Oral DAILY    doxazosin (CARDURA) tablet 1 mg  1 mg Oral DAILY    flecainide (TAMBOCOR) tablet 100 mg  100 mg Oral BID    lisinopril (PRINIVIL, ZESTRIL) tablet 20 mg  20 mg Oral DAILY    LORazepam (ATIVAN) tablet 0.5 mg  0.5 mg Oral BID PRN    lovastatin (MEVACOR) tablet 20 mg  20 mg Oral QHS    ondansetron (ZOFRAN ODT) tablet 4 mg  4 mg Oral Q6H PRN    pantoprazole (PROTONIX) tablet 40 mg  40 mg Oral ACB    sodium chloride (NS) flush 5-10 mL  5-10 mL IntraVENous Q8H    sodium chloride (NS) flush 5-10 mL  5-10 mL IntraVENous PRN    metroNIDAZOLE (FLAGYL) IVPB premix 500 mg  500 mg IntraVENous Q12H    morphine injection 1 mg  1 mg IntraVENous Q4H PRN                   Assessment:   Suspected Takotsubo Cardiomyopathy. Troponin I higher today than yesterday . Cardiology to see in f/u     Abdominal pain on admission. Abdomen feels better to her . Flagyl continues . P: advance to Cardiac diet. GI to see     HTN . Chronically labile. Follow     Hypomag. Resolved     Hypok+ better.  Will give additional KCl   Patient Active Problem List   Diagnosis Code    HTN (hypertension) I10    SSS (sick [facility] syndrome) ([facility]) I49.5    PAF (paroxysmal atrial fibrillation) ([facility]) I48.0    Hypercholesterolemia E78.00    Osteoporosis M81.0    Rhinitis J31.0    Carotid stenosis, bilateral I65.23    GERD (gastroesophageal reflux disease) K21.9    Abdominal pain R10.9    Peripheral vascular disease ([facility]) I73.9    Ischemic colitis ([facility]) K55.9    Gastritis K29.70    Diverticulitis of colon (without mention of hemorrhage)(562.11) K57.32    Diarrhea R19.7    Colitis K52.9    Volume depletion, gastrointestinal loss E86.9    Abdominal pain, acute, generalized R10.84    Left sided colitis with rectal bleeding ([facility]) K51.511    Thrush, oral B37.0    Fever and chills R50.9    Acute respiratory failure with hypoxemia ([facility]) J96.01    Vasovagal near syncope R55    Non-sustained ventricular tachycardia ([address] I47.2    UTI (lower urinary tract infection) N39.0    Unsteady gait R26.81    VBI (vertebrobasilar insufficiency) G45.0    Labile essential hypertension I10    Functional gait abnormality R26.89    PVCs (premature ventricular contractions) I49.3    Syncope R55    Acute colitis K52.9    Elevated troponin R74.8    Takotsubo cardiomyopathy I51.81          Plan:     I spoke on phone w/grand dtr Negrito Potter                                                        ___________________________________________________      Attending Physician: Edgar Vizcaino MD

## 2018-08-23 NOTE — PROGRESS NOTES
Bedside and Verbal shift change report given to [facility] (oncoming nurse) by La Nena Gant (offgoing nurse). Report included the following information SBAR, Kardex, Intake/Output, MAR, Recent Results and Cardiac Rhythm NSR.

## 2018-08-23 NOTE — PROGRESS NOTES
Bedside shift change report given to Teresa Wolf RN (oncoming nurse) by Apolinar Purcell RN (offgoing nurse). Report included the following information SBAR, Kardex, ED Summary, Procedure Summary, Intake/Output, MAR, Accordion, Recent Results and Cardiac Rhythm NSR.

## 2018-08-23 NOTE — PROGRESS NOTES
To the : the patient is being managed for Severe Protein Calorie Malnutrition. Nutrionist consult is appreciated.

## 2018-08-23 NOTE — CONSULTS
[address] [place] [address] [phone]        GASTROENTEROLOGY CONSULTATION NOTE  Will Lauren Fang  [phone] office  [phone] NP/PA in-hospital cell phone M-F until 4:30PM  After 5PM or on weekends, please call  for physician on call        NAME:  Doe Salazar   :   8/10/1926   MRN:   [id number]       Referring Physician: Dr. Ace Xavier Date: 2018 10:56 AM    Chief Complaint: abdominal pain     History of Present Illness:  Patient is a 80 y.o. who is seen in consultation at the request of Dr. Nova Aguilar for abdominal pain. Patient has a past medical history significant for hypertension, hypercholesterolemia, coronary artery disease, paroxysmal atrial fibrillation, carotid stenosis, peripheral vascular disease, acute ischemic colitis, diverticulosis, and GERD. Patient was seen by us during admission in 2018 for ischemic colitis. She presented to the ED with complains of abdominal pain. Patient was admitted to the hospital on 18. Patient complains of LLQ abdominal pain for the last approximately 5-7 days with a history of similar episodic pain in the past.  She was initially seen by her primary care physician, Dr. Nova Aguilar. Patient had been started on Cipro and Flagyl. CT abdomen/pelvis without contrast (18) showed no acute process. Pain seems to be have been concentrated to the LLQ. No clear triggers, however patient's daughter notes that her last 2 episodes of this type of pain were after eating at [facility]. No clear radiation of the pain. No nausea, reflux, or vomiting. Patient reports a history of alternating diarrhea and constipation. She takes MiraLax 17 g most days with improvement in constipation. No melena or hematochezia. Overall, patient notes nearly 100% improvement in her symptoms since admission. Patient's daughter is at the bedside. No NSAIDs. No anticoagulation. No alcohol or tobacco use.   History of cholecystectomy, appendectomy, and hysterectomy. She has seen Dr. Janine Arvizu as an outpatient in the past and reports that her last colonoscopy was in her [de-identified]. Per the patient's report, she was told not to have another colonoscopy because her colon was too thin and redundant. I have reviewed the emergency room note, hospital admission note, notes by all other clinicians who have seen the patient during this hospitalization to date. I have reviewed the problem list and the reason for this hospitalization. I have reviewed the allergies and the medications the patient was taking at home prior to this hospitalization. PMH:  Past Medical History:   Diagnosis Date    Acute ischemic colitis ([address]     Arrhythmia     Paroxysmal  Afib,  SSS    Carotid stenosis, bilateral     moderate , right >left    GERD (gastroesophageal reflux disease) 12/10/2011    HTN (hypertension) 12/8/2011    HX OTHER MEDICAL 2011    cardiac cath ;non obstructive CAD. Renal artery stents; open    HX OTHER MEDICAL 2011    LLQ abd. pain thought due to spasm.  Hypercholesterolemia     Hypertension     Osteoporosis     Osteoporosis 12/8/2011    PAF (paroxysmal atrial fibrillation) ([address] 12/8/2011    PVD (peripheral vascular disease) ([address]     Rhinitis 12/8/2011    SSS (sick sinus syndrome) ([address] 12/8/2011    Takotsubo cardiomyopathy 8/23/2018       PSH:  Past Surgical History:   Procedure Laterality Date    ENDOSCOPY, COLON, DIAGNOSTIC  3/2/11    diverticulosis,hemorrhoids    HX CHOLECYSTECTOMY      HX HYSTERECTOMY      1960    HX OTHER SURGICAL      stent left leg    HX TONSILLECTOMY         Allergies:   Allergies   Allergen Reactions    Beta Blocker [Beta-Blockers (Beta-Adrenergic Blocking Agts)] Other (comments)     Sick sinus syndrome      Amoxicillin Nausea and Vomiting    Codeine Nausea Only    Donnatal [Phenobarb-Hyoscy-Atropine-Scop] Unknown (comments)    Exelon [Rivastigmine] Other (comments)     Nausea     Other Medication Other (comments)     Pneumovax 23 . Arm swelling     Plavix [Clopidogrel] Rash    Sular [Nisoldipine] Vertigo       Home Medications:  Prior to Admission Medications   Prescriptions Last Dose Informant Patient Reported? Taking? HYDROcodone-acetaminophen (NORCO) 5-325 mg per tablet 2018 at Unknown time  No Yes   Sig: Take 1 Tab by mouth two (2) times daily as needed for Pain. Max Daily Amount: 2 Tabs. Indications: Pain   LORazepam (ATIVAN) 0.5 mg tablet   No Yes   Sig: Take 1 Tab by mouth two (2) times daily as needed for Anxiety. Max Daily Amount: 1 mg. amLODIPine (NORVASC) 2.5 mg tablet   No Yes   Sig: Take 1 Tab by mouth as needed (SBP > 160). amLODIPine (NORVASC) 5 mg tablet 2018 at Unknown time  No Yes   Sig: TAKE 1 TABLET DAILY   aspirin delayed-release 81 mg tablet 2018 at Unknown time  Yes Yes   Sig: Take 81 mg by mouth daily. doxazosin (CARDURA) 1 mg tablet 2018 at Unknown time  No Yes   Sig: TAKE ONE-HALF (1/2) TABLET AT BEDTIME   flecainide (TAMBOCOR) 100 mg tablet 2018 at Unknown time  Yes Yes   Sig: Take 100 mg by mouth two (2) times a day. fluticasone (FLONASE) 50 mcg/actuation nasal spray   Yes Yes   Si Sprays by Both Nostrils route as needed for Rhinitis. lisinopril (PRINIVIL, ZESTRIL) 20 mg tablet 2018 at Unknown time  No Yes   Sig: TAKE 1 TABLET DAILY   loratadine (CLARITIN) 10 mg tablet   Yes Yes   Sig: Take 10 mg by mouth daily as needed. lovastatin (MEVACOR) 20 mg tablet 2018 at Unknown time  No Yes   Sig: TAKE 1 TABLET DAILY   metroNIDAZOLE (FLAGYL) 500 mg tablet 2018 at Unknown time  Yes Yes   Sig: Take 500 mg by mouth three (3) times daily. Indications: Infectious Disease of Abdomen   naloxone (NARCAN) 2 mg/actuation spry   No No   Si spray  into 1 nostril. Use a new one for next spray. put  into alternating nostrils. May repeat every 2 to 3 minutes as needed.    ondansetron (ZOFRAN ODT) 4 mg disintegrating tablet No No   Sig: Take 1 Tab by mouth every six (6) hours as needed for Nausea. pantoprazole (PROTONIX) 40 mg tablet   No No   Sig: TAKE 1 TABLET DAILY   polyethylene glycol (MIRALAX) 17 gram/dose powder   Yes No   Sig: Take 17 g by mouth daily. As needed   sodium chloride (OCEAN) 0.65 % nasal spray   No No   Si Sprays by Both Nostrils route every two (2) hours as needed for Congestion.       Facility-Administered Medications: None       Hospital Medications:  Current Facility-Administered Medications   Medication Dose Route Frequency    sodium chloride (NS) flush 5-10 mL  5-10 mL IntraVENous Q8H    sodium chloride (NS) flush 5-10 mL  5-10 mL IntraVENous PRN    0.9% sodium chloride infusion  50 mL/hr IntraVENous CONTINUOUS    amLODIPine (NORVASC) tablet 2.5 mg  2.5 mg Oral PRN    amLODIPine (NORVASC) tablet 5 mg  5 mg Oral DAILY    aspirin delayed-release tablet 81 mg  81 mg Oral DAILY    doxazosin (CARDURA) tablet 1 mg  1 mg Oral DAILY    flecainide (TAMBOCOR) tablet 100 mg  100 mg Oral BID    lisinopril (PRINIVIL, ZESTRIL) tablet 20 mg  20 mg Oral DAILY    LORazepam (ATIVAN) tablet 0.5 mg  0.5 mg Oral BID PRN    lovastatin (MEVACOR) tablet 20 mg  20 mg Oral QHS    ondansetron (ZOFRAN ODT) tablet 4 mg  4 mg Oral Q6H PRN    pantoprazole (PROTONIX) tablet 40 mg  40 mg Oral ACB    sodium chloride (NS) flush 5-10 mL  5-10 mL IntraVENous Q8H    sodium chloride (NS) flush 5-10 mL  5-10 mL IntraVENous PRN    metroNIDAZOLE (FLAGYL) IVPB premix 500 mg  500 mg IntraVENous Q12H    morphine injection 1 mg  1 mg IntraVENous Q4H PRN       Social History:  Social History   Substance Use Topics    Smoking status: Never Smoker    Smokeless tobacco: Never Used    Alcohol use No       Family History:  Family History   Problem Relation Age of Onset    Hypertension Mother        Review of Systems:  Constitutional: negative fever, negative chills, negative weight loss  Eyes:   negative visual changes  ENT: negative sore throat, tongue or lip swelling  Respiratory:  negative cough, negative dyspnea  Cards:  negative for chest pain, palpitations, lower extremity edema  GI:   See HPI  :  negative for frequency, dysuria  Integument:  negative for rash and pruritus  Heme:  negative for easy bruising and gum/nose bleeding  Musculoskeletal:negative for myalgias, back pain and muscle weakness  Neuro:    negative for headaches, dizziness, vertigo  Psych: negative for feelings of anxiety, depression     Objective:   Patient Vitals for the past 8 hrs:   BP Temp Pulse Resp SpO2 Weight   08/23/18 0904 136/50 - 66 - - -   08/23/18 0700 163/58 98.4 °F (36.9 °C) 70 16 94 % -   08/23/18 0300 128/64 98 °F (36.7 °C) 72 20 96 % 51.2 kg (112 lb 14 oz)        08/21 1901 - 08/23 0700  In: 413.3 [P.O.:240; I.V.:173.3]  Out: 200 [Urine:200]    EXAM:     CONST:  Pleasant female lying in bed, no acute distress, daughter is at the bedside   NEURO:  Alert and oriented x 3   HEENT: EOMI, no scleral icterus   LUNGS: CTA bilaterally anteriorly   CARD:  S1 S2   ABD:  Soft, non distended, mild periumbilical tenderness, no guarding, no rebound. + Bowel sounds. EXT:  Warm   PSYCH: Full, not anxious     Data Review     Recent Labs      08/22/18   0213  08/21/18   1324   WBC  8.8  7.7   HGB  12.8  13.0   HCT  38.2  38.8   PLT  264  247     Recent Labs      08/23/18   0031  08/22/18   0213   NA  140  139   K  3.6  3.2*   CL  107  106   CO2  23  25   BUN  10  6   CREA  0.61  0.54*   GLU  84  78   CA  8.2*  8.0*     Recent Labs      08/21/18   1324  08/20/18   1217   SGOT  58*  53*   AP  74  71   TP  7.1  6.8   ALB  3.6  4.2   GLOB  3.5   --    LPSE   --   29     No results for input(s): INR, PTP, APTT in the last 72 hours. No lab exists for component: INREXT    8/20/18  EXAM: CT ABDOMEN AND PELVIS WITHOUT CONTRAST  INDICATION: Chest pain, unspecified. Lower abdominal pain. COMPARISON: 1/13/2018.   CONTRAST: None.     TECHNIQUE:   Unenhanced multislice helical CT was performed from the diaphragm to the  symphysis pubis without intravenous contrast administration. Oral contrast was  not administered. Contiguous 5 mm axial images were reconstructed and lung and  soft tissue windows were generated. Coronal and sagittal reformations were  generated. CT dose reduction was achieved through use of a standardized protocol  tailored for this examination and automatic exposure control for dose  modulation. Adaptive statistical iterative reconstruction (ASIR) was utilized  for this examination.     FINDINGS:  LOWER CHEST: The visualized portions of the lung bases are clear. The absence of intravenous contrast material reduces the sensitivity for  evaluation of the solid parenchymal organs of the abdomen. ABDOMEN:  Liver: The liver is normal in size and contour with no focal abnormality. Gallbladder and bile ducts: There are clips in the gallbladder fossa and the  gallbladder is absent. Spleen: No abnormality. Pancreas: No abnormality. Adrenal glands: No abnormality. Kidneys: No focal parenchymal abnormality. There is no renal or ureteral  calculus or obstruction. PELVIS:  Reproductive organs: The uterus is absent. Bladder: No abnormality. RETROPERITONEUM: The aorta is atherosclerotic and tapers without aneurysm. There  are bilateral renal artery stents. . There is no retroperitoneal adenopathy or  mass. There is no pelvic mass or adenopathy. BOWEL AND MESENTERY: The small bowel is normal. The appendix is absent. There  are diverticula    PERITONEUM: There is no ascites or free intraperitoneal air. BONES AND SOFT TISSUES: There are degenerative disc changes at L5-S1.     IMPRESSION  IMPRESSION:   1. No acute abdominal or pelvic abnormality. 2. Atherosclerotic abdominal aorta without aneurysm. 3. Bilateral renal artery stents. 4. Status post cholecystectomy. 5. Status post hysterectomy. 6. Status post appendectomy. 7. Diverticulosis. 8.  L5-S1 degenerative disc disease. Assessment:   · Abdominal pain, improved: CT abdomen/pelvis without contrast (8/20/18): no acute abdominal or pelvic abnormality; diverticula; findings above. History of cholecystectomy, appendectomy, and hysterectomy. WBC 8.8, Hgb 12.8, AST: 58, ALT: 37, alkaline phosphatase: 74, total bilirubin: 0.4. Lipase normal.   · Elevated troponin, NSTEMI: cardiology following.    · Coronary artery disease  · Paroxysmal atrial fibrillation     Patient Active Problem List   Diagnosis Code    HTN (hypertension) I10    [facility]SS [facility]) ([facility] I49.5    PAF (paroxysmal atrial fibrillation) ([facility]) I48.0    Hypercholesterolemia E78.00    Osteoporosis M81.0    Rhinitis J31.0    Carotid stenosis, bilateral I65.23    GERD (gastroesophageal reflux disease) K21.9    Abdominal pain R10.9    Peripheral vascular disease ([facility]) I73.9    Ischemic colitis ([facility] K55.9    Gastritis K29.70    Diverticulitis of colon (without mention of hemorrhage)(562.11) K57.32    Diarrhea R19.7    Colitis K52.9    Volume depletion, gastrointestinal loss E86.9    Abdominal pain, acute, generalized R10.84    Left sided colitis with rectal bleeding ([facility]) K51.511    Thrush, oral B37.0    Fever and chills R50.9    Acute respiratory failure with hypoxemia ([facility]) J96.01    Vasovagal near syncope R55    Non-sustained ventricular tachycardia ([facility]) I47.2    UTI (lower urinary tract infection) N39.0    Unsteady gait R26.81    VBI (vertebrobasilar insufficiency) G45.0    Labile essential hypertension I10    Functional gait abnormality R26.89    PVCs (premature ventricular contractions) I49.3    Syncope R55    Acute colitis K52.9    Elevated troponin R74.8    Takotsubo cardiomyopathy I51.81     Plan:     · Diet as tolerated  · Continue antibiotics  · Consider trial of dicyclomine if return of pain  · Thank you for allowing me to participate in care of Nneka Peck     Signed By: ALTAGRACIA Britt 8/23/2018  10:56 AM       GI Attending: Patient seen and examined. History of ischemic colitis in the past. Lactic acid initially elevated in setting of NSTEMI and is now normal. She reports resolution of her diarrhea and abdominal pain. If her symptoms recur, we can request contrasted CT and stool studies. Will follow along with you. Nikos Rosenberg MD

## 2018-08-23 NOTE — PROGRESS NOTES
Reason for Admission:  The patient presented with acute abdominal pain                    RRAT Score:   18               Do you (patient/family) have any concerns for transition/discharge? None identified at this time                  Plan for utilizing home health:  Anticipate none- PT/OT not consulted currently        Likelihood of readmission? Low            Transition of Care Plan:  Anticipate home     The CM met with the patient at bedside in order to introduce the role of CM and assess for patient needs. The patient lives alone in own home, and verified PCP as Dr. Marely Casper. The patient denied difficulty affording or accessing medications prior to hospitalization. The patient also denied use of any DME in the home- does not utilize O2 at baseline. The patient endorsed that family will provide transportation home upon discharge- has two children that transport her to and from physician appointments. Patient endorses being independent with ADLs and mobility. CM will continue to follow as discharge needs arise. JAIME Cheney    Care Management Interventions  PCP Verified by CM: Yes (Patient is followed by Dr. Marely Casper )  Mode of Transport at Discharge:  Other (see comment) (Family will provide transportation home upon discharge)  Transition of Care Consult (CM Consult): Discharge Planning  MyChart Signup: No  Discharge Durable Medical Equipment: No  Health Maintenance Reviewed: Yes  Physical Therapy Consult: No  Occupational Therapy Consult: No  Speech Therapy Consult: No  Current Support Network: Own Home, Lives Alone  Confirm Follow Up Transport: Family  Plan discussed with Pt/Family/Caregiver: Yes   Resource Information Provided?: No  Discharge Location  Discharge Placement: Home

## 2018-08-23 NOTE — PROGRESS NOTES
Cardiology Progress Note                                        Admit Date: 8/21/2018    Assessment/Plan:     NSTEMI; her troponin this am suggestive of this condition, no complication; she is intolerant to Plavix and BB  Takotsubo cardiomyopathy; very mild with EF 45 to 50% but closer to 50%; baby ASA a day for now; already on ACEI but no other regimen as she can not take them;   Intolerance to BB; due to sick sinus syndrome; her HR drops quite low with BB  CAD; minimal LAD  PAF; no recurrence on Flecainide, which will be continued    Kuldip Ochoa is a 80 y.o. female with     PROBLEM LIST:  Patient Active Problem List    Diagnosis Date Noted    Takotsubo cardiomyopathy 08/23/2018    Elevated troponin 08/21/2018    Acute colitis 01/13/2018    Syncope 04/25/2017    PVCs (premature ventricular contractions) 04/14/2017    Vasovagal near syncope 10/14/2015    Non-sustained ventricular tachycardia ([address] 10/14/2015    UTI (lower urinary tract infection) 10/14/2015    Unsteady gait 10/14/2015    VBI (vertebrobasilar insufficiency) 10/14/2015    Labile essential hypertension 10/14/2015    Functional gait abnormality 10/14/2015    Fever and chills 04/28/2015    Acute respiratory failure with hypoxemia ([address] 04/28/2015    Thrush, oral 03/07/2015    Left sided colitis with rectal bleeding ([address] 03/05/2015    Diarrhea 03/04/2015    Colitis 03/04/2015    Volume depletion, gastrointestinal loss 03/04/2015    Abdominal pain, acute, generalized 03/04/2015    Diverticulitis of colon (without mention of hemorrhage)(562.11) 10/22/2013    Ischemic colitis ([address] 10/21/2013    Gastritis 10/21/2013    Peripheral vascular disease ([address] 06/06/2012    Carotid stenosis, bilateral 12/10/2011    GERD (gastroesophageal reflux disease) 12/10/2011    Abdominal pain 12/10/2011    HTN (hypertension) 12/08/2011    SSS (sick sinus syndrome) ([address] 12/08/2011    PAF (paroxysmal atrial fibrillation) ([address] 12/08/2011    Hypercholesterolemia 12/08/2011    Osteoporosis 12/08/2011    Rhinitis 12/08/2011         Subjective: Kev Peck reports none. Visit Vitals    /50    Pulse 66    Temp 98.4 °F (36.9 °C)    Resp 16    Ht 5' 5\" (1.651 m)    Wt 51.2 kg (112 lb 14 oz)    SpO2 94%    BMI 18.78 kg/m2       Intake/Output Summary (Last 24 hours) at 08/23/18 0919  Last data filed at 08/22/18 1600   Gross per 24 hour   Intake           413.33 ml   Output              200 ml   Net           213.33 ml       Objective:      Physical Exam:  HEENT: Perrla, EOMI  Neck: No JVD,  No thyroidmegaly  Resp: CTA bilaterally;  No wheezes or rales  CV: RRR s1s2 No murmur no s3  Abd:Soft, Nontender  Ext: No edema  Neuro: Alert and oriented; Nonfocal  Skin: Warm, Dry, Intact  Pulses: 2+ DP/PT/Rad      Telemetry: normal sinus rhythm    Current Facility-Administered Medications   Medication Dose Route Frequency    sodium chloride (NS) flush 5-10 mL  5-10 mL IntraVENous Q8H    sodium chloride (NS) flush 5-10 mL  5-10 mL IntraVENous PRN    0.9% sodium chloride infusion  50 mL/hr IntraVENous CONTINUOUS    amLODIPine (NORVASC) tablet 2.5 mg  2.5 mg Oral PRN    amLODIPine (NORVASC) tablet 5 mg  5 mg Oral DAILY    aspirin delayed-release tablet 81 mg  81 mg Oral DAILY    doxazosin (CARDURA) tablet 1 mg  1 mg Oral DAILY    flecainide (TAMBOCOR) tablet 100 mg  100 mg Oral BID    lisinopril (PRINIVIL, ZESTRIL) tablet 20 mg  20 mg Oral DAILY    LORazepam (ATIVAN) tablet 0.5 mg  0.5 mg Oral BID PRN    lovastatin (MEVACOR) tablet 20 mg  20 mg Oral QHS    ondansetron (ZOFRAN ODT) tablet 4 mg  4 mg Oral Q6H PRN    pantoprazole (PROTONIX) tablet 40 mg  40 mg Oral ACB    sodium chloride (NS) flush 5-10 mL  5-10 mL IntraVENous Q8H    sodium chloride (NS) flush 5-10 mL  5-10 mL IntraVENous PRN    metroNIDAZOLE (FLAGYL) IVPB premix 500 mg  500 mg IntraVENous Q12H    morphine injection 1 mg  1 mg IntraVENous Q4H PRN         Data Review: Labs:    Recent Results (from the past 24 hour(s))   MAGNESIUM    Collection Time: 08/23/18 12:31 AM   Result Value Ref Range    Magnesium 1.7 1.6 - 2.4 mg/dL   METABOLIC PANEL, BASIC    Collection Time: 08/23/18 12:31 AM   Result Value Ref Range    Sodium 140 136 - 145 mmol/L    Potassium 3.6 3.5 - 5.1 mmol/L    Chloride 107 97 - 108 mmol/L    CO2 23 21 - 32 mmol/L    Anion gap 10 5 - 15 mmol/L    Glucose 84 65 - 100 mg/dL    BUN 10 6 - 20 MG/DL    Creatinine 0.61 0.55 - 1.02 MG/DL    BUN/Creatinine ratio 16 12 - 20      GFR est AA >60 >60 ml/min/1.73m2    GFR est non-AA >60 >60 ml/min/1.73m2    Calcium 8.2 (L) 8.5 - 10.1 MG/DL   TROPONIN I    Collection Time: 08/23/18 12:31 AM   Result Value Ref Range    Troponin-I, Qt. 0.50 (H) <0.05 ng/mL

## 2018-08-23 NOTE — CDMP QUERY
Please clarify if this patient is (was) being treated/managed for: Severe protein-calorie malnutrition in the setting of Severe unintended weight loss of 12% of total body weight X 3 months, muscle wasting, abdominal pain with poor PO intake requiring Nutrition consult, Nutritional supplements, daily weight monitoring.    => Other explanation of clinical findings  => Clinically Undetermined (no explanation for clinical findings)    The medical record reflects the following clinical findings, treatment, and risk factors. Risk Factors:    Clinical Indicators:    8/22 Nutrition  Patient meets criteria for Severe Acute Protein Calorie Malnutrition as evidenced by:   ASPEN Malnutrition Criteria  Acute Illness, Chronic Illness, or Social/Enviornmental: Acute illness  Energy Intake: <75% est energy req for > 7 days  Weight Loss: Greater than 7.5% x 3 mos  Muscle Mass: Moderate  ASPEN Malnutrition Score - Acute Illness: 13  Acute Illness - Malnutrition Diagnosis: Severe malnutrition. Treatment: Nutrition consult, Nutritional supplements; Commercial supplement (Ensure Compact TID (chocolate), daily weight monitoring. Please clarify and document your clinical opinion in the progress notes and discharge summary including the definitive and/or presumptive diagnosis, (suspected or probable), related to the above clinical findings. Please include clinical findings supporting your diagnosis.     Thank you  Yessy Arroyo  [facility]  [phone]

## 2018-08-23 NOTE — PROGRESS NOTES
Problem: Falls - Risk of  Goal: *Absence of Falls  Document Thania Fall Risk and appropriate interventions in the flowsheet. Outcome: Progressing Towards Goal  Fall Risk Interventions:  Mobility Interventions: Assess mobility with egress test, Communicate number of staff needed for ambulation/transfer, Mechanical lift, OT consult for ADLs, Patient to call before getting OOB, PT Consult for mobility concerns, PT Consult for assist device competence, Strengthening exercises (ROM-active/passive), Utilize walker, cane, or other assistive device         Medication Interventions: Assess postural VS orthostatic hypotension, Evaluate medications/consider consulting pharmacy, Patient to call before getting OOB, Teach patient to arise slowly    Elimination Interventions: Call light in reach, Patient to call for help with toileting needs, Toilet paper/wipes in reach, Toileting schedule/hourly rounds    History of Falls Interventions: Consult care management for discharge planning, Door open when patient unattended, Evaluate medications/consider consulting pharmacy, Investigate reason for fall, Room close to nurse's station        Problem: Pressure Injury - Risk of  Goal: *Prevention of pressure injury  Document Rogerio Scale and appropriate interventions in the flowsheet. Outcome: Progressing Towards Goal  Pressure Injury Interventions:       Moisture Interventions: Absorbent underpads, Assess need for specialty bed, Limit adult briefs, Maintain skin hydration (lotion/cream), Minimize layers, Moisture barrier    Activity Interventions: Assess need for specialty bed, Increase time out of bed, Pressure redistribution bed/mattress(bed type), PT/OT evaluation    Mobility Interventions: Assess need for specialty bed, HOB 30 degrees or less, Pressure redistribution bed/mattress (bed type), PT/OT evaluation, Turn and reposition approx.  every two hours(pillow and wedges)    Nutrition Interventions: Document food/fluid/supplement intake, Offer support with meals,snacks and hydration

## 2018-08-23 NOTE — PROGRESS NOTES
Spiritual Care Partner Volunteer visited patient in [phone] on 8/23/18. Documented by:   Chaplain Griffin MDiv, MACE  [phone] ([phone])

## 2018-08-24 VITALS
TEMPERATURE: 97.4 F | OXYGEN SATURATION: 96 % | DIASTOLIC BLOOD PRESSURE: 43 MMHG | WEIGHT: 124.56 LBS | RESPIRATION RATE: 22 BRPM | HEIGHT: 65 IN | SYSTOLIC BLOOD PRESSURE: 125 MMHG | BODY MASS INDEX: 20.75 KG/M2 | HEART RATE: 63 BPM

## 2018-08-24 PROBLEM — I21.4 ACUTE NON-ST ELEVATION MYOCARDIAL INFARCTION (NSTEMI) (HCC): Status: ACTIVE | Noted: 2018-08-24

## 2018-08-24 PROBLEM — R77.8 ELEVATED TROPONIN: Status: RESOLVED | Noted: 2018-08-21 | Resolved: 2018-08-24

## 2018-08-24 LAB
ANION GAP SERPL CALC-SCNC: 7 MMOL/L (ref 5–15)
BASOPHILS # BLD: 0.1 K/UL (ref 0–0.1)
BASOPHILS NFR BLD: 1 % (ref 0–1)
BUN SERPL-MCNC: 13 MG/DL (ref 6–20)
BUN/CREAT SERPL: 24 (ref 12–20)
CALCIUM SERPL-MCNC: 7.8 MG/DL (ref 8.5–10.1)
CHLORIDE SERPL-SCNC: 110 MMOL/L (ref 97–108)
CO2 SERPL-SCNC: 24 MMOL/L (ref 21–32)
COMMENT, HOLDF: NORMAL
CREAT SERPL-MCNC: 0.55 MG/DL (ref 0.55–1.02)
DIFFERENTIAL METHOD BLD: ABNORMAL
EOSINOPHIL # BLD: 0.5 K/UL (ref 0–0.4)
EOSINOPHIL NFR BLD: 6 % (ref 0–7)
ERYTHROCYTE [DISTWIDTH] IN BLOOD BY AUTOMATED COUNT: 12.8 % (ref 11.5–14.5)
GLUCOSE SERPL-MCNC: 84 MG/DL (ref 65–100)
HCT VFR BLD AUTO: 32.8 % (ref 35–47)
HGB BLD-MCNC: 10.7 G/DL (ref 11.5–16)
IMM GRANULOCYTES # BLD: 0 K/UL (ref 0–0.04)
IMM GRANULOCYTES NFR BLD AUTO: 0 % (ref 0–0.5)
LACTATE SERPL-SCNC: 0.5 MMOL/L (ref 0.4–2)
LYMPHOCYTES # BLD: 1.9 K/UL (ref 0.8–3.5)
LYMPHOCYTES NFR BLD: 24 % (ref 12–49)
MCH RBC QN AUTO: 30.2 PG (ref 26–34)
MCHC RBC AUTO-ENTMCNC: 32.6 G/DL (ref 30–36.5)
MCV RBC AUTO: 92.7 FL (ref 80–99)
MONOCYTES # BLD: 0.8 K/UL (ref 0–1)
MONOCYTES NFR BLD: 10 % (ref 5–13)
NEUTS SEG # BLD: 4.5 K/UL (ref 1.8–8)
NEUTS SEG NFR BLD: 59 % (ref 32–75)
NRBC # BLD: 0 K/UL (ref 0–0.01)
NRBC BLD-RTO: 0 PER 100 WBC
PLATELET # BLD AUTO: 229 K/UL (ref 150–400)
PMV BLD AUTO: 10.8 FL (ref 8.9–12.9)
POTASSIUM SERPL-SCNC: 3.8 MMOL/L (ref 3.5–5.1)
RBC # BLD AUTO: 3.54 M/UL (ref 3.8–5.2)
SAMPLES BEING HELD,HOLD: NORMAL
SODIUM SERPL-SCNC: 141 MMOL/L (ref 136–145)
TROPONIN I SERPL-MCNC: 0.26 NG/ML
WBC # BLD AUTO: 7.6 K/UL (ref 3.6–11)

## 2018-08-24 PROCEDURE — 85025 COMPLETE CBC W/AUTO DIFF WBC: CPT | Performed by: INTERNAL MEDICINE

## 2018-08-24 PROCEDURE — 74011250637 HC RX REV CODE- 250/637: Performed by: INTERNAL MEDICINE

## 2018-08-24 PROCEDURE — 83605 ASSAY OF LACTIC ACID: CPT | Performed by: INTERNAL MEDICINE

## 2018-08-24 PROCEDURE — 94760 N-INVAS EAR/PLS OXIMETRY 1: CPT

## 2018-08-24 PROCEDURE — 77010033678 HC OXYGEN DAILY

## 2018-08-24 PROCEDURE — 36415 COLL VENOUS BLD VENIPUNCTURE: CPT | Performed by: INTERNAL MEDICINE

## 2018-08-24 PROCEDURE — 74011250637 HC RX REV CODE- 250/637: Performed by: FAMILY MEDICINE

## 2018-08-24 PROCEDURE — 84484 ASSAY OF TROPONIN QUANT: CPT | Performed by: INTERNAL MEDICINE

## 2018-08-24 PROCEDURE — 74011250636 HC RX REV CODE- 250/636: Performed by: FAMILY MEDICINE

## 2018-08-24 PROCEDURE — 80048 BASIC METABOLIC PNL TOTAL CA: CPT | Performed by: INTERNAL MEDICINE

## 2018-08-24 RX ORDER — METRONIDAZOLE 250 MG/1
500 TABLET ORAL EVERY 12 HOURS
Status: DISCONTINUED | OUTPATIENT
Start: 2018-08-24 | End: 2018-08-24 | Stop reason: HOSPADM

## 2018-08-24 RX ORDER — METRONIDAZOLE 500 MG/1
500 TABLET ORAL 2 TIMES DAILY
Qty: 10 TAB | Refills: 0 | Status: SHIPPED
Start: 2018-08-24 | End: 2018-08-30

## 2018-08-24 RX ADMIN — METRONIDAZOLE 500 MG: 500 INJECTION, SOLUTION INTRAVENOUS at 04:17

## 2018-08-24 RX ADMIN — Medication 10 ML: at 07:09

## 2018-08-24 RX ADMIN — METRONIDAZOLE 500 MG: 250 TABLET ORAL at 10:19

## 2018-08-24 RX ADMIN — FLECAINIDE ACETATE 100 MG: 100 TABLET ORAL at 10:48

## 2018-08-24 RX ADMIN — AMLODIPINE BESYLATE 5 MG: 5 TABLET ORAL at 10:47

## 2018-08-24 RX ADMIN — PANTOPRAZOLE SODIUM 40 MG: 40 TABLET, DELAYED RELEASE ORAL at 07:10

## 2018-08-24 RX ADMIN — DOXAZOSIN 1 MG: 1 TABLET ORAL at 10:47

## 2018-08-24 RX ADMIN — LISINOPRIL 20 MG: 20 TABLET ORAL at 10:47

## 2018-08-24 RX ADMIN — AMLODIPINE BESYLATE 2.5 MG: 5 TABLET ORAL at 07:09

## 2018-08-24 RX ADMIN — Medication 10 ML: at 07:10

## 2018-08-24 RX ADMIN — ASPIRIN 81 MG: 81 TABLET, COATED ORAL at 10:19

## 2018-08-24 NOTE — CARDIO/PULMONARY
Cardiac Rehab: Clay Bianchi is sleeping therefore met with Granddaughter, All Henry. The patient no longer drives and Salma stays with her \"2 nights a week\". Cesilia Moss confirmed patient has \"Takotsubo's and that \"Dr. Janeth Gonzalez said she had a heart attack but I am not sure about that. \". HF education and MI education given. Discussed daily weights, signs and symptoms of fluid retention, and when to call the physician for Takotsubo's NOT for HF. Discussed elevated cardiac enzymes and possible causes. Discussed the cardiac rehab program, benefits, and encouraged enrollment. Declined making an appointment st the time but \"will see how she does and discuss it once we get home\". Will follow, by phone, for enrollment once eligibility is confirmed. Cesilia Moss verbalized understanding with questions answered.

## 2018-08-24 NOTE — PROGRESS NOTES
I have reviewed discharge instructions with the patient and granddaughter. The patient and granddaughter verbalized understanding. Bedside RN performed patient education and medication education. Discharge concerns initiated and discussed with patient, including clarification on \"who\" assists the patient at their home and instructions for when the home going patient should call their provider after discharge. Opportunity for questions and clarification was provided. Patient receptive to education: YES  Patient stated: Saintclair Simmering Understanding  Barriers to Education: None  Diagnosis Education given:  YES    Length of stay: 4  Expected Day of Discharge: 8/24/2018  Ask if they have \"Help at Home\" & add to white board?   YES    Education Day #: 4    Medication Education Given:  YES  M in the box Medication name: Flagyl    Pt aware of [facility] survey: YES

## 2018-08-24 NOTE — PROGRESS NOTES
Patient is medically stable for discharge- RN reports patient has been [facility] at [facility] without concerns, patient will discharge home with family providing transport.  JAIME Aguayo

## 2018-08-24 NOTE — PROGRESS NOTES
Appears stable for d/c . I spoke with grd dtr Delfina Mathur about d/c instructions . D/c summary dictated.

## 2018-08-24 NOTE — PROGRESS NOTES
[facility]  [facility], [address]       GI PROGRESS NOTE  Will Blaze Tirado  [phone] office  [phone] NP/PA in-hospital cell phone M-F until 4:30PM  After 5PM or on weekends, please call  for physician on call      NAME: Johanna Babb   :  8/10/1926   MRN:  [id number]       Subjective:   Patient appears comfortable. No nausea, vomiting, abdominal pain, or diarrhea. She had a normal bowel movement yesterday. No melena or hematochezia. Family is at the bedside. Objective:     VITALS:   Last 24hrs VS reviewed since prior progress note. Most recent are:  Visit Vitals    /43    Pulse 63    Temp 97.4 °F (36.3 °C)    Resp 22    Ht 5' 5\" (1.651 m)    Wt 56.5 kg (124 lb 9 oz)    SpO2 96%    BMI 20.73 kg/m2       PHYSICAL EXAM:  General: Cooperative, no acute distress    Neurologic:  Alert and oriented  HEENT: EOMI, no scleral icterus   Lungs:  CTA bilaterally anteriorly  Heart:  S1 S2  Abdomen: Soft, non-distended, no tenderness, no guarding, no rebound. +Bowel sounds. Extremities: Warm  Psych:   Good insight. Not anxious or agitated.     Lab Data Reviewed:     Recent Results (from the past 24 hour(s))   LACTIC ACID    Collection Time: 18  4:17 AM   Result Value Ref Range    Lactic acid 0.5 0.4 - 2.0 MMOL/L   METABOLIC PANEL, BASIC    Collection Time: 18  4:17 AM   Result Value Ref Range    Sodium 141 136 - 145 mmol/L    Potassium 3.8 3.5 - 5.1 mmol/L    Chloride 110 (H) 97 - 108 mmol/L    CO2 24 21 - 32 mmol/L    Anion gap 7 5 - 15 mmol/L    Glucose 84 65 - 100 mg/dL    BUN 13 6 - 20 MG/DL    Creatinine 0.55 0.55 - 1.02 MG/DL    BUN/Creatinine ratio 24 (H) 12 - 20      GFR est AA >60 >60 ml/min/1.73m2    GFR est non-AA >60 >60 ml/min/1.73m2    Calcium 7.8 (L) 8.5 - 10.1 MG/DL   CBC WITH AUTOMATED DIFF    Collection Time: 18  4:17 AM   Result Value Ref Range    WBC 7.6 3.6 - 11.0 K/uL    RBC 3.54 (L) 3.80 - 5.20 M/uL    HGB 10.7 (L) 11.5 - 16.0 g/dL    HCT 32.8 (L) 35.0 - 47.0 %    MCV 92.7 80.0 - 99.0 FL    MCH 30.2 26.0 - 34.0 PG    MCHC 32.6 30.0 - 36.5 g/dL    RDW 12.8 11.5 - 14.5 %    PLATELET 852 514 - 919 K/uL    MPV 10.8 8.9 - 12.9 FL    NRBC 0.0 0  WBC    ABSOLUTE NRBC 0.00 0.00 - 0.01 K/uL    NEUTROPHILS 59 32 - 75 %    LYMPHOCYTES 24 12 - 49 %    MONOCYTES 10 5 - 13 %    EOSINOPHILS 6 0 - 7 %    BASOPHILS 1 0 - 1 %    IMMATURE GRANULOCYTES 0 0.0 - 0.5 %    ABS. NEUTROPHILS 4.5 1.8 - 8.0 K/UL    ABS. LYMPHOCYTES 1.9 0.8 - 3.5 K/UL    ABS. MONOCYTES 0.8 0.0 - 1.0 K/UL    ABS. EOSINOPHILS 0.5 (H) 0.0 - 0.4 K/UL    ABS. BASOPHILS 0.1 0.0 - 0.1 K/UL    ABS. IMM. GRANS. 0.0 0.00 - 0.04 K/UL    DF AUTOMATED     TROPONIN I    Collection Time: 08/24/18  4:17 AM   Result Value Ref Range    Troponin-I, Qt. 0.26 (H) <0.05 ng/mL   SAMPLES BEING HELD    Collection Time: 08/24/18  7:05 AM   Result Value Ref Range    SAMPLES BEING HELD 1PST     COMMENT        Add-on orders for these samples will be processed based on acceptable specimen integrity and analyte stability, which may vary by analyte. Assessment:   · Abdominal pain, improved: History of ischemic colitis in the past. CT abdomen/pelvis without contrast (8/20/18): no acute abdominal or pelvic abnormality; diverticula; findings above. History of cholecystectomy, appendectomy, and hysterectomy. WBC 7.6, Hgb 10.7, (AST: 58, ALT: 37, alkaline phosphatase: 74, total bilirubin: 0.4, lipase: normal). Lactic acid normal.   · Elevated troponin, NSTEMI: cardiology following.    · Coronary artery disease  · Paroxysmal atrial fibrillation     Patient Active Problem List   Diagnosis Code    HTN (hypertension) I10    SSS (sick sinus syndrome) ([facility]) I49.5    PAF (paroxysmal atrial fibrillation) ([facility]) I48.0    Hypercholesterolemia E78.00    Osteoporosis M81.0    Rhinitis J31.0    Carotid stenosis, bilateral I65.23    GERD (gastroesophageal reflux disease) K21.9    Abdominal pain R10.9    Peripheral vascular disease ([facility]) I73.9    Ischemic colitis ([facility]) K55.9    Gastritis K29.70    Diverticulitis of colon (without mention of hemorrhage)(562.11) K57.32    Diarrhea R19.7    Colitis K52.9    Volume depletion, gastrointestinal loss E86.9    Abdominal pain, acute, generalized R10.84    Left sided colitis with rectal bleeding ([facility]) K51.511    Thrush, oral B37.0    Fever and chills R50.9    Acute respiratory failure with hypoxemia ([facility]) J96.01    Vasovagal near syncope R55    Non-sustained ventricular tachycardia ([facility]) I47.2    UTI (lower urinary tract infection) N39.0    Unsteady gait R26.81    VBI (vertebrobasilar insufficiency) G45.0    Labile essential hypertension I10    Functional gait abnormality R26.89    PVCs (premature ventricular contractions) I49.3    Syncope R55    Acute colitis K52.9    Takotsubo cardiomyopathy I51.81    Acute non-ST elevation myocardial infarction (NSTEMI) ([facility]) I21.4     Plan:   · Continue supportive measures  · Continue antibiotics  · If symptoms recur, consider CT abdomen/pelvis with contrast and stool studies. · Follow up in the office with primary GI, Dr. Stephen Damian     Signed By: ALTAGRACIA Linton     8/24/2018  1:39 PM       GI Attending: Agree with above. No further GI testing needed at this time. Follow up with Dr. Stephen Damian (primary GI). Nikos Bhatia MD

## 2018-08-24 NOTE — PROGRESS NOTES
Cardiology Progress Note                                        Admit Date: 8/21/2018    Assessment/Plan:     Takotsubo cardiomyopathy; stable; she is ready to be discharged  HTN; she needs SBP around 150 to 160s for her PVD  CAD; minimal  PVD; Dr. Michaela Bishop to take care of this  H/o PAF; no recurrence    Modesta Buerger is a 80 y.o. female with     PROBLEM LIST:  Patient Active Problem List    Diagnosis Date Noted    Acute non-ST elevation myocardial infarction (NSTEMI) ([address] 08/24/2018    Takotsubo cardiomyopathy 08/23/2018    Acute colitis 01/13/2018    Syncope 04/25/2017    PVCs (premature ventricular contractions) 04/14/2017    Vasovagal near syncope 10/14/2015    Non-sustained ventricular tachycardia ([address] 10/14/2015    UTI (lower urinary tract infection) 10/14/2015    Unsteady gait 10/14/2015    VBI (vertebrobasilar insufficiency) 10/14/2015    Labile essential hypertension 10/14/2015    Functional gait abnormality 10/14/2015    Fever and chills 04/28/2015    Acute respiratory failure with hypoxemia ([address] 04/28/2015    Thrush, oral 03/07/2015    Left sided colitis with rectal bleeding ([address] 03/05/2015    Diarrhea 03/04/2015    Colitis 03/04/2015    Volume depletion, gastrointestinal loss 03/04/2015    Abdominal pain, acute, generalized 03/04/2015    Diverticulitis of colon (without mention of hemorrhage)(562.11) 10/22/2013    Ischemic colitis ([address] 10/21/2013    Gastritis 10/21/2013    Peripheral vascular disease ([address] 06/06/2012    Carotid stenosis, bilateral 12/10/2011    GERD (gastroesophageal reflux disease) 12/10/2011    Abdominal pain 12/10/2011    HTN (hypertension) 12/08/2011    SSS (sick sinus syndrome) ([address] 12/08/2011    PAF (paroxysmal atrial fibrillation) ([address] 12/08/2011    Hypercholesterolemia 12/08/2011    Osteoporosis 12/08/2011    Rhinitis 12/08/2011         Subjective: Jimmy Peck reports none.     Visit Vitals    /60    Pulse 66    Temp 97.5 °F (36.4 °C)    Resp 14    Ht 5' 5\" (1.651 m)    Wt 56.5 kg (124 lb 9 oz)    SpO2 98%    BMI 20.73 kg/m2       Intake/Output Summary (Last 24 hours) at 08/24/18 0945  Last data filed at 08/23/18 1200   Gross per 24 hour   Intake              200 ml   Output                0 ml   Net              200 ml       Objective:      Physical Exam:  HEENT: Perrla, EOMI  Neck: No JVD,  No thyroidmegaly  Resp: CTA bilaterally;  No wheezes or rales  CV: RRR s1s2 No murmur no s3  Abd:Soft, Nontender  Ext: No edema  Neuro: Alert and oriented; Nonfocal  Skin: Warm, Dry, Intact  Pulses: 2+ DP/PT/Rad      Telemetry: normal sinus rhythm    Current Facility-Administered Medications   Medication Dose Route Frequency    metroNIDAZOLE (FLAGYL) tablet 500 mg  500 mg Oral Q12H    sodium chloride (NS) flush 5-10 mL  5-10 mL IntraVENous Q8H    sodium chloride (NS) flush 5-10 mL  5-10 mL IntraVENous PRN    amLODIPine (NORVASC) tablet 2.5 mg  2.5 mg Oral PRN    amLODIPine (NORVASC) tablet 5 mg  5 mg Oral DAILY    aspirin delayed-release tablet 81 mg  81 mg Oral DAILY    doxazosin (CARDURA) tablet 1 mg  1 mg Oral DAILY    flecainide (TAMBOCOR) tablet 100 mg  100 mg Oral BID    lisinopril (PRINIVIL, ZESTRIL) tablet 20 mg  20 mg Oral DAILY    LORazepam (ATIVAN) tablet 0.5 mg  0.5 mg Oral BID PRN    lovastatin (MEVACOR) tablet 20 mg  20 mg Oral QHS    ondansetron (ZOFRAN ODT) tablet 4 mg  4 mg Oral Q6H PRN    pantoprazole (PROTONIX) tablet 40 mg  40 mg Oral ACB    sodium chloride (NS) flush 5-10 mL  5-10 mL IntraVENous Q8H    sodium chloride (NS) flush 5-10 mL  5-10 mL IntraVENous PRN    morphine injection 1 mg  1 mg IntraVENous Q4H PRN         Data Review:   Labs:    Recent Results (from the past 24 hour(s))   LACTIC ACID    Collection Time: 08/23/18 10:58 AM   Result Value Ref Range    Lactic acid 0.8 0.4 - 2.0 MMOL/L   LACTIC ACID    Collection Time: 08/24/18  4:17 AM   Result Value Ref Range    Lactic acid 0.5 0.4 - 2.0 MMOL/L METABOLIC PANEL, BASIC    Collection Time: 08/24/18  4:17 AM   Result Value Ref Range    Sodium 141 136 - 145 mmol/L    Potassium 3.8 3.5 - 5.1 mmol/L    Chloride 110 (H) 97 - 108 mmol/L    CO2 24 21 - 32 mmol/L    Anion gap 7 5 - 15 mmol/L    Glucose 84 65 - 100 mg/dL    BUN 13 6 - 20 MG/DL    Creatinine 0.55 0.55 - 1.02 MG/DL    BUN/Creatinine ratio 24 (H) 12 - 20      GFR est AA >60 >60 ml/min/1.73m2    GFR est non-AA >60 >60 ml/min/1.73m2    Calcium 7.8 (L) 8.5 - 10.1 MG/DL   CBC WITH AUTOMATED DIFF    Collection Time: 08/24/18  4:17 AM   Result Value Ref Range    WBC 7.6 3.6 - 11.0 K/uL    RBC 3.54 (L) 3.80 - 5.20 M/uL    HGB 10.7 (L) 11.5 - 16.0 g/dL    HCT 32.8 (L) 35.0 - 47.0 %    MCV 92.7 80.0 - 99.0 FL    MCH 30.2 26.0 - 34.0 PG    MCHC 32.6 30.0 - 36.5 g/dL    RDW 12.8 11.5 - 14.5 %    PLATELET 536 539 - 224 K/uL    MPV 10.8 8.9 - 12.9 FL    NRBC 0.0 0  WBC    ABSOLUTE NRBC 0.00 0.00 - 0.01 K/uL    NEUTROPHILS 59 32 - 75 %    LYMPHOCYTES 24 12 - 49 %    MONOCYTES 10 5 - 13 %    EOSINOPHILS 6 0 - 7 %    BASOPHILS 1 0 - 1 %    IMMATURE GRANULOCYTES 0 0.0 - 0.5 %    ABS. NEUTROPHILS 4.5 1.8 - 8.0 K/UL    ABS. LYMPHOCYTES 1.9 0.8 - 3.5 K/UL    ABS. MONOCYTES 0.8 0.0 - 1.0 K/UL    ABS. EOSINOPHILS 0.5 (H) 0.0 - 0.4 K/UL    ABS. BASOPHILS 0.1 0.0 - 0.1 K/UL    ABS. IMM. GRANS. 0.0 0.00 - 0.04 K/UL    DF AUTOMATED     TROPONIN I    Collection Time: 08/24/18  4:17 AM   Result Value Ref Range    Troponin-I, Qt. 0.26 (H) <0.05 ng/mL   SAMPLES BEING HELD    Collection Time: 08/24/18  7:05 AM   Result Value Ref Range    SAMPLES BEING HELD 1PST     COMMENT        Add-on orders for these samples will be processed based on acceptable specimen integrity and analyte stability, which may vary by analyte.

## 2018-08-24 NOTE — PROGRESS NOTES
Medical Progress Note      NAME: Antony Ortega   :  8/10/1926  MRM:  [id number]    Date/Time: 2018           Problem List:     Active Problems:    HTN (hypertension) (2011)      Abdominal pain (12/10/2011)      Overview: LLQ , , thought due to intestinal spasm      Takotsubo cardiomyopathy (2018)      Acute non-ST elevation myocardial infarction (NSTEMI) ([address] (2018)      Overview: 18              Subjective:     PCP    Denies cp,sob,dizziness,palpitations. She walked with grand daughter in the [place] last night. Cardiology consult appreciated     Abdomen feels ok. Passed smear of bm . GI c/s appreciated     Past Medical History:   Diagnosis Date    [facility]     Acute non-ST elevation myocardial infarction (NSTEMI) ([facility] 2018     Arrhythmia     Paroxysmal  Afib,  SSS    Carotid stenosis, bilateral     moderate , right >left    GERD (gastroesophageal reflux disease) 12/10/2011    HTN (hypertension) 2011    HX OTHER MEDICAL     cardiac cath ;non obstructive CAD. Renal artery stents; open    HX OTHER MEDICAL     LLQ abd. pain thought due to spasm.  Hypercholesterolemia     Hypertension     Osteoporosis     Osteoporosis 2011    PAF (paroxysmal atrial fibrillation) ([facility] 2011    PVD (peripheral vascular disease) ([facility]     Rhinitis 2011    SSS (sick sinus syndrome) ([facility] 2011    Takotsubo cardiomyopathy 2018            Objective:         Vitals:      Last 24hrs VS reviewed since prior progress note.  Most recent are:    Visit Vitals    /65 (BP 1 Location: Left arm, BP Patient Position: At rest)    Pulse 62    Temp 97.5 °F (36.4 °C)    Resp 16    Ht 5' 5\" (1.651 m)    Wt 124 lb 9 oz (56.5 kg)    SpO2 96%    BMI 20.73 kg/m2     SpO2 Readings from Last 6 Encounters:   18 96%   18 94%   18 98%   01/15/18 98%   17 98%   17 99%    O2 Flow Rate (L/min): 3 l/min Intake/Output Summary (Last 24 hours) at 08/24/18 0643  Last data filed at 08/23/18 1200   Gross per 24 hour   Intake              440 ml   Output                0 ml   Net              440 ml                  Exam:      General:  Alert, cooperative, no distress, appears stated age. Lungs:   Clear to auscultation bilaterally. Heart:  Regular rate and rhythm, S1, S2 normal, no murmur, click, rub or gallop. Abdomen:   Soft, non-tender. Bowel sounds normal. No masses,  No organomegaly. Extremities:  2+ rt DP pulse and left 2+ PT pulses . No pedal edema.      Lab Data Reviewed: (see below)  Recent Results (from the past 24 hour(s))   LACTIC ACID    Collection Time: 08/23/18 10:58 AM   Result Value Ref Range    Lactic acid 0.8 0.4 - 2.0 MMOL/L   LACTIC ACID    Collection Time: 08/24/18  4:17 AM   Result Value Ref Range    Lactic acid 0.5 0.4 - 2.0 MMOL/L   METABOLIC PANEL, BASIC    Collection Time: 08/24/18  4:17 AM   Result Value Ref Range    Sodium 141 136 - 145 mmol/L    Potassium 3.8 3.5 - 5.1 mmol/L    Chloride 110 (H) 97 - 108 mmol/L    CO2 24 21 - 32 mmol/L    Anion gap 7 5 - 15 mmol/L    Glucose 84 65 - 100 mg/dL    BUN 13 6 - 20 MG/DL    Creatinine 0.55 0.55 - 1.02 MG/DL    BUN/Creatinine ratio 24 (H) 12 - 20      GFR est AA >60 >60 ml/min/1.73m2    GFR est non-AA >60 >60 ml/min/1.73m2    Calcium 7.8 (L) 8.5 - 10.1 MG/DL   CBC WITH AUTOMATED DIFF    Collection Time: 08/24/18  4:17 AM   Result Value Ref Range    WBC 7.6 3.6 - 11.0 K/uL    RBC 3.54 (L) 3.80 - 5.20 M/uL    HGB 10.7 (L) 11.5 - 16.0 g/dL    HCT 32.8 (L) 35.0 - 47.0 %    MCV 92.7 80.0 - 99.0 FL    MCH 30.2 26.0 - 34.0 PG    MCHC 32.6 30.0 - 36.5 g/dL    RDW 12.8 11.5 - 14.5 %    PLATELET 134 225 - 282 K/uL    MPV 10.8 8.9 - 12.9 FL    NRBC 0.0 0  WBC    ABSOLUTE NRBC 0.00 0.00 - 0.01 K/uL    NEUTROPHILS 59 32 - 75 %    LYMPHOCYTES 24 12 - 49 %    MONOCYTES 10 5 - 13 %    EOSINOPHILS 6 0 - 7 %    BASOPHILS 1 0 - 1 %    IMMATURE GRANULOCYTES 0 0.0 - 0.5 %    ABS. NEUTROPHILS 4.5 1.8 - 8.0 K/UL    ABS. LYMPHOCYTES 1.9 0.8 - 3.5 K/UL    ABS. MONOCYTES 0.8 0.0 - 1.0 K/UL    ABS. EOSINOPHILS 0.5 (H) 0.0 - 0.4 K/UL    ABS. BASOPHILS 0.1 0.0 - 0.1 K/UL    ABS. IMM. GRANS. 0.0 0.00 - 0.04 K/UL    DF AUTOMATED         Medications Reviewed: (see below)    ______________________________________________________________________    Medications:     Current Facility-Administered Medications   Medication Dose Route Frequency    sodium chloride (NS) flush 5-10 mL  5-10 mL IntraVENous Q8H    sodium chloride (NS) flush 5-10 mL  5-10 mL IntraVENous PRN    amLODIPine (NORVASC) tablet 2.5 mg  2.5 mg Oral PRN    amLODIPine (NORVASC) tablet 5 mg  5 mg Oral DAILY    aspirin delayed-release tablet 81 mg  81 mg Oral DAILY    doxazosin (CARDURA) tablet 1 mg  1 mg Oral DAILY    flecainide (TAMBOCOR) tablet 100 mg  100 mg Oral BID    lisinopril (PRINIVIL, ZESTRIL) tablet 20 mg  20 mg Oral DAILY    LORazepam (ATIVAN) tablet 0.5 mg  0.5 mg Oral BID PRN    lovastatin (MEVACOR) tablet 20 mg  20 mg Oral QHS    ondansetron (ZOFRAN ODT) tablet 4 mg  4 mg Oral Q6H PRN    pantoprazole (PROTONIX) tablet 40 mg  40 mg Oral ACB    sodium chloride (NS) flush 5-10 mL  5-10 mL IntraVENous Q8H    sodium chloride (NS) flush 5-10 mL  5-10 mL IntraVENous PRN    metroNIDAZOLE (FLAGYL) IVPB premix 500 mg  500 mg IntraVENous Q12H    morphine injection 1 mg  1 mg IntraVENous Q4H PRN                   Assessment:     Acute NSTEMI with Takotsubo Cardiomyopathy. She appears to be recovering nicely. Can HL IVF. Abdominal pain POA , responding to empiric Flagyl -can change to PO     K+ level improved. Will give KCl again today .  F/u lab in am     Lower Hgb likely dilutional . HL IVF  Patient Active Problem List   Diagnosis Code    HTN (hypertension) I10    SSS (sick sinus syndrome) ([facility] I49.5    PAF (paroxysmal atrial fibrillation) ([facility]) I48.0    Hypercholesterolemia E78.00    Osteoporosis M81.0    Rhinitis J31.0    Carotid stenosis, bilateral I65.23    GERD (gastroesophageal reflux disease) K21.9    Abdominal pain R10.9    Peripheral vascular disease (HCC) I73.9    Ischemic colitis (HCC) K55.9    Gastritis K29.70    Diverticulitis of colon (without mention of hemorrhage)(562.11) K57.32    Diarrhea R19.7    Colitis K52.9    Volume depletion, gastrointestinal loss E86.9    Abdominal pain, acute, generalized R10.84    Left sided colitis with rectal bleeding (HCC) K51.511    Thrush, oral B37.0    Fever and chills R50.9    Acute respiratory failure with hypoxemia [facility] J96.01    Vasovagal near syncope R55    Non-sustained ventricular tachycardia ([facility]) I47.2    UTI (lower urinary tract infection) N39.0    Unsteady gait R26.81    VBI (vertebrobasilar insufficiency) G45.0    Labile essential hypertension I10    Functional gait abnormality R26.89    PVCs (premature ventricular contractions) I49.3    Syncope R55    Acute colitis K52.9    Takotsubo cardiomyopathy I51.81    Acute non-ST elevation myocardial infarction (NSTEMI) ([facility] I21.4          Plan:     I spoke with grand daughter                                                        ___________________________________________________      Attending Physician: Bonifacio Blackwood MD

## 2018-08-24 NOTE — DISCHARGE INSTRUCTIONS
Patient Discharge Instructions    Regina Irizarry / [id number] : 8/10/1926    Admitted 2018 Discharged: 2018     Take Home Medications            · It is important that you take the medication exactly as they are prescribed. · Keep your medication in the bottles provided by the pharmacist and keep a list of the medication names, dosages, and times to be taken in your wallet. · Do not take other medications without consulting your doctor. What to do at Home    Recommended diet: soft, bland     Recommended activity: as tolerated . Take your Probiotic one pill daily     If you experience any of the following symptoms : abdominal pain, dizziness, chest pain, shortness of breath, please follow up with :Dr Migel Chen at [phone]     Follow-up with Dr. Leighton Pereira on 18 at 8: 30 am  and Dr. Hans Choudhury in 1 month at his office. Information obtained by :  I understand that if any problems occur once I am at home I am to contact my physician. I understand and acknowledge receipt of the instructions indicated above.                                                                                                                                            Physician's or R.N.'s Signature                                                                  Date/Time                                                                                                                                              Patient or Representative Signature                                                          Date/Time

## 2018-08-25 NOTE — DISCHARGE SUMMARY
[address]    Marylen Marseille  MR#: [id number]  : 08/10/1926  ACCOUNT #: [de-identified]   ADMIT DATE: 2018  DISCHARGE DATE: 2018    DISCHARGE DIAGNOSES:  1. Acute non-ST elevation myocardial infarction. 2.  Takotsubo cardiomyopathy secondary to #1.  3.  Minimal coronary artery disease  4. Hypertension,  5. abdominal pain,  6 peripheral vascular disease. 7  Hypokalemia. 8.  Hypomagnesemia  9. Severe protein calorie malnutrition    DISCHARGE INSTRUCTIONS:  Amlodipine 2.5 mg daily p.r.n. SVP greater than 160. Amlodipine 5 mg p.o. daily scheduled, ASA 81 mg daily, doxazosin 0.5 mg at bedtime, flecainide 100 mg p.o. b.i.d., Flonase 2 sprays each nostril daily, hydrocodone 5/325 one p.o. b.i.d. p.r.n., lisinopril 20 mg daily, Claritin 10 mg daily p.r.n., lorazepam 0.5 mg b.i.d. p.r.n. anxiety, Mevacor 20 mg at bedtime, metronidazole 500 mg p.o. b.i.d. for anticipated 4-5 day course of therapy, Protonix 40 mg p.o. daily, MiraLax 17 grams daily as needed, saline nasal spray 2 sprays both nostrils every 2 hours as needed for nasal congestion, soft diet. Gradually increase activity. Call Dr. Shelly Ann for abdominal pain, dizziness, chest pain, shortness of breath or other acute changes in health. Otherwise, follow up with him on 2018 at 10:30 a.m. and with Dr. Pavan Field in 1 months' time. CHIEF COMPLAINT:  A 55-year-old white female admitted with left lower quadrant abdominal pain. HISTORY OF PRESENT ILLNESS:  She has a history of ischemic colitis and diverticulosis and has been hospitalized for ischemic colitis in the past.  She presents with a 3-day history of left lower quadrant abdominal pain, was seen yesterday in the office. CT scan abdomen and pelvis with oral contrast only was not revealing. She had some chest pain last night, which is now resolved. In the ER troponin I was 0.13.     PAST MEDICAL HISTORY:  Acute ischemic colitis, paroxysmal AFib, sick sinus syndrome, moderate carotid stenosis right greater than left, GERD, hypertension, 2011 cardiac cath, nonobstructive CAD, renal artery stents open, hypercholesterolemia, hypertension, osteoporosis, paroxysmal atrial fibrillation, peripheral vascular disease, rhinitis. PAST SURGICAL HISTORY:  Colonoscopy 2011:  Diverticulosis, hemorrhoids. Cholecystectomy, hysterectomy, left leg stenting, tonsillectomy. MEDICATIONS:  Metronidazole 500 mg t.i.d., Norco 5/325 one b.i.d. p.r.n., doxazosin 0.5 mg at bedtime, amlodipine 2.5 mg daily p.r.n. systolic blood pressure greater than 160, lorazepam 0.5 mg b.i.d. p.r.n., amlodipine 5 mg daily, Mevacor 20 mg at bedtime, Zestril 20 mg daily, ASA 81 mg daily, flecainide 100 mg b.i.d., Flonase 2 sprays both nostrils daily as needed for rhinitis, Claritin 10 mg daily p.r.n. rhinitis,  Zofran 4 mg q. 6 h. p.r.n. nausea, Protonix 40 mg daily, Narcan nasal spray as needed, saline nasal spray as needed, MiraLax 17 grams in water daily as needed. ALLERGIES:  ADVERSE DRUG REACTIONS BETA BLOCKERS DUE TO SICK SINUS SYNDROME. AMOXICILLIN NAUSEA, VOMITING CODEINE, DONNATAL, EXELON, PNEUMOVAX 23 ASSOCIATED WITH ARM SWELLING. PLAVIX, RASH. Alba Camper. FAMILY HISTORY:  Noncontributory. SOCIAL HISTORY:  Negative smoking, negative ETOH abuse. REVIEW OF SYSTEMS:  A comprehensive review of systems was negative except for that noted in the HPI. PHYSICAL EXAMINATION:  VITAL SIGNS:  164/52, 64, 97.7, 22, 98%. HEENT:  Negative. LUNGS:  Clear. HEART:  Regular, without murmur, gallop or rub. ABDOMEN:  Mild left lower quadrant tenderness. Soft. Bowel sounds positive. EXTREMITIES:  No edema. NEUROLOGIC:  Nonfocal.    LABORATORY DATA:  EKG:  Sinus rhythm, occasional PVCs, left anterior fascicular block, anteroseptal infarct, age undetermined. When compared with EKG of 04/25/2017 QRS duration is decreased.   T-wave inversion now evident in inferior leads, T-wave inversion more evident in lateral leads. Lactic acid level 2.2. WBC 7.7, hemoglobin 13.0, platelet count 183. Differentials normal.  Potassium level 3.1, glucose 128, AST 58. Remainder of the LFTs normal.  Urinalysis negative. Stool for occult blood negative. Magnesium 1.5. . Venous CO2 28 on venous blood gas. HOSPITAL COURSE:  The patient was admitted to telemetry as a full inpatient admission . She was found to have an NSTEMI and Takotsubo cardiomyopathy by Purvi Holland MD, cardiologist.  Her troponin I lisa to a peak of 0.5 and was 0.26 on the day of discharge. Cardiac catheterization revealed an EF of 45-50% but closer to 50%. She had minimal LAD disease. She had apical hypokinesis. She was given supplemental O2. There was no beta blocker given due to her history of sick sinus syndrome. She was continued on ACE inhibition, statin and aspirin. She was mobilized. She had abdominal pain and was given IV Metronidazole. Alex Garcia MD gastroenterologist saw her in consultation and participated in her care, agreed with continuation of metronidazole. The patient made improvement. Her hypokalemia and hypomagnesemia were both supplemented. She had evidence of severe protein calorie malnutrition and Diet and Nutrition was consulted and participated in her care. Her PO intake was good during her hospitalization   She was mobilized and discharged in improved condition to have followup as above.     DISPOSITION: home with close family support and close office follow up       Balaji Dillon MD       [facility] [place]  D: 08/24/2018 14:12     T: 08/25/2018 13:15  JOB #: [id number]  CC: Philip Worrell MD  CC: Donna Mustafa MD

## 2018-08-28 ENCOUNTER — APPOINTMENT (OUTPATIENT)
Dept: GENERAL RADIOLOGY | Age: 83
DRG: 281 | End: 2018-08-28
Attending: PHYSICIAN ASSISTANT
Payer: MEDICARE

## 2018-08-28 ENCOUNTER — HOSPITAL ENCOUNTER (INPATIENT)
Age: 83
LOS: 2 days | Discharge: HOME | DRG: 281 | End: 2018-08-30
Attending: EMERGENCY MEDICINE | Admitting: INTERNAL MEDICINE
Payer: MEDICARE

## 2018-08-28 DIAGNOSIS — J90 PLEURAL EFFUSION: ICD-10-CM

## 2018-08-28 DIAGNOSIS — R07.9 ACUTE CHEST PAIN: ICD-10-CM

## 2018-08-28 DIAGNOSIS — I50.9 ACUTE CONGESTIVE HEART FAILURE, UNSPECIFIED HEART FAILURE TYPE (HCC): Primary | ICD-10-CM

## 2018-08-28 PROBLEM — I50.41 ACUTE COMBINED SYSTOLIC AND DIASTOLIC CHF, NYHA CLASS 3 (HCC): Status: ACTIVE | Noted: 2018-08-28

## 2018-08-28 PROBLEM — I50.41 CHF (CONGESTIVE HEART FAILURE), NYHA CLASS III, ACUTE, COMBINED (HCC): Status: ACTIVE | Noted: 2018-08-28

## 2018-08-28 LAB
ANION GAP SERPL CALC-SCNC: 6 MMOL/L (ref 5–15)
ATRIAL RATE: 75 BPM
BASOPHILS # BLD: 0.1 K/UL (ref 0–0.1)
BASOPHILS NFR BLD: 1 % (ref 0–1)
BUN SERPL-MCNC: 11 MG/DL (ref 6–20)
BUN/CREAT SERPL: 15 (ref 12–20)
CALCIUM SERPL-MCNC: 8.8 MG/DL (ref 8.5–10.1)
CALCULATED P AXIS, ECG09: 55 DEGREES
CALCULATED R AXIS, ECG10: -54 DEGREES
CALCULATED T AXIS, ECG11: 94 DEGREES
CHLORIDE SERPL-SCNC: 106 MMOL/L (ref 97–108)
CO2 SERPL-SCNC: 27 MMOL/L (ref 21–32)
COMMENT, HOLDF: NORMAL
CREAT SERPL-MCNC: 0.72 MG/DL (ref 0.55–1.02)
D DIMER PPP FEU-MCNC: 1.5 MG/L FEU (ref 0–0.65)
DIAGNOSIS, 93000: NORMAL
DIFFERENTIAL METHOD BLD: NORMAL
EOSINOPHIL # BLD: 0.1 K/UL (ref 0–0.4)
EOSINOPHIL NFR BLD: 1 % (ref 0–7)
ERYTHROCYTE [DISTWIDTH] IN BLOOD BY AUTOMATED COUNT: 13.2 % (ref 11.5–14.5)
GLUCOSE SERPL-MCNC: 85 MG/DL (ref 65–100)
HCT VFR BLD AUTO: 39.4 % (ref 35–47)
HGB BLD-MCNC: 12.8 G/DL (ref 11.5–16)
IMM GRANULOCYTES # BLD: 0 K/UL (ref 0–0.04)
IMM GRANULOCYTES NFR BLD AUTO: 0 % (ref 0–0.5)
LYMPHOCYTES # BLD: 1.5 K/UL (ref 0.8–3.5)
LYMPHOCYTES NFR BLD: 18 % (ref 12–49)
MCH RBC QN AUTO: 30.4 PG (ref 26–34)
MCHC RBC AUTO-ENTMCNC: 32.5 G/DL (ref 30–36.5)
MCV RBC AUTO: 93.6 FL (ref 80–99)
MONOCYTES # BLD: 0.7 K/UL (ref 0–1)
MONOCYTES NFR BLD: 9 % (ref 5–13)
NEUTS SEG # BLD: 6 K/UL (ref 1.8–8)
NEUTS SEG NFR BLD: 72 % (ref 32–75)
NRBC # BLD: 0 K/UL (ref 0–0.01)
NRBC BLD-RTO: 0 PER 100 WBC
P-R INTERVAL, ECG05: 220 MS
PLATELET # BLD AUTO: 271 K/UL (ref 150–400)
PMV BLD AUTO: 11 FL (ref 8.9–12.9)
POTASSIUM SERPL-SCNC: 3.8 MMOL/L (ref 3.5–5.1)
Q-T INTERVAL, ECG07: 444 MS
QRS DURATION, ECG06: 160 MS
QTC CALCULATION (BEZET), ECG08: 495 MS
RBC # BLD AUTO: 4.21 M/UL (ref 3.8–5.2)
SAMPLES BEING HELD,HOLD: NORMAL
SODIUM SERPL-SCNC: 139 MMOL/L (ref 136–145)
TROPONIN I SERPL-MCNC: <0.05 NG/ML
TROPONIN I SERPL-MCNC: <0.05 NG/ML
VENTRICULAR RATE, ECG03: 75 BPM
WBC # BLD AUTO: 8.4 K/UL (ref 3.6–11)

## 2018-08-28 PROCEDURE — 85025 COMPLETE CBC W/AUTO DIFF WBC: CPT | Performed by: PHYSICIAN ASSISTANT

## 2018-08-28 PROCEDURE — 74011250636 HC RX REV CODE- 250/636: Performed by: INTERNAL MEDICINE

## 2018-08-28 PROCEDURE — 74011250637 HC RX REV CODE- 250/637: Performed by: INTERNAL MEDICINE

## 2018-08-28 PROCEDURE — 85379 FIBRIN DEGRADATION QUANT: CPT | Performed by: EMERGENCY MEDICINE

## 2018-08-28 PROCEDURE — 74011000250 HC RX REV CODE- 250: Performed by: INTERNAL MEDICINE

## 2018-08-28 PROCEDURE — 80048 BASIC METABOLIC PNL TOTAL CA: CPT | Performed by: PHYSICIAN ASSISTANT

## 2018-08-28 PROCEDURE — 84484 ASSAY OF TROPONIN QUANT: CPT | Performed by: PHYSICIAN ASSISTANT

## 2018-08-28 PROCEDURE — 93005 ELECTROCARDIOGRAM TRACING: CPT

## 2018-08-28 PROCEDURE — 71046 X-RAY EXAM CHEST 2 VIEWS: CPT

## 2018-08-28 PROCEDURE — 99284 EMERGENCY DEPT VISIT MOD MDM: CPT

## 2018-08-28 PROCEDURE — 65660000000 HC RM CCU STEPDOWN

## 2018-08-28 PROCEDURE — 36415 COLL VENOUS BLD VENIPUNCTURE: CPT | Performed by: INTERNAL MEDICINE

## 2018-08-28 RX ORDER — LORAZEPAM 0.5 MG/1
0.5 TABLET ORAL
Status: DISCONTINUED | OUTPATIENT
Start: 2018-08-28 | End: 2018-08-30 | Stop reason: HOSPADM

## 2018-08-28 RX ORDER — POLYETHYLENE GLYCOL 3350 17 G/17G
17 POWDER, FOR SOLUTION ORAL
Status: DISCONTINUED | OUTPATIENT
Start: 2018-08-28 | End: 2018-08-30 | Stop reason: HOSPADM

## 2018-08-28 RX ORDER — FUROSEMIDE 10 MG/ML
20 INJECTION INTRAMUSCULAR; INTRAVENOUS EVERY 12 HOURS
Status: DISCONTINUED | OUTPATIENT
Start: 2018-08-28 | End: 2018-08-29

## 2018-08-28 RX ORDER — FLUTICASONE PROPIONATE 50 MCG
2 SPRAY, SUSPENSION (ML) NASAL DAILY
Status: DISCONTINUED | OUTPATIENT
Start: 2018-08-29 | End: 2018-08-30 | Stop reason: HOSPADM

## 2018-08-28 RX ORDER — LISINOPRIL 10 MG/1
10 TABLET ORAL DAILY
Status: DISCONTINUED | OUTPATIENT
Start: 2018-08-29 | End: 2018-08-30

## 2018-08-28 RX ORDER — SODIUM CHLORIDE 0.9 % (FLUSH) 0.9 %
5-10 SYRINGE (ML) INJECTION AS NEEDED
Status: CANCELLED | OUTPATIENT
Start: 2018-08-28

## 2018-08-28 RX ORDER — ASPIRIN 81 MG/1
81 TABLET ORAL DAILY
Status: DISCONTINUED | OUTPATIENT
Start: 2018-08-29 | End: 2018-08-30 | Stop reason: HOSPADM

## 2018-08-28 RX ORDER — FLECAINIDE ACETATE 100 MG/1
100 TABLET ORAL 2 TIMES DAILY
Status: DISCONTINUED | OUTPATIENT
Start: 2018-08-28 | End: 2018-08-30 | Stop reason: HOSPADM

## 2018-08-28 RX ORDER — CARVEDILOL 3.12 MG/1
3.12 TABLET ORAL 2 TIMES DAILY WITH MEALS
Status: DISCONTINUED | OUTPATIENT
Start: 2018-08-28 | End: 2018-08-30 | Stop reason: HOSPADM

## 2018-08-28 RX ORDER — SODIUM CHLORIDE 0.9 % (FLUSH) 0.9 %
5-10 SYRINGE (ML) INJECTION EVERY 8 HOURS
Status: CANCELLED | OUTPATIENT
Start: 2018-08-28

## 2018-08-28 RX ORDER — HYDROCODONE BITARTRATE AND ACETAMINOPHEN 5; 325 MG/1; MG/1
1 TABLET ORAL
Status: DISCONTINUED | OUTPATIENT
Start: 2018-08-28 | End: 2018-08-30 | Stop reason: HOSPADM

## 2018-08-28 RX ORDER — PANTOPRAZOLE SODIUM 40 MG/1
40 TABLET, DELAYED RELEASE ORAL
Status: DISCONTINUED | OUTPATIENT
Start: 2018-08-29 | End: 2018-08-30 | Stop reason: HOSPADM

## 2018-08-28 RX ORDER — DOXAZOSIN 1 MG/1
0.5 TABLET ORAL
Status: DISCONTINUED | OUTPATIENT
Start: 2018-08-28 | End: 2018-08-29

## 2018-08-28 RX ORDER — ACETAMINOPHEN 325 MG/1
650 TABLET ORAL
Status: CANCELLED | OUTPATIENT
Start: 2018-08-28

## 2018-08-28 RX ORDER — POTASSIUM CHLORIDE 750 MG/1
20 TABLET, FILM COATED, EXTENDED RELEASE ORAL 2 TIMES DAILY
Status: COMPLETED | OUTPATIENT
Start: 2018-08-28 | End: 2018-08-29

## 2018-08-28 RX ORDER — LOVASTATIN 20 MG/1
20 TABLET ORAL DAILY
Status: DISCONTINUED | OUTPATIENT
Start: 2018-08-29 | End: 2018-08-30 | Stop reason: HOSPADM

## 2018-08-28 RX ORDER — LORATADINE 10 MG/1
10 TABLET ORAL
Status: DISCONTINUED | OUTPATIENT
Start: 2018-08-28 | End: 2018-08-30 | Stop reason: HOSPADM

## 2018-08-28 RX ORDER — NALOXONE HYDROCHLORIDE 0.4 MG/ML
0.2 INJECTION, SOLUTION INTRAMUSCULAR; INTRAVENOUS; SUBCUTANEOUS
Status: DISCONTINUED | OUTPATIENT
Start: 2018-08-28 | End: 2018-08-30 | Stop reason: HOSPADM

## 2018-08-28 RX ADMIN — LORAZEPAM 0.5 MG: 0.5 TABLET ORAL at 23:29

## 2018-08-28 RX ADMIN — FUROSEMIDE 20 MG: 10 INJECTION, SOLUTION INTRAMUSCULAR; INTRAVENOUS at 17:00

## 2018-08-28 RX ADMIN — HYDROCODONE BITARTRATE AND ACETAMINOPHEN 1 TABLET: 5; 325 TABLET ORAL at 17:26

## 2018-08-28 RX ADMIN — CARVEDILOL 3.12 MG: 3.12 TABLET, FILM COATED ORAL at 16:59

## 2018-08-28 RX ADMIN — POTASSIUM CHLORIDE 20 MEQ: 750 TABLET, EXTENDED RELEASE ORAL at 19:31

## 2018-08-28 RX ADMIN — FLECAINIDE ACETATE 100 MG: 100 TABLET ORAL at 17:26

## 2018-08-28 RX ADMIN — DOXAZOSIN 0.5 MG: 1 TABLET ORAL at 21:45

## 2018-08-28 RX ADMIN — POLYETHYLENE GLYCOL 3350 17 G: 17 POWDER, FOR SOLUTION ORAL at 17:13

## 2018-08-28 NOTE — ED TRIAGE NOTES
Triage note: Patient c/o epigastric \"discomfort\" 6/10 beginning two days ago accompanied by malaise. Saw by Dr. Melva Méndez this morning and referred to ED for further evaluation. Patient recently admitted to [facility] [place].

## 2018-08-28 NOTE — H&P
[facility] HISTORY AND PHYSICAL Anahi Monk 
MR#: [id number] : 08/10/1926 ACCOUNT #: [de-identified] ADMIT DATE: 2018 CHIEF COMPLAINT:  A delightful 24-year-old white female admitted with diffuse weakness. HISTORY OF PRESENT ILLNESS:  One week ago, she suffered NSTEMI with Takotsubo cardiomyopathy. Her peak troponin I was 0.5. Her cardiac catheterization revealed minimal LAD disease, EF 45-50%, closer to 50%, with apical akinesis. She was discharged home on 2018. In the ensuing days, she initially did well, but then ate salty food 2 days prior to admission and gained 3 pounds which she lost by today. Both yesterday and today at rest, she has had some localized chest discomfort that was relieved with burping. She complained of worsening diffuse weakness, presented to my office, and was referred to the emergency room where troponin I was less than 0.5. EKG showed normal sinus rhythm with a new left bundle-branch block. She was seen by Keith Alvarez MD, cardiologist, who recommended diuresis. She was admitted for further evaluation and treatment. PAST MEDICAL HISTORY:  See above. NSTEMI, 2018, with Takotsubo cardiomyopathy, an acute ischemic colitis hospitalization, paroxysmal AFib, sick sinus syndrome, moderate, right greater than left, carotid stenosis, GERD, hypertension, prior episode in  of left lower quadrant abdominal pain thought secondary to spasm, hypercholesterolemia, osteoporosis, peripheral vascular disease. Echo 2017 : mild to moderate Mitral Regurgitation PAST SURGICAL HISTORY:  Colonoscopy in , diverticulosis, hemorrhoids, cholecystectomy, hysterectomy in , left leg arterial stenting, tonsillectomy.  
 
MEDICATIONS:  Align 4 mg daily, Flagyl 500 mg b.i.d., Norco 1 p.o. b.i.d. p.r.n., doxazosin 0.5 mg at bedtime, Protonix 40 mg at bedtime, Norvasc 2.5 mg daily p.r.n. systolic blood pressure greater than 160, lorazepam 0.5 mg p.o. b.i.d. p.r.n., Saline [place] 0.65% nasal spray 2 sprays both nostrils every 2 hours as needed, Norvasc 5 mg p.o. daily scheduled, Mevacor 20 mg at bedtime, lisinopril 20 mg daily, MiraLax 17 grams p.o. in water daily as needed for constipation, flecainide 100 mg p.o. b.i.d., Flonase 2 sprays in both nostrils daily as needed for rhinitis, loratadine 10 mg p.o. daily p.r.n. nasal congestion, Narcan 2 mg per actuation spray 1 spray each nostril as needed for overdose. ALLERGIES:  BETA BLOCKERS DUE TO SICK SINUS SYNDROME. AMOXICILLIN, CODEINE, DONNATAL, EXELON, NAUSEA. PNEUMOVAX 23, ARM SWELLING. PLAVIX, RASH. Lavell Hallmark. FAMILY HISTORY:  Noncontributory. SOCIAL HISTORY:  Negative smoking, negative ETOH abuse. REVIEW OF SYSTEMS:  Feels constipated. Denies recent bowel movement. With her chest discomfort, she denied associated dizziness, dyspnea, or diaphoresis. She denies urinary difficulties. For the past 9-10 months, has had left ankle edema. PHYSICAL EXAMINATION: 
VITAL SIGNS:  Temperature 97.9, pulse 70, /67, respiratory rate 18, O2 sat 96%, weight 124 pounds. HEENT:  Negative. Her throat is clear. EOMI. PERRLA. NECK:  JVD is flat at 45 degrees. No carotid bruits. LUNGS:  Clear. HEART:  Regular, 2/6 systolic murmur not heard in the carotids. ABDOMEN:  Bowel sounds positive, soft, no mass felt, nondistended, nontender. EXTREMITIES:  Left greater than right trace pretibial edema, 2+ PT pulses bilaterally. NEUROLOGIC:  Nonfocal. 
 
DATA:  EKG:  NSR, first-degree AV block, new left bundle-branch block. Electrolytes normal, potassium level 3.8, glucose 85. Troponin I less than 0.05. WBC 8.4, hemoglobin 12.8, platelet count [phone], normal differential. 
 
ASSESSMENT: 
1. Acute combined systolic and diastolic congestive heart failure. 2.  Recent Takotsubo cardiomyopathy secondary to an acute non-ST elevation myocardial infarction 1 week ago. 3.  Hypertension. 4.  History of peripheral vascular disease. 5.  History of hyperlipidemia. Non HDL 74 on 6/6/18 in my office 6. Minimal coronary artery disease by cardiac catheterization last week. 7. History of Mild to Moderate MR on Echo 2017 PLAN:  Admission to telemetry. Diuresis with Lasix 20 mg IV q. 12.  Cardiology consult  appreciated. Followup troponin I reading. Followup echocardiogram     Followup electrolytes. .  Hypertension. We will follow blood pressure closely. Continue current treatment including ACE inhibitor and, for now, continue Norvasc , and Cardura has been ordered as well. Full code but does not wish to linger in a medically futile situation  I spoke with the patient's granddaughter who was present. MD MAYRA Fitch/JONES 
D: 08/28/2018 16:22    
T: 08/28/2018 16:58 
JOB #: [id number]

## 2018-08-28 NOTE — IP AVS SNAPSHOT
[address] [facility] [address] [place] [address] 
[phone] Patient: Lorena Peck MRN: [id number] :8/10/1926 A check mariza indicates which time of day the medication should be taken. My Medications START taking these medications Instructions Each Dose to Equal  
 Morning Noon Evening Bedtime  
 carvedilol 3.125 mg tablet Commonly known as:  Elana Dome Your last dose was: Your next dose is: Take 1 Tab by mouth two (2) times daily (with meals). 3.125 mg  
    
   
   
   
  
 furosemide 20 mg tablet Commonly known as:  LASIX Your last dose was: Your next dose is: One pill each morning or as directed CHANGE how you take these medications Instructions Each Dose to Equal  
 Morning Noon Evening Bedtime  
 amLODIPine 2.5 mg tablet Commonly known as:  Med Ortiz What changed:  Another medication with the same name was removed. Continue taking this medication, and follow the directions you see here. Your last dose was: Your next dose is: Take 1 Tab by mouth as needed (SBP > 160). 2.5 mg  
    
   
   
   
  
  
CONTINUE taking these medications Instructions Each Dose to Equal  
 Morning Noon Evening Bedtime ALIGN 4 mg Cap Generic drug:  Bifidobacterium Infantis Your last dose was: Your next dose is: Take 1 Cap by mouth daily. 1 Cap  
    
   
   
   
  
 aspirin delayed-release 81 mg tablet Your last dose was: Your next dose is: Take 81 mg by mouth daily. 81 mg CLARITIN 10 mg tablet Generic drug:  loratadine Your last dose was: Your next dose is: Take 10 mg by mouth daily as needed. 10 mg  
    
   
   
   
  
 flecainide 100 mg tablet Commonly known as:  TAMBOCOR Your last dose was: Your next dose is: Take 100 mg by mouth two (2) times a day. 100 mg FLONASE 50 mcg/actuation nasal spray Generic drug:  fluticasone Your last dose was: Your next dose is: 2 Sprays by Both Nostrils route as needed for Rhinitis. 2 Spray HYDROcodone-acetaminophen 5-325 mg per tablet Commonly known as:  Camelia Peralta Your last dose was: Your next dose is: Take 1 Tab by mouth two (2) times daily as needed for Pain. Max Daily Amount: 2 Tabs. Indications: Pain 1 Tab  
    
   
   
   
  
 lisinopril 20 mg tablet Commonly known as:  Kylee Gonzalez Your last dose was: Your next dose is: TAKE 1 TABLET DAILY LORazepam 0.5 mg tablet Commonly known as:  ATIVAN Your last dose was: Your next dose is: Take 1 Tab by mouth two (2) times daily as needed for Anxiety. Max Daily Amount: 1 mg. 0.5 mg  
    
   
   
   
  
 lovastatin 20 mg tablet Commonly known as:  MEVACOR Your last dose was: Your next dose is: TAKE 1 TABLET DAILY MIRALAX 17 gram/dose powder Generic drug:  polyethylene glycol Your last dose was: Your next dose is: Take 17 g by mouth daily. As needed 17 g  
    
   
   
   
  
 naloxone 2 mg/actuation Spry Commonly known as:  [employer] Your last dose was: Your next dose is:    
   
   
 1 spray  into 1 nostril. Use a new one for next spray. put  into alternating nostrils. May repeat every 2 to 3 minutes as needed. pantoprazole 40 mg tablet Commonly known as:  PROTONIX Your last dose was: Your next dose is: TAKE 1 TABLET DAILY  
     
   
   
   
  
 sodium chloride 0.65 % nasal squeeze bottle Commonly known as:  OCEAN Your last dose was: Your next dose is: 2 Sprays by Both Nostrils route every two (2) hours as needed for Congestion. 2 Spray STOP taking these medications   
 doxazosin 1 mg tablet Commonly known as:  CARDURA  
   
  
 metroNIDAZOLE 500 mg tablet Commonly known as:  FLAGYL Where to Get Your Medications These medications were sent to [id number] [facility] [facility] AT [address], [address] [facility] Hours:  24-hours Phone:  [phone]  
  carvedilol 3.125 mg tablet  
 furosemide 20 mg tablet

## 2018-08-28 NOTE — PROGRESS NOTES
Admission Medication Reconciliation: 
 
Information obtained from: Kari Worrell (RN, grand-daughter), daughter, patient, Gautam Jones Significant PMH/Disease States:  
Past Medical History:  
Diagnosis Date  Acute ischemic colitis ([facility]  Acute non-ST elevation myocardial infarction (NSTEMI) ([facility] 8/24/2018 8/21/18  Arrhythmia Paroxysmal  Afib,  SSS  Carotid stenosis, bilateral   
 moderate , right >left  GERD (gastroesophageal reflux disease) 12/10/2011  
 HTN (hypertension) 12/8/2011  HX OTHER MEDICAL 2011  
 cardiac cath ;non obstructive CAD. Renal artery stents; open [address] 2011 LLQ abd. pain thought due to spasm.  Hypercholesterolemia  Hypertension  Osteoporosis  Osteoporosis 12/8/2011  PAF (paroxysmal atrial fibrillation) ([facility] 12/8/2011  PVD (peripheral vascular disease) ([facility]  Rhinitis 12/8/2011  
 SSS (sick sinus syndrome) ([facility] 12/8/2011  Takotsubo cardiomyopathy 8/23/2018 Chief Complaint for this Admission:  Chest pain, epigastric pain Allergies:  Beta blocker [beta-blockers (beta-adrenergic blocking agts)]; Amoxicillin; Codeine; Donnatal [phenobarb-hyoscy-atropine-scop]; Exelon [rivastigmine]; Other medication; Plavix [clopidogrel]; and Sular [nisoldipine] Prior to Admission Medications:  
Prior to Admission Medications Prescriptions Last Dose Informant Patient Reported? Taking? Bifidobacterium Infantis (ALIGN) 4 mg cap 8/28/2018 at Unknown time  Yes Yes Sig: Take 1 Cap by mouth daily. HYDROcodone-acetaminophen (NORCO) 5-325 mg per tablet 8/28/2018 at Unknown time  No Yes Sig: Take 1 Tab by mouth two (2) times daily as needed for Pain. Max Daily Amount: 2 Tabs. Indications: Pain LORazepam (ATIVAN) 0.5 mg tablet 8/27/2018 at Unknown time  No Yes Sig: Take 1 Tab by mouth two (2) times daily as needed for Anxiety. Max Daily Amount: 1 mg. amLODIPine (NORVASC) 2.5 mg tablet 8/21/2018 at Unknown time  No Yes Sig: Take 1 Tab by mouth as needed (SBP > 160). amLODIPine (NORVASC) 5 mg tablet 2018 at Unknown time  No Yes Sig: TAKE 1 TABLET DAILY  
aspirin delayed-release 81 mg tablet 2018 at Unknown time  Yes Yes Sig: Take 81 mg by mouth daily. doxazosin (CARDURA) 1 mg tablet 2018 at Unknown time  No Yes Sig: TAKE ONE-HALF (1/2) TABLET AT BEDTIME  
flecainide (TAMBOCOR) 100 mg tablet 2018 at Unknown time  Yes Yes Sig: Take 100 mg by mouth two (2) times a day. fluticasone (FLONASE) 50 mcg/actuation nasal spray 2018 at Unknown time  Yes Yes Si Sprays by Both Nostrils route as needed for Rhinitis. lisinopril (PRINIVIL, ZESTRIL) 20 mg tablet 2018 at Unknown time  No Yes Sig: TAKE 1 TABLET DAILY  
loratadine (CLARITIN) 10 mg tablet 2018 at Unknown time  Yes Yes Sig: Take 10 mg by mouth daily as needed. lovastatin (MEVACOR) 20 mg tablet 2018 at Unknown time  No Yes Sig: TAKE 1 TABLET DAILY  
metroNIDAZOLE (FLAGYL) 500 mg tablet 2018 at Unknown time  No Yes Sig: Take 1 Tab by mouth two (2) times a day for 5 days. Indications: Infectious Disease of Abdomen  
naloxone (NARCAN) 2 mg/actuation spry   No No  
Si spray  into 1 nostril. Use a new one for next spray. put  into alternating nostrils. May repeat every 2 to 3 minutes as needed. pantoprazole (PROTONIX) 40 mg tablet 2018 at Unknown time  No Yes Sig: TAKE 1 TABLET DAILY polyethylene glycol (MIRALAX) 17 gram/dose powder 2018 at Unknown time  Yes Yes Sig: Take 17 g by mouth daily. As needed  
sodium chloride (OCEAN) 0.65 % nasal spray 2018 at Unknown time  No Yes Si Sprays by Both Nostrils route every two (2) hours as needed for Congestion. Facility-Administered Medications: None Comments/Recommendations: Patient was in procedure but list was reviewed with her once she returned, and administration times updated as well. Allergies reviewed, no changes. Note: 1, Metronidazole: for \"infectious disease of abdomen\", started Saturday (recently discharged) Added: 1. Probiotic Deleted: 1. Zofran (\"makes me feel funny\") Thank you for allowing me to participate in the care of your patient. Leah Huizar PharmD, RN #[id number]

## 2018-08-28 NOTE — ED PROVIDER NOTES
HPI Comments: 80 y.o. female with past medical history significant for hypertension, sick sinus syndrome, paroxysmal atrial fibrillation, Takotsubo cardiomyopathy who presents from PCP's office, accompanied by daughter, with chief complaint of chest pain. Patient woke up this morning 6.5 hours ago with \"aching\" left upper chest pain that lasted a couple hours before resolving completely. Patient states pain improved with belching. She saw PCP and was referred to ED. Patient is compliant with her medications today. Patient denies any chest pain or symptoms at this time. Patient denies dizziness, shortness of breath, diaphoresis, nausea, vomiting, fever. There are no other acute medical concerns at this time. Social hx: Nonsmoker PCP: Irineo Sadler MD 
Cardiology: Dr. Dom Baltazar Note written by Abel Bee. Tawana Leal, as dictated by Maame Mckeon MD 12:30 PM 
 
12:44 PM 
Maame Mckeon MD reviewed electronic medical record system for any past medical records that were available that may contribute to the patient's current condition. Patient was admitted last week for abdominal pain and history of ischemic bowel. Trop peaked at 0.5, EF 45-50%, minimal LAD disease. The history is provided by the patient. Past Medical History:  
Diagnosis Date  Acute ischemic colitis ([facility]  Acute non-ST elevation myocardial infarction (NSTEMI) ([facility] 8/24/2018 8/21/18  Arrhythmia Paroxysmal  Afib,  SSS  Carotid stenosis, bilateral   
 moderate , right >left  GERD (gastroesophageal reflux disease) 12/10/2011  
 HTN (hypertension) 12/8/2011  HX OTHER MEDICAL 2011  
 cardiac cath ;non obstructive CAD. Renal artery stents; open [address] 2011 LLQ abd. pain thought due to spasm.  Hypercholesterolemia  Hypertension  Osteoporosis  Osteoporosis 12/8/2011  PAF (paroxysmal atrial fibrillation) ([facility] 12/8/2011  PVD (peripheral vascular disease) ([facility]  Rhinitis 12/8/2011  
 SSS (sick sinus syndrome) ([facility] 12/8/2011  Takotsubo cardiomyopathy 8/23/2018 Past Surgical History:  
Procedure Laterality Date  ENDOSCOPY, COLON, DIAGNOSTIC  3/2/11  
 diverticulosis,hemorrhoids  HX CHOLECYSTECTOMY  HX HYSTERECTOMY 1960  HX OTHER SURGICAL    
 stent left leg  HX TONSILLECTOMY Family History:  
Problem Relation Age of Onset  Hypertension Mother Social History Social History  Marital status:  Spouse name: N/A  
 Number of children: N/A  
 Years of education: N/A Occupational History  Not on file. Social History Main Topics  Smoking status: Never Smoker  Smokeless tobacco: Never Used  Alcohol use No  
 Drug use: No  
 Sexual activity: Not on file Other Topics Concern  Not on file Social History Narrative ALLERGIES: Beta blocker [beta-blockers (beta-adrenergic blocking agts)]; Amoxicillin; Codeine; Donnatal [phenobarb-hyoscy-atropine-scop]; Exelon [rivastigmine]; Other medication; Plavix [clopidogrel]; and Sular [nisoldipine] Review of Systems Constitutional: Negative for diaphoresis and fever. Respiratory: Negative for shortness of breath. Cardiovascular: Positive for chest pain. Gastrointestinal: Negative for nausea and vomiting. Neurological: Negative for dizziness. All other systems reviewed and are negative. Vitals:  
 08/28/18 1141 BP: 174/67 Pulse: 70 Resp: 18 Temp: 97.9 °F (36.6 °C) SpO2: 96% Weight: 56.2 kg (124 lb) Physical Exam  
Nursing note and vitals reviewed. Constitutional: appears well-developed and well-nourished. No distress. ELDERLY. APPEARS YOUNGER THAN STATED AGE. HENT:  
Head: Normocephalic and atraumatic. Sclera anicteric Nose: No rhinorrhea Mouth/Throat: Oropharynx is clear and moist. Pharynx normal 
 Eyes: Conjunctivae are normal. Pupils are equal, round, and reactive to light. Right eye exhibits no discharge. Left eye exhibits no discharge. No scleral icterus. Neck: Painless normal range of motion. Supple Cardiovascular: Normal rate, regular rhythm, normal heart sounds and intact distal pulses. Exam reveals no gallop and no friction rub. No murmur heard. Pulmonary/Chest: Effort normal and breath sounds normal. No respiratory distress. no wheezes. no rales. Abdominal: Soft. Bowel sounds are normal. Exhibits no distension and no mass. No tenderness. No guarding. Musculoskeletal: Normal range of motion. no tenderness. No edema Lymphadenopathy:   No cervical adenopathy. Neurological:  Alert and oriented to person, place, and time. Coordination normal. CN 2-12 intact. Moving all extremities. Skin: Skin is warm and dry. No rash noted. No pallor. Note written by Carolina Limon. Afshan Quintero, as dictated by Milana Taveras MD 12:35 PM 
 
 
MDM 
 
70-year-old female sent from primary care physician's office for chest pain.  Patient recent admission for Takosbuto cardiomyopathy.  Patient with left upper chest pain. Differential diagnosis includes MI, PE, cardiomyopathy and others.  Will check chest x-ray, lab work, consult cardiology. ED Course Procedures ED EKG interpretation: 
Rhythm: normal sinus rhythm; and regular . Rate (approx.): 75; Axis: left axis deviation; LBBB; T wave inversion in I and aVL. Negative for Sgarbossa criteria. When compared with EKG dated 8/21/18, mildly increased QRS prolongation. Note written by Carolina Limon. Afshan Quintero, as dictated by Milana Taveras MD 12:24 PM 
 
2:57 PM 
Dr. Berta Lee is at the bedside evaluating patient. 3:18 PM 
Dr. Berta Lee has seen and evaluated, spoke with PCP who will admit. [address] [facility] [address] Cat Booth in ED to admit patient. D dimer pending.   Milana Taveras MD

## 2018-08-28 NOTE — Clinical Note
Status[de-identified] Inpatient [101] Type of Bed: Telemetry [19] Inpatient Hospitalization Certified Necessary for the Following Reasons: 1. Patient Failed outpatient treatment (further clarification in H&P documentation) Admitting Diagnosis: CHF (congestive heart failure), NYHA class III, acute, combined ([facility] [[id number]] Admitting Physician: Rai Jones, [facility] Attending Physician: Tracey Hall Estimated Length of Stay: 3-4 Midnights Discharge Plan[de-identified] Home with Office Follow-up

## 2018-08-28 NOTE — CONSULTS
Cardiology Consult Note    CC: CP/SOB  Reason for consult:  CHF  Requesting MD:  Dr. Venkat Katz     Subjective:      Date of  Admission: 8/28/2018 12:01 PM     Admission type:Emergency    Colleen Weir is a 80 y.o. female admitted for SOB/CP. She woke up upper chest pressure like someone is pressing on her chest with cough and SOB. She feels ache and weak. Two days ago she went to [employer] and next am her ankle swollen and she was more SOB. Her cardiac data include recent NSTEMI/Takotsubo cardiomyopathy, diagnosed last week when she presented with CP but no SOB or ankle swelling. Her cardiac cath showed minimal CAD, EF 45 to 50% with apical akinesis.     Patient Active Problem List    Diagnosis Date Noted    Acute non-ST elevation myocardial infarction (NSTEMI) ([address] 08/24/2018    Takotsubo cardiomyopathy 08/23/2018    Acute colitis 01/13/2018    Syncope 04/25/2017    PVCs (premature ventricular contractions) 04/14/2017    Vasovagal near syncope 10/14/2015    Non-sustained ventricular tachycardia ([address] 10/14/2015    UTI (lower urinary tract infection) 10/14/2015    Unsteady gait 10/14/2015    VBI (vertebrobasilar insufficiency) 10/14/2015    Labile essential hypertension 10/14/2015    Functional gait abnormality 10/14/2015    Fever and chills 04/28/2015    Acute respiratory failure with hypoxemia ([address] 04/28/2015    Thrush, oral 03/07/2015    Left sided colitis with rectal bleeding ([address] 03/05/2015    Diarrhea 03/04/2015    Colitis 03/04/2015    Volume depletion, gastrointestinal loss 03/04/2015    Abdominal pain, acute, generalized 03/04/2015    Diverticulitis of colon (without mention of hemorrhage)(562.11) 10/22/2013    Ischemic colitis ([address] 10/21/2013    Gastritis 10/21/2013    Peripheral vascular disease ([address] 06/06/2012    Carotid stenosis, bilateral 12/10/2011    GERD (gastroesophageal reflux disease) 12/10/2011    Abdominal pain 12/10/2011    HTN (hypertension) 12/08/2011    SSS (sick sinus syndrome) ([facility] 12/08/2011    PAF (paroxysmal atrial fibrillation) ([facility] 12/08/2011    Hypercholesterolemia 12/08/2011    Osteoporosis 12/08/2011    Rhinitis 12/08/2011      KIMBERLY Barker MD  Past Medical History:   Diagnosis Date    Acute ischemic colitis ([facility]     Acute non-ST elevation myocardial infarction ([facility]STEMI) ([facility] 8/24/2018 8/21/18     Arrhythmia     Paroxysmal  Afib,  SSS    Carotid stenosis, bilateral     moderate , right >left    GERD (gastroesophageal reflux disease) 12/10/2011    HTN (hypertension) 12/8/2011    HX OTHER MEDICAL 2011    cardiac cath ;non obstructive CAD. Renal artery stents; open    HX OTHER MEDICAL 2011    LLQ abd. pain thought due to spasm.  Hypercholesterolemia     Hypertension     Osteoporosis     Osteoporosis 12/8/2011    PAF (paroxysmal atrial fibrillation) ([facility] 12/8/2011    PVD (peripheral vascular disease) ([facility]     Rhinitis 12/8/2011    SSS (sick sinus syndrome) ([facility] 12/8/2011    Takotsubo cardiomyopathy 8/23/2018      Past Surgical History:   Procedure Laterality Date    ENDOSCOPY, COLON, DIAGNOSTIC  3/2/11    diverticulosis,hemorrhoids    HX CHOLECYSTECTOMY      HX HYSTERECTOMY      1960    HX OTHER SURGICAL      stent left leg    HX TONSILLECTOMY       Allergies   Allergen Reactions    Beta Blocker [Beta-Blockers (Beta-Adrenergic Blocking Agts)] Other (comments)     Sick sinus syndrome      Amoxicillin Nausea and Vomiting    Codeine Nausea Only    Donnatal [Phenobarb-Hyoscy-Atropine-Scop] Unknown (comments)    Exelon [Rivastigmine] Other (comments)     Nausea     Other Medication Other (comments)     Pneumovax 23 . Arm swelling     Plavix [Clopidogrel] Rash    Sular [Nisoldipine] Vertigo      Family History   Problem Relation Age of Onset    Hypertension Mother       No current facility-administered medications for this encounter.       Current Outpatient Prescriptions   Medication Sig    Bifidobacterium Infantis (ALIGN) 4 mg cap Take 1 Cap by mouth daily.  metroNIDAZOLE (FLAGYL) 500 mg tablet Take 1 Tab by mouth two (2) times a day for 5 days. Indications: Infectious Disease of Abdomen    HYDROcodone-acetaminophen (NORCO) 5-325 mg per tablet Take 1 Tab by mouth two (2) times daily as needed for Pain. Max Daily Amount: 2 Tabs. Indications: Pain    doxazosin (CARDURA) 1 mg tablet TAKE ONE-HALF (1/2) TABLET AT BEDTIME    pantoprazole (PROTONIX) 40 mg tablet TAKE 1 TABLET DAILY    amLODIPine (NORVASC) 2.5 mg tablet Take 1 Tab by mouth as needed (SBP > 160).  LORazepam (ATIVAN) 0.5 mg tablet Take 1 Tab by mouth two (2) times daily as needed for Anxiety. Max Daily Amount: 1 mg.  sodium chloride (OCEAN) 0.65 % nasal spray 2 Sprays by Both Nostrils route every two (2) hours as needed for Congestion.  amLODIPine (NORVASC) 5 mg tablet TAKE 1 TABLET DAILY    lovastatin (MEVACOR) 20 mg tablet TAKE 1 TABLET DAILY    lisinopril (PRINIVIL, ZESTRIL) 20 mg tablet TAKE 1 TABLET DAILY    aspirin delayed-release 81 mg tablet Take 81 mg by mouth daily.  polyethylene glycol (MIRALAX) 17 gram/dose powder Take 17 g by mouth daily. As needed    flecainide (TAMBOCOR) 100 mg tablet Take 100 mg by mouth two (2) times a day.  fluticasone (FLONASE) 50 mcg/actuation nasal spray 2 Sprays by Both Nostrils route as needed for Rhinitis.  loratadine (CLARITIN) 10 mg tablet Take 10 mg by mouth daily as needed.  naloxone (NARCAN) 2 mg/actuation spry 1 spray  into 1 nostril. Use a new one for next spray. put  into alternating nostrils. May repeat every 2 to 3 minutes as needed. Prior to Admission Medications:  Prior to Admission medications    Medication Sig Start Date End Date Taking? Authorizing Provider   Bifidobacterium Infantis (ALIGN) 4 mg cap Take 1 Cap by mouth daily. Yes Historical Provider   metroNIDAZOLE (FLAGYL) 500 mg tablet Take 1 Tab by mouth two (2) times a day for 5 days. Indications: Infectious Disease of Abdomen 8/24/18 8/29/18 Yes Cynda Signs IV, MD   HYDROcodone-acetaminophen [facility]) 5-325 mg per tablet Take 1 Tab by mouth two (2) times daily as needed for Pain. Max Daily Amount: 2 Tabs. Indications: Pain 8/16/18  Yes Cynda Signs IV, MD   doxazosin (CARDURA) 1 mg tablet TAKE ONE-HALF (1/2) TABLET AT BEDTIME 6/30/18  Yes Cynda Signs IV, MD   pantoprazole (PROTONIX) 40 mg tablet TAKE 1 TABLET DAILY 6/2/18  Yes KIMBERLY Novak IV, MD   amLODIPine (NORVASC) 2.5 mg tablet Take 1 Tab by mouth as needed (SBP > 160). 3/19/18  Yes Cynda Signs IV, MD   LORazepam (ATIVAN) 0.5 mg tablet Take 1 Tab by mouth two (2) times daily as needed for Anxiety. Max Daily Amount: 1 mg. 3/9/18  Yes Cynda Signs IV, MD   sodium chloride (OCEAN) 0.65 % nasal spray 2 Sprays by Both Nostrils route every two (2) hours as needed for Congestion. 1/15/18  Yes Cynda Signs IV, MD   amLODIPine (NORVASC) 5 mg tablet TAKE 1 TABLET DAILY 8/13/17  Yes Cynda Signs IV, MD   lovastatin (MEVACOR) 20 mg tablet TAKE 1 TABLET DAILY 8/2/17  Yes Cynda Signs IV, MD   lisinopril (PRINIVIL, ZESTRIL) 20 mg tablet TAKE 1 TABLET DAILY 7/2/17  Yes Cynda Signs IV, MD   aspirin delayed-release 81 mg tablet Take 81 mg by mouth daily. Yes Historical Provider   polyethylene glycol (MIRALAX) 17 gram/dose powder Take 17 g by mouth daily. As needed   Yes Historical Provider   flecainide (TAMBOCOR) 100 mg tablet Take 100 mg by mouth two (2) times a day. Yes Historical Provider   fluticasone (FLONASE) 50 mcg/actuation nasal spray 2 Sprays by Both Nostrils route as needed for Rhinitis. Yes Historical Provider   loratadine (CLARITIN) 10 mg tablet Take 10 mg by mouth daily as needed. Yes Historical Provider   naloxone [facility]) 2 mg/actuation spry 1 spray  into 1 nostril. Use a new one for next spray. put  into alternating nostrils.  May repeat every 2 to 3 minutes as needed. 3/19/18   Leatha Ortiz IV, MD        Review of Symptoms:  Except as noted in HPI, patient denies recent fever or chills, nausea, vomiting, diarrhea, hemoptysis, hematemesis, dysuria, myalgias, focal neurologic symptoms, ecchymosis, angioedema, odynophagia, dysphagia, sore throat, earache,rash, melena, hematochezia, depression, GERD, cold intolerance, petechia, bleeding gums, or significant weight loss. A comprehensive review of systems was negative except for that written in the HPI. Subjective:    24 hr VS reviewed, overall VSSAF  Temp (24hrs), Av.9 °F (36.6 °C), Min:97.9 °F (36.6 °C), Max:97.9 °F (36.6 °C)    Patient Vitals for the past 8 hrs:   Pulse   18 1141 70    Patient Vitals for the past 8 hrs:   Resp   18 1141 18    Patient Vitals for the past 8 hrs:   BP   18 1141 174/67        No intake or output data in the 24 hours ending 18 1511      Physical Exam (complete single organ system exam)    Cons: The patient is no distress. Appears stated age. HEENT: Normal conjunctivae and palate. No xanthelasma. Neck: Flat JVP without appreciable HJR. Resp: Normal respiratory effort with clear lungs bilaterally. CV: Regular rate and rhythm. PMI not palpated. Normal S1,S2  No gallop or rubs appreciated. No murmur apprciated. Intact carotid upstroke bilaterally without appreciated bruits. Abdominal aorta not palpated; no abdominal bruit noted. Normal femoral pulses without bruits. Intact pedal pulses. No peripheral edema. GI: No abd mass noted, soft; no organomegaly noted. Bowel sounds present. Muscular:  No significant kyphosis. Strength WNL for age. Ext: No cyanosis, clubbing, or stigmata of peripheral embolization. Derm: No ulcers or stasis dermatitis of lower extremities. Neuro: Alert and oriented x 3;  Grossly non-focal. Normal mood and affect.        Visit Vitals    /67 (BP 1 Location: Left arm, BP Patient Position: At rest;Sitting)  Pulse 70    Temp 97.9 °F (36.6 °C)    Resp 18    Wt 56.2 kg (124 lb)    SpO2 96%    BMI 20.63 kg/m2     General Appearance:  Well developed, well nourished,alert and oriented x 3, and individual in no acute distress. Ears/Nose/Mouth/Throat:   Hearing grossly normal.         Neck: Supple. Chest:   Lungs rales worse at Lt base than Rt   Cardiovascular:  Regular rate and rhythm, S1, S2 normal, no murmur. Abdomen:   Soft, non-tender, bowel sounds are active. Extremities: 2+ edema bilaterally. Skin: Warm and dry.                Cardiographics    Telemetry: normal sinus rhythm, LBBB  ECG: sinus with new LBBB/first degree AVB  Echocardiogram: Not done    Labs:   Recent Results (from the past 24 hour(s))   EKG, 12 LEAD, INITIAL    Collection Time: 08/28/18 11:46 AM   Result Value Ref Range    Ventricular Rate 75 BPM    Atrial Rate 75 BPM    P-R Interval 220 ms    QRS Duration 160 ms    Q-T Interval 444 ms    QTC Calculation (Bezet) 495 ms    Calculated P Axis 55 degrees    Calculated R Axis -54 degrees    Calculated T Axis 94 degrees    Diagnosis       Sinus rhythm with 1st degree AV block with occasional premature ventricular   complexes  Left axis deviation  Left bundle branch block  When compared with ECG of 21-AUG-2018 13:21,  IN interval has increased  Left bundle branch block is now present  Criteria for Anteroseptal infarct are no longer present     CBC WITH AUTOMATED DIFF    Collection Time: 08/28/18 12:52 PM   Result Value Ref Range    WBC 8.4 3.6 - 11.0 K/uL    RBC 4.21 3.80 - 5.20 M/uL    HGB 12.8 11.5 - 16.0 g/dL    HCT 39.4 35.0 - 47.0 %    MCV 93.6 80.0 - 99.0 FL    MCH 30.4 26.0 - 34.0 PG    MCHC 32.5 30.0 - 36.5 g/dL    RDW 13.2 11.5 - 14.5 %    PLATELET 400 467 - 173 K/uL    MPV 11.0 8.9 - 12.9 FL    NRBC 0.0 0  WBC    ABSOLUTE NRBC 0.00 0.00 - 0.01 K/uL    NEUTROPHILS 72 32 - 75 %    LYMPHOCYTES 18 12 - 49 %    MONOCYTES 9 5 - 13 %    EOSINOPHILS 1 0 - 7 %    BASOPHILS 1 0 - 1 % IMMATURE GRANULOCYTES 0 0.0 - 0.5 %    ABS. NEUTROPHILS 6.0 1.8 - 8.0 K/UL    ABS. LYMPHOCYTES 1.5 0.8 - 3.5 K/UL    ABS. MONOCYTES 0.7 0.0 - 1.0 K/UL    ABS. EOSINOPHILS 0.1 0.0 - 0.4 K/UL    ABS. BASOPHILS 0.1 0.0 - 0.1 K/UL    ABS. IMM. GRANS. 0.0 0.00 - 0.04 K/UL    DF AUTOMATED     METABOLIC PANEL, BASIC    Collection Time: 08/28/18 12:52 PM   Result Value Ref Range    Sodium 139 136 - 145 mmol/L    Potassium 3.8 3.5 - 5.1 mmol/L    Chloride 106 97 - 108 mmol/L    CO2 27 21 - 32 mmol/L    Anion gap 6 5 - 15 mmol/L    Glucose 85 65 - 100 mg/dL    BUN 11 6 - 20 MG/DL    Creatinine 0.72 0.55 - 1.02 MG/DL    BUN/Creatinine ratio 15 12 - 20      GFR est AA >60 >60 ml/min/1.73m2    GFR est non-AA >60 >60 ml/min/1.73m2    Calcium 8.8 8.5 - 10.1 MG/DL   TROPONIN I    Collection Time: 08/28/18 12:52 PM   Result Value Ref Range    Troponin-I, Qt. <0.05 <0.05 ng/mL   SAMPLES BEING HELD    Collection Time: 08/28/18 12:52 PM   Result Value Ref Range    SAMPLES BEING HELD 1RED,1BLUE     COMMENT        Add-on orders for these samples will be processed based on acceptable specimen integrity and analyte stability, which may vary by analyte. Assessment:     Assessment:   CP; could still be due to recent Takotsubo cardiomyopathy; enzyme is negative  SOB; due to acute combined systolic and diastolic HF;   Takotsubo cardiomyopathy  Recent NSTEMI ; due to stress heart syndrome  PVD  H/o SSS     Plan:   Admit to tele  Serial enzymes  Echo  IV diuretic  Trial of small dose Coreg  Continue to other regimen    Zana Shelton MD

## 2018-08-28 NOTE — ROUTINE PROCESS
TRANSFER - OUT REPORT: 
 
Verbal report given to KENDRA Lehman(name) on [address]  being transferred to [facility](unit) for routine progression of care Report consisted of patients Situation, Background, Assessment and  
Recommendations(SBAR). Information from the following report(s) SBAR, Kardex, MAR and Med Rec Status was reviewed with the receiving nurse. Lines:  
Peripheral IV 08/28/18 Right Hand (Active) Site Assessment Clean, dry, & intact 8/28/2018 12:51 PM  
Phlebitis Assessment 0 8/28/2018 12:51 PM  
Infiltration Assessment 0 8/28/2018 12:51 PM  
Dressing Status Clean, dry, & intact 8/28/2018 12:51 PM  
Dressing Type Transparent 8/28/2018 12:51 PM  
Hub Color/Line Status Blue;Flushed 8/28/2018 12:51 PM  
Action Taken Catheter retaped;Blood drawn 8/28/2018 12:51 PM  
  
 
Opportunity for questions and clarification was provided. Patient transported with: 
monitor

## 2018-08-28 NOTE — ED NOTES
11:40 AM 
I have evaluated the patient as the Provider in Triage. I have reviewed Her vital signs and the triage nurse assessment. I have talked with the patient and any available family and advised that I am the provider in triage and have ordered the appropriate study to initiate their work up based on the clinical presentation during my assessment. I have advised that the patient will be accommodated in the Main ED as soon as possible. I have also requested to contact the triage nurse or myself immediately if the patient experiences any changes in their condition during this brief waiting period.  
Katelynn Montanez PA-C

## 2018-08-28 NOTE — IP AVS SNAPSHOT
2700 [facility] [address] [place] [address] 
[phone] Patient: Leia Pekc MRN: [id number] :8/10/1926 About your hospitalization You were admitted on:  2018 You last received care in the:  [facility] 3N TELEMETRY You were discharged on:  2018 Why you were hospitalized Your primary diagnosis was:  Not on File Your diagnoses also included:  Chf (Congestive Heart Failure), Nyha Class Iii, Acute, Combined (Hcc), Htn (Hypertension), Acute Combined Systolic And Diastolic Chf, Nyha Class 3 (Hcc), Other Constipation, Unspecified Severe Protein-Calorie Malnutrition (Hcc) Follow-up Information Follow up With Details Comments Contact Info Ronak Johnston MD   [address] [address] 
[phone] Your Scheduled Appointments 2018  3:15 PM EDT  
ESTABLISHED PATIENT with Cynkanchan Signs IV, Adriana Santamaria [address], [address] [facility] ([address]) [address] [place] [address]  
[phone] Discharge Orders None A check mariza indicates which time of day the medication should be taken. My Medications START taking these medications Instructions Each Dose to Equal  
 Morning Noon Evening Bedtime  
 carvedilol 3.125 mg tablet Commonly known as:  Adalberto Marsha Your last dose was: Your next dose is: Take 1 Tab by mouth two (2) times daily (with meals). 3.125 mg  
    
   
   
   
  
 furosemide 20 mg tablet Commonly known as:  LASIX Your last dose was: Your next dose is: One pill each morning or as directed CHANGE how you take these medications Instructions Each Dose to Equal  
 Morning Noon Evening Bedtime  
 amLODIPine 2.5 mg tablet Commonly known as:  Donnell Jordan What changed:  Another medication with the same name was removed. Continue taking this medication, and follow the directions you see here. Your last dose was: Your next dose is: Take 1 Tab by mouth as needed (SBP > 160). 2.5 mg  
    
   
   
   
  
  
CONTINUE taking these medications Instructions Each Dose to Equal  
 Morning Noon Evening Bedtime ALIGN 4 mg Cap Generic drug:  Bifidobacterium Infantis Your last dose was: Your next dose is: Take 1 Cap by mouth daily. 1 Cap  
    
   
   
   
  
 aspirin delayed-release 81 mg tablet Your last dose was: Your next dose is: Take 81 mg by mouth daily. 81 mg CLARITIN 10 mg tablet Generic drug:  loratadine Your last dose was: Your next dose is: Take 10 mg by mouth daily as needed. 10 mg  
    
   
   
   
  
 flecainide 100 mg tablet Commonly known as:  TAMBOCOR Your last dose was: Your next dose is: Take 100 mg by mouth two (2) times a day. 100 mg FLONASE 50 mcg/actuation nasal spray Generic drug:  fluticasone Your last dose was: Your next dose is: 2 Sprays by Both Nostrils route as needed for Rhinitis. 2 Spray HYDROcodone-acetaminophen 5-325 mg per tablet Commonly known as:  Lina Quiroga Your last dose was: Your next dose is: Take 1 Tab by mouth two (2) times daily as needed for Pain. Max Daily Amount: 2 Tabs. Indications: Pain 1 Tab  
    
   
   
   
  
 lisinopril 20 mg tablet Commonly known as:  Genna Alvares Your last dose was: Your next dose is: TAKE 1 TABLET DAILY LORazepam 0.5 mg tablet Commonly known as:  ATIVAN Your last dose was: Your next dose is: Take 1 Tab by mouth two (2) times daily as needed for Anxiety. Max Daily Amount: 1 mg. 0.5 mg  
    
   
   
   
  
 lovastatin 20 mg tablet Commonly known as:  MEVACOR Your last dose was: Your next dose is: TAKE 1 TABLET DAILY MIRALAX 17 gram/dose powder Generic drug:  polyethylene glycol Your last dose was: Your next dose is: Take 17 g by mouth daily. As needed 17 g  
    
   
   
   
  
 naloxone 2 mg/actuation Spry Commonly known as:  [employer] Your last dose was: Your next dose is:    
   
   
 1 spray  into 1 nostril. Use a new one for next spray. put  into alternating nostrils. May repeat every 2 to 3 minutes as needed. pantoprazole 40 mg tablet Commonly known as:  PROTONIX Your last dose was: Your next dose is: TAKE 1 TABLET DAILY  
     
   
   
   
  
 sodium chloride 0.65 % nasal squeeze bottle Commonly known as:  OCEAN Your last dose was: Your next dose is: 2 Sprays by Both Nostrils route every two (2) hours as needed for Congestion. 2 Spray STOP taking these medications   
 doxazosin 1 mg tablet Commonly known as:  CARDURA  
   
  
 metroNIDAZOLE 500 mg tablet Commonly known as:  FLAGYL Where to Get Your Medications These medications were [facility] to [facility] [address] [place] [address] Heading AT [address] [facility]  [address] [facility] [address], [address] [facility] Hours:  24-hours Phone:  [phone]  
  carvedilol 3.125 mg tablet  
 furosemide 20 mg tablet Opioid Education  Prescription Opioids: What You Need to Know: 
 
Prescription opioids can be used to help relieve moderate-to-severe pain and are often prescribed following a surgery or injury, or for certain health conditions. These medications can be an important part of treatment but also come with serious risks. Opioids are strong pain medicines. Examples include hydrocodone, oxycodone, fentanyl, and morphine. Heroin is an example of an illegal opioid. It is important to work with your health care provider to make sure you are getting the safest, most effective care. WHAT ARE THE RISKS AND SIDE EFFECTS OF OPIOID USE? Prescription opioids carry serious risks of addiction and overdose, especially with prolonged use. An opioid overdose, often marked by slow breathing, can cause sudden death. The use of prescription opioids can have a number of side effects as well, even when taken as directed. · Tolerance-meaning you might need to take more of a medication for the same pain relief · Physical dependence-meaning you have symptoms of withdrawal when the medication is stopped. Withdrawal symptoms can include nausea, sweating, chills, diarrhea, stomach cramps, and muscle aches. Withdrawal can last up to several weeks, depending on which drug you took and how long you took it. · Increased sensitivity to pain · Constipation · Nausea, vomiting, and dry mouth · Sleepiness and dizziness · Confusion · Depression · Low levels of testosterone that can result in lower sex drive, energy, and strength · Itching and sweating RISKS ARE GREATER WITH:      
· History of drug misuse, substance use disorder, or overdose · Mental health conditions (such as depression or anxiety) · Sleep apnea · Older age (72 years or older) · Pregnancy Avoid alcohol while taking prescription opioids. Also, unless specifically advised by your health care provider, medications to avoid include: · Benzodiazepines (such as Xanax or Valium) · Muscle relaxants (such as Soma or Flexeril) · Hypnotics (such as Ambien or Lunesta) · Other prescription opioids KNOW YOUR OPTIONS Talk to your health care provider about ways to manage your pain that don't involve prescription opioids. Some of these options may actually work better and have fewer risks and side effects. Options may include: 
· Pain relievers such as acetaminophen, ibuprofen, and naproxen · Some medications that are also used for depression or seizures · Physical therapy and exercise · Counseling to help patients learn how to cope better with triggers of pain and stress. · Application of heat or cold compress · Massage therapy · Relaxation techniques Be Informed Make sure you know the name of your medication, how much and how often to take it, and its potential risks & side effects. IF YOU ARE PRESCRIBED OPIOIDS FOR PAIN: 
· Never take opioids in greater amounts or more often than prescribed. Remember the goal is not to be pain-free but to manage your pain at a tolerable level. · Follow up with your primary care provider to: · Work together to create a plan on how to manage your pain. · Talk about ways to help manage your pain that don't involve prescription opioids. · Talk about any and all concerns and side effects. · Help prevent misuse and abuse. · Never sell or share prescription opioids · Help prevent misuse and abuse. · Store prescription opioids in a secure place and out of reach of others (this may include visitors, children, friends, and family). · Safely dispose of unused/unwanted prescription opioids: Find your community drug take-back program or your pharmacy mail-back program, or flush them down the toilet, following guidance from the Food and Drug Administration (www.fda.gov/Drugs/ResourcesForYou). · Visit www.cdc.gov/drugoverdose to learn about the risks of opioid abuse and overdose. · If you believe you may be struggling with addiction, tell your health care provider and ask for guidance or call [facility] at 8-803-251-BKEQ. Discharge Instructions DASH Diet: Care Instructions Your Care Instructions The DASH diet is an eating plan that can help lower your blood pressure. DASH stands for Dietary Approaches to Stop Hypertension. Hypertension is high blood pressure. The DASH diet focuses on eating foods that are high in calcium, potassium, and magnesium. These nutrients can lower blood pressure. The foods that are highest in these nutrients are fruits, vegetables, low-fat dairy products, nuts, seeds, and legumes. But taking calcium, potassium, and magnesium supplements instead of eating foods that are high in those nutrients does not have the same effect. The DASH diet also includes whole grains, fish, and poultry. The DASH diet is one of several lifestyle changes your doctor may recommend to lower your high blood pressure. Your doctor may also want you to decrease the amount of sodium in your diet. Lowering sodium while following the DASH diet can lower blood pressure even further than just the DASH diet alone. Follow-up care is a key part of your treatment and safety. Be sure to make and go to all appointments, and call your doctor if you are having problems. It's also a good idea to know your test results and keep a list of the medicines you take. How can you care for yourself at home? Following the DASH diet · Eat 4 to 5 servings of fruit each day. A serving is 1 medium-sized piece of fruit, ½ cup chopped or canned fruit, 1/4 cup dried fruit, or 4 ounces (½ cup) of fruit juice. Choose fruit more often than fruit juice. · Eat 4 to 5 servings of vegetables each day. A serving is 1 cup of lettuce or raw leafy vegetables, ½ cup of chopped or cooked vegetables, or 4 ounces (½ cup) of vegetable juice. Choose vegetables more often than vegetable juice. · Get 2 to 3 servings of low-fat and fat-free dairy each day.  A serving is 8 ounces of milk, 1 cup of yogurt, or 1 ½ ounces of cheese. · Eat 6 to 8 servings of grains each day. A serving is 1 slice of bread, 1 ounce of dry cereal, or ½ cup of cooked rice, pasta, or cooked cereal. Try to choose whole-grain products as much as possible. · Limit lean meat, poultry, and fish to 2 servings each day. A serving is 3 ounces, about the size of a deck of cards. · Eat 4 to 5 servings of nuts, seeds, and legumes (cooked dried beans, lentils, and split peas) each week. A serving is 1/3 cup of nuts, 2 tablespoons of seeds, or ½ cup of cooked beans or peas. · Limit fats and oils to 2 to 3 servings each day. A serving is 1 teaspoon of vegetable oil or 2 tablespoons of salad dressing. · Limit sweets and added sugars to 5 servings or less a week. A serving is 1 tablespoon jelly or jam, ½ cup sorbet, or 1 cup of lemonade. · Eat less than 2,300 milligrams (mg) of sodium a day. If you limit your sodium to 1,500 mg a day, you can lower your blood pressure even more. Tips for success · Start small. Do not try to make dramatic changes to your diet all at once. You might feel that you are missing out on your favorite foods and then be more likely to not follow the plan. Make small changes, and stick with them. Once those changes become habit, add a few more changes. · Try some of the following: ¨ Make it a goal to eat a fruit or vegetable at every meal and at snacks. This will make it easy to get the recommended amount of fruits and vegetables each day. ¨ Try yogurt topped with fruit and nuts for a snack or healthy dessert. ¨ Add lettuce, tomato, cucumber, and onion to sandwiches. ¨ Combine a ready-made pizza crust with low-fat mozzarella cheese and lots of vegetable toppings. Try using tomatoes, squash, spinach, broccoli, carrots, cauliflower, and onions. ¨ Have a variety of cut-up vegetables with a low-fat dip as an appetizer instead of chips and dip. ¨ Sprinkle sunflower seeds or chopped almonds over salads. Or try adding chopped walnuts or almonds to cooked vegetables. ¨ Try some vegetarian meals using beans and peas. Add garbanzo or kidney beans to salads. Make burritos and tacos with mashed villarreal beans or black beans. Where can you learn more? Go to http://magalie-en.info/. Enter Y762 in the search box to learn more about \"DASH Diet: Care Instructions. \" Current as of: 2017 Content Version: 11.7 © 3120-0011 [employer]. Care instructions adapted under license by [employer] (which disclaims liability or warranty for this information). If you have questions about a medical condition or this instruction, always ask your healthcare professional. [address] any warranty or liability for your use of this information. Patient Discharge Instructions Olgarosa Hunter / [id number] : 8/10/1926 Admitted 2018 Discharged: 2018 Take Home Medications · It is important that you take the medication exactly as they are prescribed. · Keep your medication in the bottles provided by the pharmacist and keep a list of the medication names, dosages, and times to be taken in your wallet. · Do not take other medications without consulting your doctor. What to do at [facility] Recommended diet low salt Recommended activity: gradually increase as tolerated If you experience any of the following symptoms: no  Bowel movement, or shortness of breath, chest pain , dizziness, weight gain of 3 pounds or more , please follow up with Dr. Madison Javed at [phone] Follow-up with Dr. Madison Javed at [phone] with an appointment next week . Call for an appointment See Dr. Pavan Field as previously scheduled. Information obtained by : 
I understand that if any problems occur once I am at home I am to contact my physician. I understand and acknowledge receipt of the instructions indicated above. Physician's or R.N.'s Signature                                                                  Date/Time Patient or Representative Signature                                                          Date/Time Introducing [facility]! Robbie Carroll introduces [employer] patient portal. Now you can access parts of your medical record, email your doctor's office, and request medication refills online. 1. In your internet browser, go to [email or web address] 2. Click on the First Time User? Click Here link in the Sign In box. You will see the New Member Sign Up page. 3. Enter your [employer] Access Code exactly as it appears below. You will not need to use this code after youve completed the sign-up process. If you do not sign up before the expiration date, you must request a new code. · [employer] Access Code: [email or web address] Expires: 9/4/2018  4:28 PM 
 
4. Enter the last four digits of your Social Security Number (xxxx) and Date of Birth (mm/dd/yyyy) as indicated and click Submit. You will be taken to the next sign-up page. 5. Create a [employer] ID. This will be your [employer] login ID and cannot be changed, so think of one that is secure and easy to remember. 6. Create a [employer] password. You can change your password at any time. 7. Enter your Password Reset Question and Answer. This can be used at a later time if you forget your password. 8. Enter your e-mail address. You will receive e-mail notification when new information is available in [address] 9. Click Sign Up. You can now view and download portions of your medical record. 10. Click the Download Summary menu link to download a portable copy of your medical information. If you have questions, please visit the Frequently Asked Questions section of the [employer] website. Remember, [employer] is NOT to be used for urgent needs. For medical emergencies, dial 911. Now available from your iPhone and Android! Introducing Shaq Madsen As a [facility] patient, I wanted to make you aware of our electronic visit tool called Shaq Madsen. Ematay Brandon 24/7 allows you to connect within minutes with a medical provider 24 hours a day, seven days a week via a mobile device or tablet or logging into a secure website from your computer. You can access Shaq Madsen from anywhere in the [place]. A virtual visit might be right for you when you have a simple condition and feel like you just dont want to get out of bed, or cant get away from work for an appointment, when your regular [facility] provider is not available (evenings, weekends or holidays), or when youre out of town and need minor care. Electronic visits cost only $49 and if the [facility] 24/7 provider determines a prescription is needed to treat your condition, one can be electronically transmitted to a nearby pharmacy*. Please take a moment to enroll today if you have not already done so. The enrollment process is free and takes just a few minutes. To enroll, please download the Infini[employer]/Freight[employer] donis to your tablet or phone, or visit [email or web address] to enroll on your computer. And, as an [address] [place] [address] patient with a [employer]and[employer] account, the results of your visits will be scanned into your electronic medical record and your primary care provider will be able to view the scanned results. We urge you to continue to see your regular [facility] provider for your ongoing medical care.   And while your primary care provider may not be the one available when you seek a Shaq Chinsunilfin virtual visit, the peace of mind you get from getting a real diagnosis real time can be priceless. For more information on [employer], view our Frequently Asked Questions (FAQs) at [email or web address]. Sincerely, 
 
Светлана Gamino MD 
Chief Medical Officer Ethan Lynn *:  certain medications cannot be prescribed via [employer] Unresulted Labs-Please follow up with your PCP about these lab tests Order Current Status EKG, 12 LEAD, INITIAL Preliminary result Providers Seen During Your Hospitalization Provider Specialty Primary office phone Sudha Bowman MD Emergency Medicine [phone] Matthew Avila MD Internal Medicine [phone] Your Primary Care Physician (PCP) Primary Care Physician Office Phone Office Fax [facility]-[place] , [facility] [phone] You are allergic to the following Allergen Reactions Beta Blocker (Beta-Blockers (Beta-Adrenergic Blocking Agts)) Other (comments) Sick sinus syndrome Amoxicillin Nausea and Vomiting Codeine Nausea Only Donnatal (Phenobarb-Hyoscy-Atropine-Scop) Unknown (comments) Exelon (Rivastigmine) Other (comments) Nausea Other Medication Other (comments) Pneumovax 23 . Arm swelling Plavix (Clopidogrel) Rash Sular (Nisoldipine) Vertigo Recent Documentation Weight Breastfeeding? BMI OB Status Smoking Status 53.1 kg No 19.49 kg/m2 Hysterectomy Never Smoker Emergency Contacts Name Discharge Info Relation Home Work Mobile Salma Juarez DISCHARGE CAREGIVER [3] Other Relative [6] [phone] Flor Cabral DISCHARGE CAREGIVER [3] Child [2] [phone] Hiren Peck YES [1] Child [2] [phone] Patient Belongings The following personal items are in your possession at time of discharge: Dental Appliances: None  Visual Aid: Glasses      Home Medications: None   Jewelry: None  Clothing: None    Other Valuables: None Discharge Instructions Attachments/References HEART FAILURE: AVOIDING TRIGGERS (ENGLISH) Patient Handouts Avoiding Triggers With Heart Failure: Care Instructions Your Care Instructions Triggers are anything that make your heart failure flare up. A flare-up is also called \"sudden heart failure\" or \"acute heart failure. \" When you have a flare-up, fluid builds up in your lungs, and you have problems breathing. You might need to go to the hospital. By watching for changes in your condition and avoiding triggers, you can prevent heart failure flare-ups. Follow-up care is a key part of your treatment and safety. Be sure to make and go to all appointments, and call your doctor if you are having problems. It's also a good idea to know your test results and keep a list of the medicines you take. How can you care for yourself at home? Watch for changes in your weight and condition · Weigh yourself without clothing at the same time each day. Record your weight. Call your doctor if you have sudden weight gain, such as more than 2 to 3 pounds in a day or 5 pounds in a week. (Your doctor may suggest a different range of weight gain.) A sudden weight gain may mean that your heart failure is getting worse. · Keep a daily record of your symptoms. Write down any changes in how you feel, such as new shortness of breath, cough, or problems eating. Also record if your ankles are more swollen than usual and if you feel more tired than usual. Note anything that you ate or did that could have triggered these changes. Limit sodium Sodium causes your body to hold on to extra water. This may cause your heart failure symptoms to get worse. People get most of their sodium from processed foods. Fast food and restaurant meals also tend to be very high in sodium. · Your doctor may suggest that you limit sodium to 2,000 milligrams (mg) a day or less. That is less than 1 teaspoon of salt a day, including all the salt you eat in cooking or in packaged foods. · Read food labels on cans and food packages. They tell you how much sodium you get in one serving. Check the serving size. If you eat more than one serving, you are getting more sodium. · Be aware that sodium can come in forms other than salt, including monosodium glutamate (MSG), sodium citrate, and sodium bicarbonate (baking soda). MSG is often added to Asian food. You can sometimes ask for food without MSG or salt. · Slowly reducing salt will help you adjust to the taste. Take the salt shaker off the table. · Flavor your food with garlic, lemon juice, onion, vinegar, herbs, and spices instead of salt. Do not use soy sauce, steak sauce, onion salt, garlic salt, mustard, or ketchup on your food, unless it is labeled \"low-sodium\" or \"low-salt. \" 
· Make your own salad dressings, sauces, and ketchup without adding salt. · Use fresh or frozen ingredients, instead of canned ones, whenever you can. Choose low-sodium canned goods. · Eat less processed food and food from restaurants, including fast food. Exercise as directed Moderate, regular exercise is very good for your heart. It improves your blood flow and helps control your weight. But too much exercise can stress your heart and cause a heart failure flare-up. · Check with your doctor before you start an exercise program. 
· Walking is an easy way to get exercise. Start out slowly. Gradually increase the length and pace of your walk. Swimming, riding a bike, and using a treadmill are also good forms of exercise. · When you exercise, watch for signs that your heart is working too hard. You are pushing yourself too hard if you cannot talk while you are exercising.  If you become short of breath or dizzy or have chest pain, stop, sit down, and rest. 
 · Do not exercise when you do not feel well. Take medicines correctly · Take your medicines exactly as prescribed. Call your doctor if you think you are having a problem with your medicine. · Make a list of all the medicines you take. Include those prescribed to you by other doctors and any over-the-counter medicines, vitamins, or supplements you take. Take this list with you when you go to any doctor. · Take your medicines at the same time every day. It may help you to post a list of all the medicines you take every day and what time of day you take them. · Make taking your medicine as simple as you can. Plan times to take your medicines when you are doing other things, such as eating a meal or getting ready for bed. This will make it easier to remember to take your medicines. · Get organized. Use helpful tools, such as daily or weekly pill containers. When should you call for help? Call 911 if you have symptoms of sudden heart failure such as: 
  · You have severe trouble breathing.  
  · You cough up pink, foamy mucus.  
  · You have a new irregular or rapid heartbeat.  
 Call your doctor now or seek immediate medical care if: 
  · You have new or increased shortness of breath.  
  · You are dizzy or lightheaded, or you feel like you may faint.  
  · You have sudden weight gain, such as more than 2 to 3 pounds in a day or 5 pounds in a week. (Your doctor may suggest a different range of weight gain.)  
  · You have increased swelling in your legs, ankles, or feet.  
  · You are suddenly so tired or weak that you cannot do your usual activities.  
 Watch closely for changes in your health, and be sure to contact your doctor if you develop new symptoms. Where can you learn more? Go to http://magalie-en.info/. Enter Q931 in the search box to learn more about \"Avoiding Triggers With Heart Failure: Care Instructions. \" Current as of: December 6, 2017 Content Version: 11.7 © 4253-0093 [employer]. Care instructions adapted under license by [employer] (which disclaims liability or warranty for this information). If you have questions about a medical condition or this instruction, always ask your healthcare professional. [address] any warranty or liability for your use of this information. Please provide this summary of care documentation to your next provider. Signatures-by signing, you are acknowledging that this After Visit Summary has been reviewed with you and you have received a copy. Patient Signature:  ____________________________________________________________ Date:  ____________________________________________________________  
  
Jason Farr Provider Signature:  ____________________________________________________________ Date:  ____________________________________________________________

## 2018-08-29 LAB
ANION GAP SERPL CALC-SCNC: 12 MMOL/L (ref 5–15)
BUN SERPL-MCNC: 14 MG/DL (ref 6–20)
BUN/CREAT SERPL: 20 (ref 12–20)
CALCIUM SERPL-MCNC: 8.7 MG/DL (ref 8.5–10.1)
CHLORIDE SERPL-SCNC: 103 MMOL/L (ref 97–108)
CK MB CFR SERPL CALC: 3.4 % (ref 0–2.5)
CK MB SERPL-MCNC: 1.6 NG/ML (ref 5–25)
CK SERPL-CCNC: 47 U/L (ref 26–192)
CO2 SERPL-SCNC: 24 MMOL/L (ref 21–32)
CREAT SERPL-MCNC: 0.69 MG/DL (ref 0.55–1.02)
GLUCOSE SERPL-MCNC: 78 MG/DL (ref 65–100)
POTASSIUM SERPL-SCNC: 3.8 MMOL/L (ref 3.5–5.1)
SODIUM SERPL-SCNC: 139 MMOL/L (ref 136–145)
TROPONIN I SERPL-MCNC: <0.05 NG/ML
TROPONIN I SERPL-MCNC: <0.05 NG/ML

## 2018-08-29 PROCEDURE — 84484 ASSAY OF TROPONIN QUANT: CPT | Performed by: INTERNAL MEDICINE

## 2018-08-29 PROCEDURE — 93005 ELECTROCARDIOGRAM TRACING: CPT

## 2018-08-29 PROCEDURE — 36415 COLL VENOUS BLD VENIPUNCTURE: CPT | Performed by: INTERNAL MEDICINE

## 2018-08-29 PROCEDURE — 80048 BASIC METABOLIC PNL TOTAL CA: CPT | Performed by: INTERNAL MEDICINE

## 2018-08-29 PROCEDURE — 82550 ASSAY OF CK (CPK): CPT | Performed by: INTERNAL MEDICINE

## 2018-08-29 PROCEDURE — 74011250636 HC RX REV CODE- 250/636: Performed by: INTERNAL MEDICINE

## 2018-08-29 PROCEDURE — 74011250637 HC RX REV CODE- 250/637: Performed by: INTERNAL MEDICINE

## 2018-08-29 PROCEDURE — 65660000000 HC RM CCU STEPDOWN

## 2018-08-29 PROCEDURE — 74011000250 HC RX REV CODE- 250: Performed by: INTERNAL MEDICINE

## 2018-08-29 PROCEDURE — 93306 TTE W/DOPPLER COMPLETE: CPT

## 2018-08-29 RX ORDER — FUROSEMIDE 20 MG/1
20 TABLET ORAL DAILY
Status: DISCONTINUED | OUTPATIENT
Start: 2018-08-30 | End: 2018-08-30 | Stop reason: HOSPADM

## 2018-08-29 RX ORDER — NITROGLYCERIN 0.4 MG/1
0.4 TABLET SUBLINGUAL
Status: DISCONTINUED | OUTPATIENT
Start: 2018-08-29 | End: 2018-08-30 | Stop reason: HOSPADM

## 2018-08-29 RX ADMIN — Medication 1 CAPSULE: at 08:24

## 2018-08-29 RX ADMIN — HYDROCODONE BITARTRATE AND ACETAMINOPHEN 1 TABLET: 5; 325 TABLET ORAL at 20:22

## 2018-08-29 RX ADMIN — FUROSEMIDE 20 MG: 10 INJECTION, SOLUTION INTRAMUSCULAR; INTRAVENOUS at 07:08

## 2018-08-29 RX ADMIN — LOVASTATIN 20 MG: 20 TABLET ORAL at 08:24

## 2018-08-29 RX ADMIN — POTASSIUM CHLORIDE 20 MEQ: 750 TABLET, EXTENDED RELEASE ORAL at 08:24

## 2018-08-29 RX ADMIN — Medication 81 MG: at 08:24

## 2018-08-29 RX ADMIN — LISINOPRIL 10 MG: 10 TABLET ORAL at 08:24

## 2018-08-29 RX ADMIN — NITROGLYCERIN 0.4 MG: 0.4 TABLET SUBLINGUAL at 07:27

## 2018-08-29 RX ADMIN — POTASSIUM CHLORIDE 20 MEQ: 750 TABLET, EXTENDED RELEASE ORAL at 17:25

## 2018-08-29 RX ADMIN — FLECAINIDE ACETATE 100 MG: 100 TABLET ORAL at 08:24

## 2018-08-29 RX ADMIN — FLUTICASONE PROPIONATE 2 SPRAY: 50 SPRAY, METERED NASAL at 10:24

## 2018-08-29 RX ADMIN — CARVEDILOL 3.12 MG: 3.12 TABLET, FILM COATED ORAL at 07:08

## 2018-08-29 RX ADMIN — FLECAINIDE ACETATE 100 MG: 100 TABLET ORAL at 17:25

## 2018-08-29 RX ADMIN — PANTOPRAZOLE SODIUM 40 MG: 40 TABLET, DELAYED RELEASE ORAL at 07:08

## 2018-08-29 RX ADMIN — LIDOCAINE HYDROCHLORIDE 40 ML: 20 SOLUTION ORAL; TOPICAL at 08:24

## 2018-08-29 NOTE — PROGRESS NOTES
Problem: Discharge Planning Goal: *Discharge to safe environment Outcome: Progressing Towards Goal 
See CM Notes CRM: Angelica Zhou, MPH; Z: [phone]

## 2018-08-29 NOTE — NURSE NAVIGATOR
Chart reviewed by Heart Failure Nurse Navigator. Heart Failure database completed. EF:  45-50% ACEi/ARB/ARNi: Lisinopril 10mg daily BB: Coreg 3.125mg twice daily Aldosterone Antagonist: not indicated at this time CRT not indicated. NYHA Functional Class III on admission. Heart Failure Teach Back in Patient Education. Heart Failure Avoiding Triggers on Discharge Instructions. Cardiologist:Seen by Dr Fausto Baires. Regular cardiologist Dr. En Francois/BESS Post discharge followup appt with Dr. Edmundo Michael scheduled 9/5 /18 @ 315PM 
 
 
Post discharge follow up phone call to be made within 48-72 hours of discharge. READMISSION 
8/21/18-8/24/18 Final code:  NSTEMI Post discharge followup appt with Dr. Edmundo Michael 8/28/18.

## 2018-08-29 NOTE — PROGRESS NOTES
Reason for Readmission:     \"Chest pain\"; CHF 
      
RRAT Score and Risk Level:     30; HIGH Level of Readmission:    2 - had admission 8/21/18 - 8/24/18 for abdominal pain Care Conference scheduled:   Not at this time Resources/supports as identified by patient/family:   Patient has 2 adult children and a granddaughter that help to transport the patient. Per patient, the granddaughter is in the medical field and checks on her often Top Challenges facing patient (as identified by patient/family and CM): Finances/Medication cost?     None identified; patient uses [employer] [facility] [employer]      Patient doesn't drive anymore but family transports Support system or lack thereof? Extensive family support and involvement Living arrangements? Lives alone in single story home with 3 external steps Self-care/ADLs/Cognition? Independent with ADLs with DME of: cane, shower stool, raised commode seat, and bathroom grab bars Current Advanced Directive/Advance Care Plan:  FULL CODE; on file Plan for utilizing home health:   None - patient declined [address] visit Likelihood of additional readmission:   HIGH; patient had an admission about a week ago Transition of Care Plan:    Based on readmission, the patient's previous Plan of Care 
 has been evaluated and/or modified. The current Transition of Care Plan is:       Patient had an Echo done this morning, 8/29/18. Patient has cardio consult pending. Patient has no hx of SNF or IPR. Patient used [facility] about 2 years ago but declined [address] [facility] visit upon discharge. Care Management Interventions PCP Verified by CM: Yes (Followed by Dr. Kathleen Robles) Last Visit to PCP: 08/28/18 Palliative Care Criteria Met (RRAT>21 & CHF Dx)?: Yes 
Palliative Consult Recommended?: No 
Mode of Transport at Discharge:  Other (see comment) (Daughter or granddaughter to transport patient home) Transition of Care Consult (CM Consult): Discharge Planning MyChart Signup: No 
Discharge Durable Medical Equipment: No (has DME of: cane, shower stool, raised commode seat, and bathroom grab bars) Health Maintenance Reviewed: Yes (CM met with patient with patient alert and oriented x 4) Physical Therapy Consult: No 
Occupational Therapy Consult: No 
Speech Therapy Consult: No 
Current Support Network: Own Home, Lives Alone, Family Lives [place] Confirm Follow Up Transport: Family Plan discussed with Pt/Family/Caregiver: Yes Freedom of Choice Offered: Yes (Patient declined [facility] visit) [employer] Information Provided?: No 
Discharge Location Discharge Placement: Home with family assistance (Lives alone in a single story home with 3 external steps) CRM: Jaden Menard, MPH; Z: [phone]

## 2018-08-29 NOTE — CDMP QUERY
Patient is noted to have a BMI of  19.3. Please clarify if this patient is:  
 
=> Underweight 
=> Cachexia 
=> Other explanation of clinical findings 
=> Clinically Undetermined (no explanation for clinical findings) Presentation:  BMI: 19.3 Ht: 5' 5\" (1.651 m) Wt: 52.6 kg (116 lb) Please clarify and document your clinical opinion in the progress notes and discharge summary, including the definitive and or presumptive diagnosis, (suspected or probable), related to the above clinical findings. Please include clinical findings supporting your diagnosis. Thank you, Lesly Mcpherson RN 
[facility] 
[phone]

## 2018-08-29 NOTE — PROGRESS NOTES
Cardiology Progress Note                                        Admit Date: 8/28/2018 Assessment/Plan:  
 
CHf;acute diastolic; improved; will change to Lasix 20 mg everyday starting am as she lost 6 pounds of weight overnight CAD; minimal 
Takotsubo cardiomyopathy; EF 50% with slight apical hypokinesis SSS; so far tolerating Coreg Remedios Foote is a 80 y.o. female with PROBLEM LIST: 
Patient Active Problem List  
 Diagnosis Date Noted  CHF (congestive heart failure), NYHA class III, acute, combined ([address] 08/28/2018  Acute combined systolic and diastolic CHF, NYHA class 3 ([address] 08/28/2018  Acute non-ST elevation myocardial infarction (NSTEMI) ([address] 08/24/2018  Takotsubo cardiomyopathy 08/23/2018  Acute colitis 01/13/2018  Syncope 04/25/2017  PVCs (premature ventricular contractions) 04/14/2017  Vasovagal near syncope 10/14/2015  Non-sustained ventricular tachycardia ([address] 10/14/2015  UTI (lower urinary tract infection) 10/14/2015  Unsteady gait 10/14/2015  VBI (vertebrobasilar insufficiency) 10/14/2015  Labile essential hypertension 10/14/2015  Functional gait abnormality 10/14/2015  Fever and chills 04/28/2015  Acute respiratory failure with hypoxemia ([address] 04/28/2015 Brock Pentec[employer]al, oral 03/07/2015  Left sided colitis with rectal bleeding ([address] 03/05/2015  Diarrhea 03/04/2015  Colitis 03/04/2015  Volume depletion, gastrointestinal loss 03/04/2015  Abdominal pain, acute, generalized 03/04/2015  Diverticulitis of colon (without mention of hemorrhage)(562.11) 10/22/2013  Ischemic colitis ([address] 10/21/2013  Gastritis 10/21/2013  Peripheral vascular disease ([address] 06/06/2012  Carotid stenosis, bilateral 12/10/2011  GERD (gastroesophageal reflux disease) 12/10/2011  Abdominal pain 12/10/2011  
 HTN (hypertension) 12/08/2011  
 SSS (sick sinus syndrome) ([address] 12/08/2011  PAF (paroxysmal atrial fibrillation) ([address] 12/08/2011  Hypercholesterolemia 12/08/2011  Osteoporosis 12/08/2011  Rhinitis 12/08/2011 Subjective: Chrystine Cagey [place] reports none. Visit Vitals  /66 (BP 1 Location: Left arm, BP Patient Position: At rest)  Pulse (!) 55  Temp 97.8 °F (36.6 °C)  Resp 20  Wt 52.6 kg (116 lb)  SpO2 95%  Breastfeeding No  
 BMI 19.3 kg/m2 Intake/Output Summary (Last 24 hours) at 08/29/18 1648 Last data filed at 08/28/18 2323 Gross per 24 hour Intake                0 ml Output              200 ml Net             -200 ml Objective:  
  
Physical Exam: 
HEENT: Perr[place], EOMI Neck: No JVD,  No thyroidmegaly Resp: CTA bilaterally; No wheezes or rales CV: RRR s1s2 No murmur no s3 Abd:Soft, Nontender Ext: No edema Neuro: Alert and oriented; Nonfocal 
Skin: Warm, Dry, Intact Pulses: 2+ DP/PT/Rad Telemetry: normal sinus rhythm Current Facility-Administered Medications Medication Dose Route Frequency  nitroglycerin (NITROSTAT) tablet 0.4 mg  0.4 mg SubLINGual Q5MIN PRN  
 [START ON 8/30/2018] furosemide (LASIX) tablet 20 mg  20 mg Oral DAILY  loratadine (CLARITIN) tablet 10 mg  10 mg Oral DAILY PRN  
 fluticasone (FLONASE) 50 mcg/actuation nasal spray 2 Spray  2 Spray Both Nostrils DAILY  flecainide (TAMBOCOR) tablet 100 mg  100 mg Oral BID  aspirin delayed-release tablet 81 mg  81 mg Oral DAILY  polyethylene glycol (MIRALAX) packet 17 g  17 g Oral DAILY PRN  
 lisinopril (PRINIVIL, ZESTRIL) tablet 10 mg  10 mg Oral DAILY  lovastatin (MEVACOR) tablet 20 mg  20 mg Oral DAILY  sodium chloride (OCEAN) 0.65 % nasal squeeze bottle 2 Spray  2 Spray Both Nostrils Q2H PRN  
 LORazepam (ATIVAN) tablet 0.5 mg  0.5 mg Oral BID PRN  
 naloxone (NARCAN) injection 0.2 mg  0.2 mg IntraVENous EVERY 2 MINUTES AS NEEDED  pantoprazole (PROTONIX) tablet 40 mg  40 mg Oral ACB  
 HYDROcodone-acetaminophen (NORCO) 5-325 mg per tablet 1 Tab  1 Tab Oral BID PRN  
  lactobac ac& pc-s.therm-b.anim (NIRU Q/RISAQUAD)  1 Cap Oral DAILY  carvedilol (COREG) tablet 3.125 mg  3.125 mg Oral BID WITH MEALS  potassium chloride SR (KLOR-CON 10) tablet 20 mEq  20 mEq Oral BID Data Review:  
Labs:   
Recent Results (from the past 24 hour(s)) TROPONIN I Collection Time: 08/28/18  6:00 PM  
Result Value Ref Range Troponin-I, Qt. <0.05 <0.05 ng/mL METABOLIC PANEL, BASIC Collection Time: 08/29/18  4:05 AM  
Result Value Ref Range Sodium 139 136 - 145 mmol/L Potassium 3.8 3.5 - 5.1 mmol/L Chloride 103 97 - 108 mmol/L  
 CO2 24 21 - 32 mmol/L Anion gap 12 5 - 15 mmol/L Glucose 78 65 - 100 mg/dL BUN 14 6 - 20 MG/DL Creatinine 0.69 0.55 - 1.02 MG/DL  
 BUN/Creatinine ratio 20 12 - 20 GFR est AA >60 >60 ml/min/1.73m2 GFR est non-AA >60 >60 ml/min/1.73m2 Calcium 8.7 8.5 - 10.1 MG/DL  
TROPONIN I Collection Time: 08/29/18  4:05 AM  
Result Value Ref Range Troponin-I, Qt. <0.05 <0.05 ng/mL EKG, 12 LEAD, INITIAL Collection Time: 08/29/18  7:18 AM  
Result Value Ref Range Ventricular Rate 72 BPM  
 Atrial Rate 72 BPM  
 P-R Interval 208 ms QRS Duration 160 ms  
 Q-T Interval 454 ms QTC Calculation (Bezet) 497 ms Calculated P Axis 50 degrees Calculated R Axis -55 degrees Calculated T Axis 95 degrees Diagnosis Sinus rhythm with occasional premature ventricular complexes Left axis deviation Left bundle branch block When compared with ECG of 28-AUG-2018 11:46, No significant change was found TROPONIN I Collection Time: 08/29/18  7:31 AM  
Result Value Ref Range Troponin-I, Qt. <0.05 <0.05 ng/mL CK W/ CKMB & INDEX Collection Time: 08/29/18  7:31 AM  
Result Value Ref Range CK 47 26 - 192 U/L  
 CK - MB 1.6 <3.6 NG/ML  
 CK-MB Index 3.4 (H) 0 - 2.5

## 2018-08-29 NOTE — PROGRESS NOTES
Problem: Falls - Risk of 
Goal: *Absence of Falls Document Patti Getting Fall Risk and appropriate interventions in the flowsheet. Outcome: Progressing Towards Goal 
Fall Risk Interventions: 
Mobility Interventions: Communicate number of staff needed for ambulation/transfer Medication Interventions: Patient to call before getting OOB Elimination Interventions: Bed/chair exit alarm Problem: Pressure Injury - Risk of 
Goal: *Prevention of pressure injury Document Rogerio Scale and appropriate interventions in the flowsheet. Outcome: Progressing Towards Goal 
Pressure Injury Interventions: 
Sensory Interventions: Assess changes in LOC, Float heels, Maintain/enhance activity level, Keep linens dry and wrinkle-free, Minimize linen layers, Monitor skin under medical devices, Pad between skin to skin Moisture Interventions: Absorbent underpads, Apply protective barrier, creams and emollients, Limit adult briefs, Maintain skin hydration (lotion/cream), Minimize layers, Moisture barrier, Offer toileting Q_hr Activity Interventions: Increase time out of bed Mobility Interventions: HOB 30 degrees or less Nutrition Interventions: Offer support with meals,snacks and hydration

## 2018-08-29 NOTE — PROGRESS NOTES
Medical Progress Note NAME: Joey Sanchez :  8/10/1926 MRM:  [id number] Date/Time: 2018 Problem List:  
 
Active Problems: 
  HTN (hypertension) (2011) CHF (congestive heart failure), NYHA class III, acute, combined ([facility] (2018) Acute combined systolic and diastolic CHF, NYHA class 3 ([facility] (2018) Subjective:  
 
Upon waking this feels localized chest pressure and feels like she needs to burp. Breathing fine w/o diaphoresis, dizziness, nausea. Flatus but no bm . Took Miralax last pm  
 
Past Medical History:  
Diagnosis Date  [facility] ischemic [facility]  Acute non-ST elevation myocardial infarction (NSTEMI) ([facility] 2018  Arrhythmia Paroxysmal  Afib,  SSS  Carotid stenosis, bilateral   
 moderate , right >left  GERD (gastroesophageal reflux disease) 12/10/2011  
 HTN (hypertension) 2011  HX OTHER MEDICAL   
 cardiac cath ;non obstructive CAD. Renal artery stents; open [address]  LLQ abd. pain thought due to spasm.  Hypercholesterolemia  Hypertension  Osteoporosis  Osteoporosis 2011  PAF (paroxysmal atrial fibrillation) ([facility] 2011  PVD (peripheral vascular disease) ([facility]  Rhinitis 2011  
 SSS (sick sinus syndrome) ([facility] 2011  Takotsubo cardiomyopathy 2018 Objective:  
 
 
 
Vitals:  
  
Last 24hrs VS reviewed since prior progress note. Most recent are: 
 
Visit Vitals  /68 (BP 1 Location: Left arm, BP Patient Position: At rest)  Pulse (!) 57  Temp 98.3 °F (36.8 °C)  Resp 18  Wt 116 lb (52.6 kg)  SpO2 95%  Breastfeeding No  
 BMI 19.3 kg/m2 SpO2 Readings from Last 6 Encounters:  
18 95% 18 96% 18 94% 18 98% 01/15/18 98% 17 98% Intake/Output Summary (Last 24 hours) at 18 0909 Last data filed at 18 2323 Gross per 24 hour Intake                0 ml Output              200 ml Net             -200 ml Exam:  
   General:  Alert, cooperative, no distress, appears stated age. ENT: JVD: flat at 45 degrees Lungs:   Clear to auscultation bilaterally. Heart:  Regular rate and rhythm, S1, S2 normal, 2/6 systolic murmur heard in carotids , no  click, rub or gallop. Abdomen:   Soft, non-tender. Bowel sounds normal. No masses,  No organomegaly. Extremities: No pedal  Edema. Stat EKG: NSR. First degree AV Block. LBBB again seen. Occasional PVC. Lab Data Reviewed: (see below) Recent Results (from the past 24 hour(s)) EKG, 12 LEAD, INITIAL Collection Time: 08/28/18 11:46 AM  
Result Value Ref Range Ventricular Rate 75 BPM  
 Atrial Rate 75 BPM  
 P-R Interval 220 ms QRS Duration 160 ms  
 Q-T Interval 444 ms QTC Calculation (Bezet) 495 ms Calculated P Axis 55 degrees Calculated R Axis -54 degrees Calculated T Axis 94 degrees Diagnosis Sinus rhythm with 1st degree AV block with occasional premature ventricular  
complexes Left axis deviation Left bundle branch block When compared with ECG of 21-AUG-2018 13:21, 
SC interval has increased Left bundle branch block is now present Criteria for Anteroseptal infarct are no longer present Confirmed by Debora Brody MD, Indira Obando ([id number]) on 8/28/2018 6:14:08 PM 
  
CBC WITH AUTOMATED DIFF Collection Time: 08/28/18 12:52 PM  
Result Value Ref Range WBC 8.4 3.6 - 11.0 K/uL  
 RBC 4.21 3.80 - 5.20 M/uL  
 HGB 12.8 11.5 - 16.0 g/dL HCT 39.4 35.0 - 47.0 % MCV 93.6 80.0 - 99.0 FL  
 MCH 30.4 26.0 - 34.0 PG  
 MCHC 32.5 30.0 - 36.5 g/dL  
 RDW 13.2 11.5 - 14.5 % PLATELET 903 904 - 829 K/uL MPV 11.0 8.9 - 12.9 FL  
 NRBC 0.0 0  WBC ABSOLUTE NRBC 0.00 0.00 - 0.01 K/uL NEUTROPHILS 72 32 - 75 % LYMPHOCYTES 18 12 - 49 % MONOCYTES 9 5 - 13 % EOSINOPHILS 1 0 - 7 % BASOPHILS 1 0 - 1 % IMMATURE GRANULOCYTES 0 0.0 - 0.5 % ABS. NEUTROPHILS 6.0 1.8 - 8.0 K/UL  
 ABS. LYMPHOCYTES 1.5 0.8 - 3.5 K/UL  
 ABS. MONOCYTES 0.7 0.0 - 1.0 K/UL  
 ABS. EOSINOPHILS 0.1 0.0 - 0.4 K/UL  
 ABS. BASOPHILS 0.1 0.0 - 0.1 K/UL  
 ABS. IMM. GRANS. 0.0 0.00 - 0.04 K/UL  
 DF AUTOMATED METABOLIC PANEL, BASIC Collection Time: 08/28/18 12:52 PM  
Result Value Ref Range Sodium 139 136 - 145 mmol/L Potassium 3.8 3.5 - 5.1 mmol/L Chloride 106 97 - 108 mmol/L  
 CO2 27 21 - 32 mmol/L Anion gap 6 5 - 15 mmol/L Glucose 85 65 - 100 mg/dL BUN 11 6 - 20 MG/DL Creatinine 0.72 0.55 - 1.02 MG/DL  
 BUN/Creatinine ratio 15 12 - 20 GFR est AA >60 >60 ml/min/1.73m2 GFR est non-AA >60 >60 ml/min/1.73m2 Calcium 8.8 8.5 - 10.1 MG/DL  
TROPONIN I Collection Time: 08/28/18 12:52 PM  
Result Value Ref Range Troponin-I, Qt. <0.05 <0.05 ng/mL SAMPLES BEING HELD Collection Time: 08/28/18 12:52 PM  
Result Value Ref Range SAMPLES BEING HELD 1RED,1BLUE   
 COMMENT Add-on orders for these samples will be processed based on acceptable specimen integrity and analyte stability, which may vary by analyte. D DIMER Collection Time: 08/28/18  3:24 PM  
Result Value Ref Range D-dimer 1.50 (H) 0.00 - 0.65 mg/L FEU  
TROPONIN I Collection Time: 08/28/18  6:00 PM  
Result Value Ref Range Troponin-I, Qt. <0.05 <0.05 ng/mL METABOLIC PANEL, BASIC Collection Time: 08/29/18  4:05 AM  
Result Value Ref Range Sodium 139 136 - 145 mmol/L Potassium 3.8 3.5 - 5.1 mmol/L Chloride 103 97 - 108 mmol/L  
 CO2 24 21 - 32 mmol/L Anion gap 12 5 - 15 mmol/L Glucose 78 65 - 100 mg/dL BUN 14 6 - 20 MG/DL Creatinine 0.69 0.55 - 1.02 MG/DL  
 BUN/Creatinine ratio 20 12 - 20 GFR est AA >60 >60 ml/min/1.73m2 GFR est non-AA >60 >60 ml/min/1.73m2 Calcium 8.7 8.5 - 10.1 MG/DL  
TROPONIN I Collection Time: 08/29/18  4:05 AM  
Result Value Ref Range Troponin-I, Qt. <0.05 <0.05 ng/mL Medications Reviewed: (see below) 
 
______________________________________________________________________ Medications:  
 
Current Facility-Administered Medications Medication Dose Route Frequency  loratadine (CLARITIN) tablet 10 mg  10 mg Oral DAILY PRN  
 fluticasone (FLONASE) 50 mcg/actuation nasal spray 2 Spray  2 Spray Both Nostrils DAILY  flecainide (TAMBOCOR) tablet 100 mg  100 mg Oral BID  aspirin delayed-release tablet 81 mg  81 mg Oral DAILY  polyethylene glycol (MIRALAX) packet 17 g  17 g Oral DAILY PRN  
 lisinopril (PRINIVIL, ZESTRIL) tablet 10 mg  10 mg Oral DAILY  lovastatin (MEVACOR) tablet 20 mg  20 mg Oral DAILY  sodium chloride (OCEAN) 0.65 % nasal squeeze bottle 2 Spray  2 Spray Both Nostrils Q2H PRN  
 LORazepam (ATIVAN) tablet 0.5 mg  0.5 mg Oral BID PRN  
 naloxone (NARCAN) injection 0.2 mg  0.2 mg IntraVENous EVERY 2 MINUTES AS NEEDED  pantoprazole (PROTONIX) tablet 40 mg  40 mg Oral ACB  doxazosin (CARDURA) tablet 0.5 mg  0.5 mg Oral QHS  
 HYDROcodone-acetaminophen (NORCO) 5-325 mg per tablet 1 Tab  1 Tab Oral BID PRN  
 lactobac ac& pc-s.therm-b.anim (NIRU Q/RISAQUAD)  1 Cap Oral DAILY  furosemide (LASIX) injection 20 mg  20 mg IntraVENous Q12H  carvedilol (COREG) tablet 3.125 mg  3.125 mg Oral BID WITH MEALS  potassium chloride SR (KLOR-CON 10) tablet 20 mEq  20 mEq Oral BID Assessment:  
Chest pressure. One  SL NTG given . Felt perhaps a little better. Chest discomfort could still be GI. Plan : f/u SL NTG . Acute Combined Systolic and Diastolic CHF. Improved HTN . Controlled. Will d/c Doxazosin . Patient Active Problem List  
Diagnosis Code  
 HTN (hypertension) I10  
 SSS (sick sinus syndrome) ([facility] I49.5  PAF (paroxysmal atrial fibrillation) ([facility]) I48.0  Hypercholesterolemia E78.00  
 Osteoporosis M81.0  Rhinitis J31.0  Carotid stenosis, bilateral I65.23  
  GERD (gastroesophageal reflux disease) K21.9  Abdominal pain R10.9  Peripheral vascular disease (H[facility]) I73.9  
 Ischemic colitis ([facility] K55.9  Gastritis K29.70  Diverticulitis of colon (without mention of hemorrhage)(562.11) K57.32  
 Diarrhea R19.7  Colitis K52.9  Volume depletion, gastrointestinal loss E86.9  Abdominal pain, acute, generalized R10.84  Left sided colitis with rectal bleeding ([facility] K51.511 Karla Schneiders, oral B37.0  Fever and chills R50.9  Acute respiratory failure with hypoxemia ([facility]) J96.01  
 Vasovagal near syncope R55  Non-sustained ventricular tachycardia ([facility]) I47.2  UTI (lower urinary tract infection) N39.0  Unsteady gait R26.81  
 VBI (vertebrobasilar insufficiency) G45.0  Labile essential hypertension I10  
 Functional gait abnormality R26.89  
 PVCs (premature ventricular contractions) I49.3  Syncope R55  Acute colitis K52.9  Takotsubo cardiomyopathy I51.81  
 Acute non-ST elevation myocardial infarction (NSTEMI) ([facility]) I21.4  CHF (congestive heart failure), NYHA class III, acute, combined ([facility]) I50.41  Acute combined systolic and diastolic CHF, NYHA class 3 ([facility]) I50.41 Plan:  
Check rpt Cardiac enzymes Echo I spoke with Dr. Rohini Crowell and with her grand dtr. By phone  
      
 
  
 
 
  
              
 
 
 
 
 
 
      
___________________________________________________ Attending Physician: Elen Song MD

## 2018-08-30 VITALS
SYSTOLIC BLOOD PRESSURE: 190 MMHG | HEART RATE: 60 BPM | OXYGEN SATURATION: 95 % | RESPIRATION RATE: 16 BRPM | WEIGHT: 117.13 LBS | TEMPERATURE: 97.4 F | BODY MASS INDEX: 19.49 KG/M2 | DIASTOLIC BLOOD PRESSURE: 80 MMHG

## 2018-08-30 PROBLEM — K59.09 OTHER CONSTIPATION: Status: ACTIVE | Noted: 2018-08-30

## 2018-08-30 PROBLEM — E43 UNSPECIFIED SEVERE PROTEIN-CALORIE MALNUTRITION (HCC): Status: ACTIVE | Noted: 2018-08-30

## 2018-08-30 LAB
ANION GAP SERPL CALC-SCNC: 11 MMOL/L (ref 5–15)
ATRIAL RATE: 72 BPM
BUN SERPL-MCNC: 19 MG/DL (ref 6–20)
BUN/CREAT SERPL: 27 (ref 12–20)
CALCIUM SERPL-MCNC: 8.9 MG/DL (ref 8.5–10.1)
CALCULATED P AXIS, ECG09: 50 DEGREES
CALCULATED R AXIS, ECG10: -55 DEGREES
CALCULATED T AXIS, ECG11: 95 DEGREES
CHLORIDE SERPL-SCNC: 103 MMOL/L (ref 97–108)
CO2 SERPL-SCNC: 23 MMOL/L (ref 21–32)
CREAT SERPL-MCNC: 0.7 MG/DL (ref 0.55–1.02)
DIAGNOSIS, 93000: NORMAL
ERYTHROCYTE [DISTWIDTH] IN BLOOD BY AUTOMATED COUNT: 12.9 % (ref 11.5–14.5)
GLUCOSE SERPL-MCNC: 78 MG/DL (ref 65–100)
HCT VFR BLD AUTO: 37.5 % (ref 35–47)
HGB BLD-MCNC: 12.1 G/DL (ref 11.5–16)
MCH RBC QN AUTO: 30.4 PG (ref 26–34)
MCHC RBC AUTO-ENTMCNC: 32.3 G/DL (ref 30–36.5)
MCV RBC AUTO: 94.2 FL (ref 80–99)
NRBC # BLD: 0 K/UL (ref 0–0.01)
NRBC BLD-RTO: 0 PER 100 WBC
P-R INTERVAL, ECG05: 208 MS
PLATELET # BLD AUTO: 292 K/UL (ref 150–400)
PMV BLD AUTO: 11.1 FL (ref 8.9–12.9)
POTASSIUM SERPL-SCNC: 4 MMOL/L (ref 3.5–5.1)
Q-T INTERVAL, ECG07: 454 MS
QRS DURATION, ECG06: 160 MS
QTC CALCULATION (BEZET), ECG08: 497 MS
RBC # BLD AUTO: 3.98 M/UL (ref 3.8–5.2)
SODIUM SERPL-SCNC: 137 MMOL/L (ref 136–145)
VENTRICULAR RATE, ECG03: 72 BPM
WBC # BLD AUTO: 6.4 K/UL (ref 3.6–11)

## 2018-08-30 PROCEDURE — 74011250637 HC RX REV CODE- 250/637: Performed by: INTERNAL MEDICINE

## 2018-08-30 PROCEDURE — 94760 N-INVAS EAR/PLS OXIMETRY 1: CPT

## 2018-08-30 PROCEDURE — 85027 COMPLETE CBC AUTOMATED: CPT | Performed by: INTERNAL MEDICINE

## 2018-08-30 PROCEDURE — 36415 COLL VENOUS BLD VENIPUNCTURE: CPT | Performed by: INTERNAL MEDICINE

## 2018-08-30 PROCEDURE — 80048 BASIC METABOLIC PNL TOTAL CA: CPT | Performed by: INTERNAL MEDICINE

## 2018-08-30 RX ORDER — CARVEDILOL 3.12 MG/1
3.12 TABLET ORAL 2 TIMES DAILY WITH MEALS
Qty: 60 TAB | Refills: 11 | Status: SHIPPED | OUTPATIENT
Start: 2018-08-30 | End: 2019-01-28

## 2018-08-30 RX ORDER — FUROSEMIDE 20 MG/1
TABLET ORAL
Qty: 40 TAB | Refills: 11 | Status: SHIPPED | OUTPATIENT
Start: 2018-08-30 | End: 2019-01-28

## 2018-08-30 RX ORDER — LISINOPRIL 10 MG/1
20 TABLET ORAL DAILY
Status: DISCONTINUED | OUTPATIENT
Start: 2018-08-30 | End: 2018-08-30 | Stop reason: HOSPADM

## 2018-08-30 RX ADMIN — LOVASTATIN 20 MG: 20 TABLET ORAL at 08:05

## 2018-08-30 RX ADMIN — FLECAINIDE ACETATE 100 MG: 100 TABLET ORAL at 08:05

## 2018-08-30 RX ADMIN — FLUTICASONE PROPIONATE 2 SPRAY: 50 SPRAY, METERED NASAL at 09:41

## 2018-08-30 RX ADMIN — Medication 81 MG: at 08:05

## 2018-08-30 RX ADMIN — Medication 1 CAPSULE: at 08:05

## 2018-08-30 RX ADMIN — FUROSEMIDE 20 MG: 20 TABLET ORAL at 08:05

## 2018-08-30 RX ADMIN — PANTOPRAZOLE SODIUM 40 MG: 40 TABLET, DELAYED RELEASE ORAL at 07:03

## 2018-08-30 RX ADMIN — LISINOPRIL 20 MG: 10 TABLET ORAL at 08:05

## 2018-08-30 RX ADMIN — CARVEDILOL 3.12 MG: 3.12 TABLET, FILM COATED ORAL at 07:04

## 2018-08-30 NOTE — PROGRESS NOTES
Patient received discharge instructions. Claims to understand teaching. Denies all pain. IV and telemetry discontinued.

## 2018-08-30 NOTE — PROGRESS NOTES
Cardiology Progress Note                                        Admit Date: 8/28/2018 Assessment/Plan:  
 
CHF;acute diastolic; improved; will DC on Coreg at small dose and Lasix 20 mg everyday plus ACEI; follow up with Dr Parada Cancer for [facility] visit HTN; needs slight more coverage; will increase Lisinopril back to 20 mg 
CAD; minimal and her CP this time not cardiac 
CMY; resolved; consistent with Takotsubo cardiomyopathy Alan Kelly is a 80 y.o. female with PROBLEM LIST: 
Patient Active Problem List  
 Diagnosis Date Noted  CHF (congestive heart failure), NYHA class III, acute, combined ([address] 08/28/2018  Acute combined systolic and diastolic CHF, NYHA class 3 ([address] 08/28/2018  Acute non-ST elevation myocardial infarction (NSTEMI) ([address] 08/24/2018  Takotsubo cardiomyopathy 08/23/2018  Acute colitis 01/13/2018  Syncope 04/25/2017  PVCs (premature ventricular contractions) 04/14/2017  Vasovagal near syncope 10/14/2015  Non-sustained ventricular tachycardia ([address] 10/14/2015  UTI (lower urinary tract infection) 10/14/2015  Unsteady gait 10/14/2015  VBI (vertebrobasilar insufficiency) 10/14/2015  Labile essential hypertension 10/14/2015  Functional gait abnormality 10/14/2015  Fever and chills 04/28/2015  Acute respiratory failure with hypoxemia ([address] 04/28/2015 [place] Hasting, oral 03/07/2015  Left sided colitis with rectal bleeding ([address] 03/05/2015  Diarrhea 03/04/2015  Colitis 03/04/2015  Volume depletion, gastrointestinal loss 03/04/2015  Abdominal pain, acute, generalized 03/04/2015  Diverticulitis of colon (without mention of hemorrhage)(562.11) 10/22/2013  Ischemic colitis ([address] 10/21/2013  Gastritis 10/21/2013  Peripheral vascular disease ([address] 06/06/2012  Carotid stenosis, bilateral 12/10/2011  GERD (gastroesophageal reflux disease) 12/10/2011  Abdominal pain 12/10/2011  
 HTN (hypertension) 12/08/2011  SSS (sick sinus syndrome) ([facility] 12/08/2011  PAF (paroxysmal atrial fibrillation) ([facility] 12/08/2011  Hypercholesterolemia 12/08/2011  Osteoporosis 12/08/2011  Rhinitis 12/08/2011 Subjective: Chrystine Cagey [place] reports none. Visit Vitals  /71 (BP 1 Location: Left arm, BP Patient Position: At rest)  Pulse 63  Temp 97.7 °F (36.5 °C)  Resp 16  Wt 53.1 kg (117 lb 2 oz)  SpO2 94%  Breastfeeding No  
 BMI 19.49 kg/m2 Intake/Output Summary (Last 24 hours) at 08/30/18 3943 Last data filed at 08/30/18 0073 Gross per 24 hour Intake                0 ml Output              300 ml Net             -300 ml Objective:  
  
Physical Exam: 
HEENT: [place], EOMI Neck: No JVD,  No thyroidmegaly Resp: CTA bilaterally; No wheezes or rales CV: RRR s1s2 No newmurmur no s3 Abd:Soft, Nontender Ext: No edema Neuro: Alert and oriented; Nonfocal 
Skin: Warm, Dry, Intact Pulses: 2+ DP/PT/Rad Telemetry: normal sinus rhythm Current Facility-Administered Medications Medication Dose Route Frequency  nitroglycerin (NITROSTAT) tablet 0.4 mg  0.4 mg SubLINGual Q5MIN PRN  
 furosemide (LASIX) tablet 20 mg  20 mg Oral DAILY  loratadine (CLARITIN) tablet 10 mg  10 mg Oral DAILY PRN  
 fluticasone (FLONASE) 50 mcg/actuation nasal spray 2 Spray  2 Spray Both Nostrils DAILY  flecainide (TAMBOCOR) tablet 100 mg  100 mg Oral BID  aspirin delayed-release tablet 81 mg  81 mg Oral DAILY  polyethylene glycol (MIRALAX) packet 17 g  17 g Oral DAILY PRN  
 lisinopril (PRINIVIL, ZESTRIL) tablet 10 mg  10 mg Oral DAILY  lovastatin (MEVACOR) tablet 20 mg  20 mg Oral DAILY  sodium chloride (OCEAN) 0.65 % nasal squeeze bottle 2 Spray  2 Spray Both Nostrils Q2H PRN  
 LORazepam (ATIVAN) tablet 0.5 mg  0.5 mg Oral BID PRN  
 naloxone (NARCAN) injection 0.2 mg  0.2 mg IntraVENous EVERY 2 MINUTES AS NEEDED  pantoprazole (PROTONIX) tablet 40 mg  40 mg Oral ACB  HYDROcodone-acetaminophen (NORCO) 5-325 mg per tablet 1 Tab  1 Tab Oral BID PRN  
 lactobac ac& pc-s.therm-b.anim (NIRU Q/RISAQUAD)  1 Cap Oral DAILY  carvedilol (COREG) tablet 3.125 mg  3.125 mg Oral BID WITH MEALS Data Review:  
Labs:   
Recent Results (from the past 24 hour(s)) METABOLIC PANEL, BASIC Collection Time: 08/30/18  4:19 AM  
Result Value Ref Range Sodium 137 136 - 145 mmol/L Potassium 4.0 3.5 - 5.1 mmol/L Chloride 103 97 - 108 mmol/L  
 CO2 23 21 - 32 mmol/L Anion gap 11 5 - 15 mmol/L Glucose 78 65 - 100 mg/dL BUN 19 6 - 20 MG/DL Creatinine 0.70 0.55 - 1.02 MG/DL  
 BUN/Creatinine ratio 27 (H) 12 - 20 GFR est AA >60 >60 ml/min/1.73m2 GFR est non-AA >60 >60 ml/min/1.73m2 Calcium 8.9 8.5 - 10.1 MG/DL  
CBC W/O DIFF Collection Time: 08/30/18  4:19 AM  
Result Value Ref Range WBC 6.4 3.6 - 11.0 K/uL  
 RBC 3.98 3.80 - 5.20 M/uL  
 HGB 12.1 11.5 - 16.0 g/dL HCT 37.5 35.0 - 47.0 % MCV 94.2 80.0 - 99.0 FL  
 MCH 30.4 26.0 - 34.0 PG  
 MCHC 32.3 30.0 - 36.5 g/dL  
 RDW 12.9 11.5 - 14.5 % PLATELET 773 820 - 728 K/uL MPV 11.1 8.9 - 12.9 FL  
 NRBC 0.0 0  WBC ABSOLUTE NRBC 0.00 0.00 - 0.01 K/uL

## 2018-08-30 NOTE — NURSE NAVIGATOR
Post discharge followup appt scheduled with Dr. Sebastien Cassidy 9/7/18 @ 1030AM.  AVS updated Marycruz Farnsworth RN-CHFN/HFNN

## 2018-08-30 NOTE — PROGRESS NOTES
Problem: Falls - Risk of 
Goal: *Absence of Falls Document Bishop Nicole Fall Risk and appropriate interventions in the flowsheet. Fall Risk Interventions: 
Mobility Interventions: Patient to call before getting OOB Medication Interventions: Teach patient to arise slowly Elimination Interventions: Call light in reach

## 2018-08-30 NOTE — PROGRESS NOTES
Pt seen on rounds and appears stable for d/c. D/c instr reviewed with pt and grandtr. D/c summary dict.

## 2018-08-30 NOTE — DISCHARGE INSTRUCTIONS
DASH Diet: Care Instructions  Your Care Instructions    The DASH diet is an eating plan that can help lower your blood pressure. DASH stands for Dietary Approaches to Stop Hypertension. Hypertension is high blood pressure. The DASH diet focuses on eating foods that are high in calcium, potassium, and magnesium. These nutrients can lower blood pressure. The foods that are highest in these nutrients are fruits, vegetables, low-fat dairy products, nuts, seeds, and legumes. But taking calcium, potassium, and magnesium supplements instead of eating foods that are high in those nutrients does not have the same effect. The DASH diet also includes whole grains, fish, and poultry. The DASH diet is one of several lifestyle changes your doctor may recommend to lower your high blood pressure. Your doctor may also want you to decrease the amount of sodium in your diet. Lowering sodium while following the DASH diet can lower blood pressure even further than just the DASH diet alone. Follow-up care is a key part of your treatment and safety. Be sure to make and go to all appointments, and call your doctor if you are having problems. It's also a good idea to know your test results and keep a list of the medicines you take. How can you care for yourself at home? Following the DASH diet  · Eat 4 to 5 servings of fruit each day. A serving is 1 medium-sized piece of fruit, ½ cup chopped or canned fruit, 1/4 cup dried fruit, or 4 ounces (½ cup) of fruit juice. Choose fruit more often than fruit juice. · Eat 4 to 5 servings of vegetables each day. A serving is 1 cup of lettuce or raw leafy vegetables, ½ cup of chopped or cooked vegetables, or 4 ounces (½ cup) of vegetable juice. Choose vegetables more often than vegetable juice. · Get 2 to 3 servings of low-fat and fat-free dairy each day. A serving is 8 ounces of milk, 1 cup of yogurt, or 1 ½ ounces of cheese. · Eat 6 to 8 servings of grains each day.  A serving is 1 slice of bread, 1 ounce of dry cereal, or ½ cup of cooked rice, pasta, or cooked cereal. Try to choose whole-grain products as much as possible. · Limit lean meat, poultry, and fish to 2 servings each day. A serving is 3 ounces, about the size of a deck of cards. · Eat 4 to 5 servings of nuts, seeds, and legumes (cooked dried beans, lentils, and split peas) each week. A serving is 1/3 cup of nuts, 2 tablespoons of seeds, or ½ cup of cooked beans or peas. · Limit fats and oils to 2 to 3 servings each day. A serving is 1 teaspoon of vegetable oil or 2 tablespoons of salad dressing. · Limit sweets and added sugars to 5 servings or less a week. A serving is 1 tablespoon jelly or jam, ½ cup sorbet, or 1 cup of lemonade. · Eat less than 2,300 milligrams (mg) of sodium a day. If you limit your sodium to 1,500 mg a day, you can lower your blood pressure even more. Tips for success  · Start small. Do not try to make dramatic changes to your diet all at once. You might feel that you are missing out on your favorite foods and then be more likely to not follow the plan. Make small changes, and stick with them. Once those changes become habit, add a few more changes. · Try some of the following:  ¨ Make it a goal to eat a fruit or vegetable at every meal and at snacks. This will make it easy to get the recommended amount of fruits and vegetables each day. ¨ Try yogurt topped with fruit and nuts for a snack or healthy dessert. ¨ Add lettuce, tomato, cucumber, and onion to sandwiches. ¨ Combine a ready-made pizza crust with low-fat mozzarella cheese and lots of vegetable toppings. Try using tomatoes, squash, spinach, broccoli, carrots, cauliflower, and onions. ¨ Have a variety of cut-up vegetables with a low-fat dip as an appetizer instead of chips and dip. ¨ Sprinkle sunflower seeds or chopped almonds over salads. Or try adding chopped walnuts or almonds to cooked vegetables.   ¨ Try some vegetarian meals using beans and peas. Add garbanzo or kidney beans to salads. Make burritos and tacos with mashed villarreal beans or black beans. Where can you learn more? Go to http://magalie-en.info/. Enter T058 in the search box to learn more about \"DASH Diet: Care Instructions. \"  Current as of: 2017  Content Version: 11.7  © 5555-2567 [employer]. Care instructions adapted under license by [employer] (which disclaims liability or warranty for this information). If you have questions about a medical condition or this instruction, always ask your healthcare professional. [place] [address] any warranty or liability for your use of this information. Patient Discharge Instructions    Etta Otto / [id number] : 8/10/1926    Admitted 2018 Discharged: 2018     Take Home Medications            · It is important that you take the medication exactly as they are prescribed. · Keep your medication in the bottles provided by the pharmacist and keep a list of the medication names, dosages, and times to be taken in your wallet. · Do not take other medications without consulting your doctor. What to do at [address] low salt     Recommended activity: gradually increase as tolerated    If you experience any of the following symptoms: no  Bowel movement, or shortness of breath, chest pain , dizziness, weight gain of 3 pounds or more , please follow up with Dr. Jario Swan at [phone]     Follow-up with Dr. Jairo Swan at [phone] with an appointment next week . Call for an appointment   See Dr. Carlene Swartz as previously scheduled. Information obtained by :  I understand that if any problems occur once I am at home I am to contact my physician. I understand and acknowledge receipt of the instructions indicated above. Physician's or R.N.'s Signature                                                                  Date/Time                                                                                                                                              Patient or Representative Signature                                                          Date/Time

## 2018-08-30 NOTE — DISCHARGE SUMMARY
[address]    Sonia Hodge  MR#: [id number]  : 08/10/1926  ACCOUNT #: [de-identified]   ADMIT DATE: 2018  DISCHARGE DATE: 2018    DISCHARGE DIAGNOSES:  Acute combined systolic and diastolic congestive heart failure New York Heart class III on admission, New York Heart classification II on discharge, hypertension, Takotsubo cardiomyopathy, new left bundle branch block, severe protein calorie malnutrition. DISCHARGE INSTRUCTIONS:  Lasix 20 mg p.o. daily or as directed, amlodipine 2.5 mg p.o. daily p.r.n. systolic blood pressure greater than 160, discontinue standing amlodipine 5 mg daily, continue ASA 81 mg daily, Align 4 mg, norco 5/325 one p.o. b.i.d. p.r.n. pain, continue lisinopril 20 mg daily, Claritin 10 mg p.o. daily as needed for rhinitis, lorazepam 0.5 mg p.o. b.i.d. p.r.n. anxiety, lovastatin 20 mg at bedtime, stop metronidazole, use Narcan nasal spray 2 mg as needed for overdose,  Protonix 40 mg p.o. daily, polyethylene glycol 17 grams p.o. daily as needed, Ocean nasal spray saline both nostrils every 2 hours as needed. A low-salt diet. Contact Dr. Cece Leo for constipation, chest pain, shortness of breath, dizziness, weight gain of 3 pounds or more. Otherwise, follow up with Dr. Cece Leo next week in the office call for an appointment. Keep your appointment with Dr. Mandy Clemente. CHIEF COMPLAINT:  A 66-year-old white female who was admitted with diffuse weakness. HISTORY OF PRESENT ILLNESS:  She had NSTEMI one week ago with Takotsubo cardiomyopathy, EF on cath close to 50% with apical akinesis, minimal LAD disease. She did well upon discharge, initially but then ate salty food and gained 3 pounds which she lost by the day of admission. Both yesterday and on the day of admission, had some localized chest discomfort relieved with burping. She complained of diffuse weakness and was referred to the emergency room.   Troponin 1 was negative. EKG showed normal sinus rhythm, new left bundle branch block. She is admitted now for further evaluation and treatment. PAST MEDICAL HISTORY:  NSTEMI, Takotsubo cardiomyopathy as mentioned, ischemic colitis hospitalization, paroxysmal AFib, sick sinus syndrome, carotid stenosis, moderate right greater than left, GERD, hypertension, left lower quadrant abdominal pain due to spasm, hypercholesterolemia, mild to moderate mitral regurgitation on echo 2017,  osteoporosis peripheral vascular disease. PAST SURGICAL HISTORY:  Colonoscopy 2011, diverticulosis, hemorrhoids. OTHER PAST SURGICAL HISTORY:  Cholecystectomy, hysterectomy 1960, left leg arterial stenting, a tonsillectomy. MEDICATIONS:  Align 4 mg daily, Flagyl 500 mg b.i.d.,  Norco 1 p.o. b.i.d. p.r.n.,  doxazosin 0.5 mg at bedtime for hypertension, Protonix 40 mg daily and Norvasc 2.5 mg daily p.r.n. systolic blood pressure greater than 160, lorazepam 0.5 mg b.i.d. p.r.n.,  saline ocean 0.65% nasal spray q. 2 hours p.r.n., Norvasc 5 mg scheduled p.o. daily,  Mevacor 20 mg at bedtime,  lisinopril 20 mg daily, MiraLax 17 grams in water daily as needed for constipation, flecainide 100 mg p.o. b.i.d., Flonase 2 sprays each nostril daily as needed for rhinitis,  loratadine 10 mg p.o. daily p.r.n. nasal congestion, Narcan 2 mg nasal spray p.r.n. overdose. ALLERGIES:  BETA BLOCKERS HAVE NOT BEEN USED DUE TO SICK SINUS SYNDROME. OTHER ALLERGIES:  AMOXICILLIN, CODEINE, DONNATAL, EXELON NAUSEA, PNEUMOVAX 23 ARM SWELLING, PLAVIX RASH, SULAR VERTIGO. FAMILY HISTORY:  Noncontributory. SOCIAL HISTORY:  Negative smoking, negative ETOH abuse. REVIEW OF SYSTEMS:  Constipated with recent chest discomfort. Denies associated dizziness, dyspnea, diaphoresis. Denies urinary difficulties. She has had a 9-10 month history of left ankle edema. PHYSICAL EXAMINATION:    VITAL SIGNS: 97.1, 70, 174/67, 18, 96% on room air sat.  Weight 124 pounds. HEENT:  Negative. LUNGS:  Clear. HEART:  Regular, I to II/VI systolic murmur, not heard in the carotids. ABDOMEN:  Negative. EXTREMITIES:  Left greater than right trace pretibial edema, 2+ PT pulses bilaterally. NEUROLOGIC:  Nonfocal.    EKG:  NSR, first degree  AV block, new left bundle branch block. Potassium 3.8. Troponin 1 less than 0.05. WBC 8.4, hemoglobin 12.8, platelets 640, normal differential.  D-dimer 1.5. Chest x-ray:  Small bilateral pleural effusions. HOSPITAL COURSE:  The patient was admitted and  ruled out for MI by serial cardiac enzymes. She was on regular admission telemetry. She had a nonspecific chest discomfort on 08/29/2018. EKG was unchanged and cardiac enzymes were negative. Echocardiogram revealed EF 50% with a moderate hypokinesis of the apical wall, mild mitral regurgitation, mild aortic stenosis, bilateral atrial enlargement, mild pulmonic regurgitation, mild to moderate tricuspid regurgitation. Patient was diuresed with Lasix IV and changed to p.o. She felt improved. MiraLax was given for constipation. Her blood pressure medications were adjusted. She was started on Coreg 3.125 mg p.o. b.i.d., which she tolerated well. She continued with a left bundle branch block. She was discharged home in improved condition to have follow up as above. Dr. Marielle Nagy cardiologist consulted in her care. PO intake was encouraged.        MD JUDE Fitch/ALEKSEY  D: 08/30/2018 08:13     T: 08/30/2018 14:59  JOB #: [id number]  CC: Jose C Luis MD  CC: Ruperto Houston MD

## 2018-08-31 ENCOUNTER — PATIENT OUTREACH (OUTPATIENT)
Dept: FAMILY MEDICINE CLINIC | Age: 83
End: 2018-08-31

## 2018-08-31 NOTE — PROGRESS NOTES
Hospital Discharge Follow-Up Date/Time:  2018 9:58 AM 
 
Patient was admitted to [address] [place] [address] on 18 and discharged on 18 for CHF. The physician discharge summary was available at the time of outreach. Patient was contacted within 1 business days of discharge. Top Challenges reviewed with the provider  
-JUAN one week prior to admission-ate salty food and gained 3 lbs, some chest discomfort last 2 days, R/O for MI-EF 50% 
-Started on Coreg- Lasix-at discharge 
-Cardiology f/u scheduled for 18 
- on 18-K=3.1 -No potassium ordered at discharge Method of communication with provider :Phone, staff message Inpatient RRAT score: 30 Was this a readmission? yes Patient stated reason for the readmission: chest pain, ate salty foods Nurse Navigator (NN) contacted the patient by telephone to perform post hospital discharge assessment. Verified name and  with patient as identifiers. Provided introduction to self, and explanation of the Nurse Navigator role. Reviewed discharge instructions and red flags with caregiver (daughter, Segundo Fernandez)  who verbalized understanding. Caregiver given an opportunity to ask questions and does not have any further questions or concerns at this time. The caregiver agrees to contact the PCP office for questions related to their healthcare. NN provided contact information for future reference. Disease Specific:   CHF Liset Olivarez Heart Failure Note Do you have a Scale:    yes How often do you weigh:  daily Daily Weight (document daily weights in flowsheets):   Stable at 117 lbs Amount:  117 lbs Provider Notified:   yes Zone:(Pt Reported)  yellow EF: 50%(result) on 18 Type of HF:   NYHF 111 Cardiac Device present: none Heart Failure Medications: ACE/ARB, dieretics, anticoagulatants Summary of patient's top problems: 
1. CHF Home Health orders at discharge: Patient declined [facility] 
 [address]: n/a Date of initial visit: n/a Durable Medical Equipment ordered/company: none Durable Medical Equipment received: none Barriers to care? Denies, daughter will work with patient r/t Na restriction and weigh management Advance Care Planning:  
Does patient have an Advance Directive:  reviewed and current Medication(s):  
New Medications at Discharge: lasix, Coreg Changed Medications at Discharge: Norvasc Discontinued Medications at Discharge: Doxazosin, Flagyl Medication reconciliation was performed with caregiver, who verbalizes understanding of administration of home medications. There were no barriers to obtaining medications identified at this time. Referral to Pharm D needed: no  
 
Current Outpatient Prescriptions Medication Sig  carvedilol (COREG) 3.125 mg tablet Take 1 Tab by mouth two (2) times daily (with meals).  furosemide (LASIX) 20 mg tablet One pill each morning or as directed  Bifidobacterium Infantis (ALIGN) 4 mg cap Take 1 Cap by mouth daily.  HYDROcodone-acetaminophen (NORCO) 5-325 mg per tablet Take 1 Tab by mouth two (2) times daily as needed for Pain. Max Daily Amount: 2 Tabs. Indications: Pain  pantoprazole (PROTONIX) 40 mg tablet TAKE 1 TABLET DAILY  amLODIPine (NORVASC) 2.5 mg tablet Take 1 Tab by mouth as needed (SBP > 160).  naloxone (NARCAN) 2 mg/actuation spry 1 spray  into 1 nostril. Use a new one for next spray. put  into alternating nostrils. May repeat every 2 to 3 minutes as needed.  LORazepam (ATIVAN) 0.5 mg tablet Take 1 Tab by mouth two (2) times daily as needed for Anxiety. Max Daily Amount: 1 mg.  sodium chloride (OCEAN) 0.65 % nasal spray 2 Sprays by Both Nostrils route every two (2) hours as needed for Congestion.  lovastatin (MEVACOR) 20 mg tablet TAKE 1 TABLET DAILY  lisinopril (PRINIVIL, ZESTRIL) 20 mg tablet TAKE 1 TABLET DAILY  aspirin delayed-release 81 mg tablet Take 81 mg by mouth daily.  polyethylene glycol (MIRALAX) 17 gram/dose powder Take 17 g by mouth daily. As needed  flecainide (TAMBOCOR) 100 mg tablet Take 100 mg by mouth two (2) times a day.  fluticasone (FLONASE) 50 mcg/actuation nasal spray 2 Sprays by Both Nostrils route as needed for Rhinitis.  loratadine (CLARITIN) 10 mg tablet Take 10 mg by mouth daily as needed. No current facility-administered medications for this visit. There are no discontinued medications. [facility] follow up appointment(s):  
Future Appointments Date Time Provider Junito Tiana 9/5/2018 3:15 PM Madison Javed IV,  [facility] follow up appointment(s): Follow up scheduled with PCP on Tuesday, 9/5/18 [facility]:  information provided as a resource Goals  Reduce risk of CHF exacerbations and complications. 08/31/18 · Re-admitted for chest pain on 8/24/18 · Educated to gradually increase activity · Take all medications as ordered · Daily weights, call Dr. Zambrano Laughter if 3 lbs or >  
· NN will follow next week  Understands and adheres to diet. 08/31/18 Gain of 3 lbs since last admission: 
· Educated on [address] · Start small, do not make dramatic chances all at once · Eat a fruit or vegetable at each meal 
· Less than 2,300 mg or sodium daily-around 1500 mg daily to decrease BP 
· NN will follow next week

## 2018-09-06 ENCOUNTER — PATIENT OUTREACH (OUTPATIENT)
Dept: FAMILY MEDICINE CLINIC | Age: 83
End: 2018-09-06

## 2018-09-06 NOTE — PROGRESS NOTES
Nurse Navigator (NN) contacted the patient, daughter by telephone to perform post hospital discharge assessment. Verified name and  with patient as identifiers. Provided introduction to self, and explanation of the Nurse Navigator role. NN reviewed upcoming PCP appointment with  (daughter, Edelmira Girard)  who verbalized understanding. Caregiver given an opportunity to ask questions and does not have any further questions or concerns at this time. The caregiver agrees to contact the PCP office for questions related to their healthcare. NN provided contact information for future reference

## 2018-09-11 ENCOUNTER — PATIENT OUTREACH (OUTPATIENT)
Dept: FAMILY MEDICINE CLINIC | Age: 83
End: 2018-09-11

## 2018-09-11 NOTE — PROGRESS NOTES
Outbound call to patient to complete follow-up assessment, NN was able to talk with daughter Maame Storey) Patient has had follow up with PCp as was scheduled. According to daughter patient has not gained any weight, no cardiac concerns since admission. Daughter given an opportunity to ask questions and does not have any further questions or concerns at this time. The family agrees to contact the PCP office for questions related to their healthcare. NN provided contact information for future reference

## 2018-09-20 ENCOUNTER — PATIENT OUTREACH (OUTPATIENT)
Dept: FAMILY MEDICINE CLINIC | Age: 83
End: 2018-09-20

## 2018-09-20 NOTE — PROGRESS NOTES
Outbound call to patient to complete follow-up assessment, NN was able to talk with daughter Aliyah Caceres) Patient has had follow up with PCP and cardiology as was scheduled. According to daughter patient has not gained any weight, no cardiac concerns since admission. Keeping log of daily weights as was requested. Patient has no new concerns at this time. Plan to go out of town with daughter this weekend. Daughter given an opportunity to ask questions and does not have any further questions or concerns at this time. The family agrees to contact the PCP office for questions related to their healthcare.  
NN provided contact information for future reference

## 2018-09-28 ENCOUNTER — PATIENT OUTREACH (OUTPATIENT)
Dept: FAMILY MEDICINE CLINIC | Age: 83
End: 2018-09-28

## 2018-09-28 NOTE — PROGRESS NOTES
Outbound call to patient to complete ANDREIA weekly assessment. NN was able to speak with daughter, who verified patient by 3 identifiers. Provided introduction to self, and explanation of the Nurse Navigator role.  
  
NN reviewed upcoming PCP appointment with  (daughter, Sharona Rebolledo) and completed medication review. Patient has followed up with PCP as was scheduled. Karthiken Handler an opportunity to ask questions and does not have any further questions or concerns at this time. The caregiver agrees to contact the PCP office for questions related to their healthcare. NN provided contact information for future

## 2018-10-02 ENCOUNTER — PATIENT OUTREACH (OUTPATIENT)
Dept: FAMILY MEDICINE CLINIC | Age: 83
End: 2018-10-02

## 2018-10-02 NOTE — PROGRESS NOTES
. 
Patient has graduated from the Transitions of Care Coordination  program on 10/2/18. Patient's symptoms are stable at this time. Patient/family has the ability to self-manage. Care management goals have been completed at this time. No further nurse navigator follow up scheduled. Goals Addressed Most Recent  COMPLETED: Reduce risk of CHF exacerbations and complications. On track (9/28/2018)  
       
  08/31/18 · Re-admitted for chest pain on 8/24/18 · Educated to gradually increase activity · Take all medications as ordered · Daily weights, call Dr. Veronica Pagan if 3 lbs or >  
· NN will follow next week  COMPLETED: Understands and adheres to diet. On track (9/28/2018)  
       
  08/31/18 Gain of 3 lbs since last admission: 
· Educated on [address] · Start small, do not make dramatic chances all at once · Eat a fruit or vegetable at each meal 
· Less than 2,300 mg or sodium daily-around 1500 mg daily to decrease BP 
· NN will follow next week Pt has nurse navigator's contact information for any further questions, concerns, or needs. Patients upcoming visits:  Future Appointments Date Time Provider Junito Montiel 12/17/2018 3:30  [facility],  [facility]

## 2018-11-12 ENCOUNTER — TELEPHONE (OUTPATIENT)
Dept: CARDIAC REHAB | Age: 83
End: 2018-11-12

## 2019-02-13 ENCOUNTER — APPOINTMENT (OUTPATIENT)
Dept: CT IMAGING | Age: 84
DRG: 392 | End: 2019-02-13
Attending: EMERGENCY MEDICINE
Payer: MEDICARE

## 2019-02-13 ENCOUNTER — HOSPITAL ENCOUNTER (INPATIENT)
Age: 84
LOS: 2 days | Discharge: HOME | DRG: 392 | End: 2019-02-15
Attending: EMERGENCY MEDICINE | Admitting: INTERNAL MEDICINE
Payer: MEDICARE

## 2019-02-13 DIAGNOSIS — K57.92 DIVERTICULITIS: Primary | ICD-10-CM

## 2019-02-13 LAB
ALBUMIN SERPL-MCNC: 4.2 G/DL (ref 3.5–5)
ALBUMIN/GLOB SERPL: 1.1 {RATIO} (ref 1.1–2.2)
ALP SERPL-CCNC: 93 U/L (ref 45–117)
ALT SERPL-CCNC: 20 U/L (ref 12–78)
ANION GAP SERPL CALC-SCNC: 12 MMOL/L (ref 5–15)
AST SERPL-CCNC: 33 U/L (ref 15–37)
ATRIAL RATE: 70 BPM
BASOPHILS # BLD: 0.1 K/UL (ref 0–0.1)
BASOPHILS NFR BLD: 1 % (ref 0–1)
BILIRUB SERPL-MCNC: 0.6 MG/DL (ref 0.2–1)
BUN SERPL-MCNC: 21 MG/DL (ref 6–20)
BUN/CREAT SERPL: 22 (ref 12–20)
CALCIUM SERPL-MCNC: 9.3 MG/DL (ref 8.5–10.1)
CALCULATED P AXIS, ECG09: 75 DEGREES
CALCULATED R AXIS, ECG10: -52 DEGREES
CALCULATED T AXIS, ECG11: 102 DEGREES
CHLORIDE SERPL-SCNC: 102 MMOL/L (ref 97–108)
CO2 SERPL-SCNC: 20 MMOL/L (ref 21–32)
CREAT SERPL-MCNC: 0.95 MG/DL (ref 0.55–1.02)
DIAGNOSIS, 93000: NORMAL
DIFFERENTIAL METHOD BLD: NORMAL
EOSINOPHIL # BLD: 0.2 K/UL (ref 0–0.4)
EOSINOPHIL NFR BLD: 2 % (ref 0–7)
ERYTHROCYTE [DISTWIDTH] IN BLOOD BY AUTOMATED COUNT: 13.2 % (ref 11.5–14.5)
GLOBULIN SER CALC-MCNC: 4 G/DL (ref 2–4)
GLUCOSE SERPL-MCNC: 72 MG/DL (ref 65–100)
HCT VFR BLD AUTO: 42 % (ref 35–47)
HGB BLD-MCNC: 13.3 G/DL (ref 11.5–16)
IMM GRANULOCYTES # BLD AUTO: 0 K/UL (ref 0–0.04)
IMM GRANULOCYTES NFR BLD AUTO: 0 % (ref 0–0.5)
LACTATE SERPL-SCNC: 1.2 MMOL/L (ref 0.4–2)
LIPASE SERPL-CCNC: 104 U/L (ref 73–393)
LYMPHOCYTES # BLD: 2 K/UL (ref 0.8–3.5)
LYMPHOCYTES NFR BLD: 21 % (ref 12–49)
MCH RBC QN AUTO: 29.5 PG (ref 26–34)
MCHC RBC AUTO-ENTMCNC: 31.7 G/DL (ref 30–36.5)
MCV RBC AUTO: 93.1 FL (ref 80–99)
MONOCYTES # BLD: 0.7 K/UL (ref 0–1)
MONOCYTES NFR BLD: 7 % (ref 5–13)
NEUTS SEG # BLD: 6.6 K/UL (ref 1.8–8)
NEUTS SEG NFR BLD: 69 % (ref 32–75)
NRBC # BLD: 0 K/UL (ref 0–0.01)
NRBC BLD-RTO: 0 PER 100 WBC
P-R INTERVAL, ECG05: 252 MS
PLATELET # BLD AUTO: 245 K/UL (ref 150–400)
PMV BLD AUTO: 11 FL (ref 8.9–12.9)
POTASSIUM SERPL-SCNC: 4.1 MMOL/L (ref 3.5–5.1)
PROT SERPL-MCNC: 8.2 G/DL (ref 6.4–8.2)
Q-T INTERVAL, ECG07: 424 MS
QRS DURATION, ECG06: 154 MS
QTC CALCULATION (BEZET), ECG08: 457 MS
RBC # BLD AUTO: 4.51 M/UL (ref 3.8–5.2)
SODIUM SERPL-SCNC: 134 MMOL/L (ref 136–145)
TROPONIN I SERPL-MCNC: <0.05 NG/ML
VENTRICULAR RATE, ECG03: 70 BPM
WBC # BLD AUTO: 9.6 K/UL (ref 3.6–11)

## 2019-02-13 PROCEDURE — 74177 CT ABD & PELVIS W/CONTRAST: CPT

## 2019-02-13 PROCEDURE — 85025 COMPLETE CBC W/AUTO DIFF WBC: CPT

## 2019-02-13 PROCEDURE — 74011250636 HC RX REV CODE- 250/636: Performed by: EMERGENCY MEDICINE

## 2019-02-13 PROCEDURE — 83605 ASSAY OF LACTIC ACID: CPT

## 2019-02-13 PROCEDURE — 83690 ASSAY OF LIPASE: CPT

## 2019-02-13 PROCEDURE — 74011636320 HC RX REV CODE- 636/320: Performed by: RADIOLOGY

## 2019-02-13 PROCEDURE — 36415 COLL VENOUS BLD VENIPUNCTURE: CPT

## 2019-02-13 PROCEDURE — 65660000000 HC RM CCU STEPDOWN

## 2019-02-13 PROCEDURE — 74011250636 HC RX REV CODE- 250/636: Performed by: INTERNAL MEDICINE

## 2019-02-13 PROCEDURE — 74011000258 HC RX REV CODE- 258: Performed by: RADIOLOGY

## 2019-02-13 PROCEDURE — 84484 ASSAY OF TROPONIN QUANT: CPT

## 2019-02-13 PROCEDURE — 96374 THER/PROPH/DIAG INJ IV PUSH: CPT

## 2019-02-13 PROCEDURE — 80053 COMPREHEN METABOLIC PANEL: CPT

## 2019-02-13 PROCEDURE — 93005 ELECTROCARDIOGRAM TRACING: CPT

## 2019-02-13 PROCEDURE — 99284 EMERGENCY DEPT VISIT MOD MDM: CPT

## 2019-02-13 PROCEDURE — 74011250637 HC RX REV CODE- 250/637: Performed by: INTERNAL MEDICINE

## 2019-02-13 PROCEDURE — 96361 HYDRATE IV INFUSION ADD-ON: CPT

## 2019-02-13 PROCEDURE — 87040 BLOOD CULTURE FOR BACTERIA: CPT

## 2019-02-13 RX ORDER — ASPIRIN 81 MG/1
81 TABLET ORAL DAILY
Status: DISCONTINUED | OUTPATIENT
Start: 2019-02-14 | End: 2019-02-15 | Stop reason: HOSPADM

## 2019-02-13 RX ORDER — FLECAINIDE ACETATE 100 MG/1
100 TABLET ORAL 2 TIMES DAILY
Status: DISCONTINUED | OUTPATIENT
Start: 2019-02-13 | End: 2019-02-15 | Stop reason: HOSPADM

## 2019-02-13 RX ORDER — LORATADINE 10 MG/1
10 TABLET ORAL
Status: DISCONTINUED | OUTPATIENT
Start: 2019-02-13 | End: 2019-02-15 | Stop reason: HOSPADM

## 2019-02-13 RX ORDER — FLUTICASONE PROPIONATE 50 MCG
2 SPRAY, SUSPENSION (ML) NASAL
Status: DISCONTINUED | OUTPATIENT
Start: 2019-02-13 | End: 2019-02-15 | Stop reason: HOSPADM

## 2019-02-13 RX ORDER — SODIUM CHLORIDE 9 MG/ML
100 INJECTION, SOLUTION INTRAVENOUS CONTINUOUS
Status: DISCONTINUED | OUTPATIENT
Start: 2019-02-13 | End: 2019-02-15 | Stop reason: HOSPADM

## 2019-02-13 RX ORDER — CIPROFLOXACIN 500 MG/1
500 TABLET ORAL
Status: DISCONTINUED | OUTPATIENT
Start: 2019-02-13 | End: 2019-02-13

## 2019-02-13 RX ORDER — SODIUM CHLORIDE 0.9 % (FLUSH) 0.9 %
10 SYRINGE (ML) INJECTION
Status: COMPLETED | OUTPATIENT
Start: 2019-02-13 | End: 2019-02-13

## 2019-02-13 RX ORDER — METRONIDAZOLE 250 MG/1
500 TABLET ORAL
Status: DISCONTINUED | OUTPATIENT
Start: 2019-02-13 | End: 2019-02-13

## 2019-02-13 RX ORDER — SODIUM CHLORIDE 0.9 % (FLUSH) 0.9 %
5-40 SYRINGE (ML) INJECTION AS NEEDED
Status: DISCONTINUED | OUTPATIENT
Start: 2019-02-13 | End: 2019-02-15 | Stop reason: HOSPADM

## 2019-02-13 RX ORDER — FENTANYL CITRATE 50 UG/ML
50 INJECTION, SOLUTION INTRAMUSCULAR; INTRAVENOUS
Status: COMPLETED | OUTPATIENT
Start: 2019-02-13 | End: 2019-02-13

## 2019-02-13 RX ORDER — LOVASTATIN 20 MG/1
20 TABLET ORAL
Status: DISCONTINUED | OUTPATIENT
Start: 2019-02-13 | End: 2019-02-15 | Stop reason: HOSPADM

## 2019-02-13 RX ORDER — LORAZEPAM 0.5 MG/1
0.5 TABLET ORAL
Status: DISCONTINUED | OUTPATIENT
Start: 2019-02-13 | End: 2019-02-15 | Stop reason: HOSPADM

## 2019-02-13 RX ORDER — DOXAZOSIN 1 MG/1
0.5 TABLET ORAL
COMMUNITY
End: 2020-02-03

## 2019-02-13 RX ORDER — ACETAMINOPHEN 325 MG/1
650 TABLET ORAL
Status: DISCONTINUED | OUTPATIENT
Start: 2019-02-13 | End: 2019-02-15 | Stop reason: HOSPADM

## 2019-02-13 RX ORDER — AMLODIPINE BESYLATE 5 MG/1
5 TABLET ORAL 2 TIMES DAILY
Status: DISCONTINUED | OUTPATIENT
Start: 2019-02-13 | End: 2019-02-15 | Stop reason: HOSPADM

## 2019-02-13 RX ORDER — PREDNISONE 10 MG/1
10 TABLET ORAL DAILY
COMMUNITY
Start: 2019-02-10 | End: 2019-02-15

## 2019-02-13 RX ORDER — SODIUM CHLORIDE 0.9 % (FLUSH) 0.9 %
5-40 SYRINGE (ML) INJECTION EVERY 8 HOURS
Status: DISCONTINUED | OUTPATIENT
Start: 2019-02-13 | End: 2019-02-15 | Stop reason: HOSPADM

## 2019-02-13 RX ORDER — LISINOPRIL 10 MG/1
20 TABLET ORAL DAILY
Status: DISCONTINUED | OUTPATIENT
Start: 2019-02-14 | End: 2019-02-15 | Stop reason: HOSPADM

## 2019-02-13 RX ORDER — HYDROCODONE BITARTRATE AND ACETAMINOPHEN 5; 325 MG/1; MG/1
1 TABLET ORAL
Status: DISCONTINUED | OUTPATIENT
Start: 2019-02-13 | End: 2019-02-15 | Stop reason: HOSPADM

## 2019-02-13 RX ORDER — AMLODIPINE BESYLATE 5 MG/1
2.5 TABLET ORAL AS NEEDED
Status: DISCONTINUED | OUTPATIENT
Start: 2019-02-13 | End: 2019-02-15 | Stop reason: HOSPADM

## 2019-02-13 RX ORDER — PANTOPRAZOLE SODIUM 40 MG/1
40 TABLET, DELAYED RELEASE ORAL DAILY
Status: DISCONTINUED | OUTPATIENT
Start: 2019-02-14 | End: 2019-02-15 | Stop reason: HOSPADM

## 2019-02-13 RX ORDER — METRONIDAZOLE 500 MG/100ML
500 INJECTION, SOLUTION INTRAVENOUS EVERY 12 HOURS
Status: DISCONTINUED | OUTPATIENT
Start: 2019-02-13 | End: 2019-02-15 | Stop reason: HOSPADM

## 2019-02-13 RX ADMIN — Medication 10 ML: at 23:07

## 2019-02-13 RX ADMIN — Medication 10 ML: at 17:17

## 2019-02-13 RX ADMIN — HYDROCODONE BITARTRATE AND ACETAMINOPHEN 1 TABLET: 5; 325 TABLET ORAL at 23:04

## 2019-02-13 RX ADMIN — LOVASTATIN 20 MG: 20 TABLET ORAL at 23:05

## 2019-02-13 RX ADMIN — LORAZEPAM 0.5 MG: 0.5 TABLET ORAL at 23:04

## 2019-02-13 RX ADMIN — METRONIDAZOLE 500 MG: 500 INJECTION, SOLUTION INTRAVENOUS at 23:06

## 2019-02-13 RX ADMIN — SODIUM CHLORIDE 1000 ML: 900 INJECTION, SOLUTION INTRAVENOUS at 15:34

## 2019-02-13 RX ADMIN — AMLODIPINE BESYLATE 5 MG: 5 TABLET ORAL at 23:05

## 2019-02-13 RX ADMIN — FENTANYL CITRATE 50 MCG: 50 INJECTION, SOLUTION INTRAMUSCULAR; INTRAVENOUS at 16:01

## 2019-02-13 RX ADMIN — SODIUM CHLORIDE 100 ML: 900 INJECTION, SOLUTION INTRAVENOUS at 17:17

## 2019-02-13 RX ADMIN — FLECAINIDE ACETATE 100 MG: 100 TABLET ORAL at 23:04

## 2019-02-13 RX ADMIN — SODIUM CHLORIDE 100 ML/HR: 900 INJECTION, SOLUTION INTRAVENOUS at 23:06

## 2019-02-13 RX ADMIN — IOPAMIDOL 100 ML: 755 INJECTION, SOLUTION INTRAVENOUS at 17:17

## 2019-02-13 NOTE — ED PROVIDER NOTES
80 y.o. female with extensive past medical history, please see list, significant for worsening ichemic colitis, HTN, arrhthymia, hypercholesterolemia, osteoporosis, PAF, carotid stenosis, GERD, PVD, NSTEMI, LBBB, and takotsubo cardiomyopathy who presents from PCP office via private vehicle, accompanied by daughter, with chief complaint of abdominal pain. Patient c/o left-sided abdominal pain for past 4 days and chills today. She was seen by Dr. Terrence Ford today who referred to ED for CT scan and further evaluation. She feels constipated. Her last bowel movement was 3 days ago, and had \"small one\" this morning. Last food intake was tomato soup yesterday. Specifically denies fever. There are no other acute medical concerns at this time. Social hx: Never tobacco smoker; Denies EtOH use; Denies illicit drug use PCP: Lenore Hopkins MD 
 
Note written by Bertin Plummer, as dictated by Ellen Velazquez MD 3:10 PM 
 
 
The history is provided by the patient and a relative. No  was used. Past Medical History:  
Diagnosis Date  Acute ischemic colitis ([address]  Acute non-ST elevation myocardial infarction (NSTEMI) ([address] 8/24/2018 8/21/18  Arrhythmia Paroxysmal  Afib,  SSS  Carotid stenosis, bilateral   
 moderate , right >left  GERD (gastroesophageal reflux disease) 12/10/2011  
 HTN (hypertension) 12/8/2011  HX OTHER MEDICAL 2011  
 cardiac cath ;non obstructive CAD. Renal artery stents; open [address] 2011 LLQ abd. pain thought due to spasm.  Hypercholesterolemia  Hypertension  LBBB (left bundle branch block) 08/28/2018  
 with first degree AV Block  Osteoporosis  Osteoporosis 12/8/2011  PAF (paroxysmal atrial fibrillation) ([address] 12/8/2011  PVD (peripheral vascular disease) ([address]  Rhinitis 12/8/2011  
 SSS (sick sinus syndrome) ([address] 12/8/2011  Takotsubo cardiomyopathy 8/23/2018  Unspecified severe protein[facility]calorie malnutrition ([facility] 8/30/2018 Past Surgical History:  
Procedure Laterality Date  ENDOSCOPY, COLON, DIAGNOSTIC  3/2/11  
 diverticulosis,hemorrhoids  HX CHOLECYSTECTOMY  HX HYSTERECTOMY 1960  HX OTHER SURGICAL    
 stent left leg  HX TONSILLECTOMY Family History:  
Problem Relation Age of Onset  Hypertension Mother Social History Socioeconomic History  Marital status:  Spouse name: Not on file  Number of children: Not on file  Years of education: Not on file  Highest education level: Not on file Social Needs  Financial resource strain: Not on file  Food insecurity - worry: Not on file  Food insecurity - inability: Not on file  Transportation needs - medical: Not on file  Transportation needs - non-medical: Not on file Occupational History  Not on file Tobacco Use  Smoking status: Never Smoker  Smokeless tobacco: Never Used Substance and Sexual Activity  Alcohol use: No  
 Drug use: No  
 Sexual activity: Not on file Other Topics Concern  Not on file Social History Narrative  Not on file ALLERGIES: Beta blocker [beta-blockers (beta-adrenergic blocking agts)]; Amoxicillin; Codeine; Donnatal [phenobarb-hyoscy-atropine-scop]; Exelon [rivastigmine]; Other medication; Plavix [clopidogrel]; and Sular [nisoldipine] Review of Systems Constitutional: Positive for chills. Negative for fever. HENT: Negative for trouble swallowing. Respiratory: Negative for shortness of breath. Cardiovascular: Negative for chest pain. Gastrointestinal: Positive for abdominal pain and constipation. Negative for diarrhea, nausea and vomiting. Genitourinary: Negative for difficulty urinating, dysuria and frequency. Musculoskeletal: Negative for back pain. Neurological: Negative for seizures, syncope and weakness. All other systems reviewed and are negative. Vitals:  
 02/13/19 1442 02/13/19 1457 BP:  161/72 Pulse: 77 66 Resp:  18 Temp:  97.9 °F (36.6 °C) SpO2: 99% 99% Physical Exam  
Constitutional: She is oriented to person, place, and time. She appears well-developed and well-nourished. Uncomfortable appearing, AxOx4, speaking in complete sentences HENT:  
Head: Normocephalic and atraumatic. Right Ear: External ear normal.  
Left Ear: External ear normal.  
Nose: Nose normal.  
Eyes: Conjunctivae are normal. Pupils are equal, round, and reactive to light. Right eye exhibits no discharge. Left eye exhibits no discharge. No scleral icterus. Neck: Normal range of motion. Neck supple. No JVD present. No tracheal deviation present. Cardiovascular: Normal rate, regular rhythm and normal heart sounds. Exam reveals no gallop and no friction rub. No murmur heard. Pulmonary/Chest: Effort normal and breath sounds normal. No respiratory distress. She has no wheezes. She has no rales. She exhibits no tenderness. Abdominal: Soft. Bowel sounds are normal. She exhibits distension. There is tenderness. There is no rebound and no guarding. LLQ ttp/ (+) rebound Genitourinary: No vaginal discharge found. Genitourinary Comments: Pt denies urinary/ vaginal complaints Musculoskeletal: Normal range of motion. She exhibits no edema, tenderness or deformity. Neurological: She is alert and oriented to person, place, and time. She displays normal reflexes. No cranial nerve deficit or sensory deficit. She exhibits normal muscle tone. Coordination normal.  
Skin: Skin is warm and dry. Capillary refill takes less than 2 seconds. No rash noted. No erythema. No pallor. Psychiatric: She has a normal mood and affect. Her behavior is normal. Thought content normal.  
Nursing note and vitals reviewed. MDM Procedures Chief Complaint Patient presents with  Abdominal Pain  
 
 
5:08 PM 
 The patients presenting problems have been discussed, and they are in agreement with the care plan formulated and outlined with them. I have encouraged them to ask questions as they arise throughout their visit. MEDICATIONS GIVEN: 
Medications  
sodium chloride 0.9 % bolus infusion 1,000 mL (1,000 mL IntraVENous New Bag 2/13/19 1534)  
sodium chloride 0.9 % bolus infusion 100 mL (not administered) iopamidol (ISOVUE-370) 76 % injection 100 mL (not administered)  
sodium chloride (NS) flush 10 mL (not administered)  
fentaNYL citrate (PF) injection 50 mcg (50 mcg IntraVENous Given 2/13/19 1601) LABS REVIEWED: 
Labs Reviewed METABOLIC PANEL, COMPREHENSIVE - Abnormal; Notable for the following components:  
    Result Value Sodium 134 (*)   
 CO2 20 (*) BUN 21 (*)   
 BUN/Creatinine ratio 22 (*)   
 GFR est non-AA 55 (*) All other components within normal limits CULTURE, BLOOD, PAIRED  
LACTIC ACID  
TROPONIN I  
CBC WITH AUTOMATED DIFF  
LIPASE  
 
 
RADIOLOGY RESULTS: 
The following have been ordered and reviewed: 
_____________________________________________________________________ 
_____________________________________________________________________ EKG interpretation:  
Rhythm: normal sinus rhythm in a LBBB Pattern; and regular . Rate (approx.): 70; Axis: LAD; P wave: normal; QRS interval: normal ; ST/T wave: normal; Negative acute significant segmental elevations/ unchanged compared to study dated 08/29/2018 PROCEDURES: 
 
 
 
CONSULTATIONS:  
 
 
PROGRESS NOTES: 
 
DIAGNOSIS: 
 
1. Diverticulitis ED COURSE: The patients hospital course has been uncomplicated. 5:24 PM 
Pt back from ct/ 'They have to start another IV';  awaiting results;  
 
8:24 PM 
Dr Candice Fernandez at [facility];  
 
9:14 PM 
Patient is being evaluated for admission to the hospital by Dr Candice Fernandez .   The results of their tests and reasons for their admission have been discussed with them and/or available family. They convey agreement and understanding for the need to be admitted and for their admission diagnosis. Consultation has been made with the inpatient physician specialist for hospitalization.

## 2019-02-13 NOTE — ED TRIAGE NOTES
Patient comes to the ER sent by Dr Jim Cardona for abdominal pain since Sunday. Denies N/V/D.  Difficulty with BM

## 2019-02-13 NOTE — ED NOTES
Bedside and Verbal shift change report given to Veronique Rodriguez (oncoming nurse) by Matty Castro RN (offgoing nurse). Report included the following information SBAR, ED Summary and Recent Results.

## 2019-02-14 LAB
ANION GAP SERPL CALC-SCNC: 8 MMOL/L (ref 5–15)
APPEARANCE UR: CLEAR
BACTERIA URNS QL MICRO: NEGATIVE /HPF
BASOPHILS # BLD: 0.1 K/UL (ref 0–0.1)
BASOPHILS NFR BLD: 1 % (ref 0–1)
BILIRUB UR QL: NEGATIVE
BUN SERPL-MCNC: 16 MG/DL (ref 6–20)
BUN/CREAT SERPL: 21 (ref 12–20)
CALCIUM SERPL-MCNC: 8.7 MG/DL (ref 8.5–10.1)
CHLORIDE SERPL-SCNC: 110 MMOL/L (ref 97–108)
CO2 SERPL-SCNC: 23 MMOL/L (ref 21–32)
COLOR UR: ABNORMAL
CREAT SERPL-MCNC: 0.77 MG/DL (ref 0.55–1.02)
DIFFERENTIAL METHOD BLD: NORMAL
EOSINOPHIL # BLD: 0.3 K/UL (ref 0–0.4)
EOSINOPHIL NFR BLD: 4 % (ref 0–7)
EPITH CASTS URNS QL MICRO: ABNORMAL /LPF
ERYTHROCYTE [DISTWIDTH] IN BLOOD BY AUTOMATED COUNT: 12.9 % (ref 11.5–14.5)
GLUCOSE SERPL-MCNC: 62 MG/DL (ref 65–100)
GLUCOSE UR STRIP.AUTO-MCNC: NEGATIVE MG/DL
HCT VFR BLD AUTO: 38.1 % (ref 35–47)
HGB BLD-MCNC: 12.2 G/DL (ref 11.5–16)
HGB UR QL STRIP: NEGATIVE
HYALINE CASTS URNS QL MICRO: ABNORMAL /LPF (ref 0–5)
IMM GRANULOCYTES # BLD AUTO: 0 K/UL (ref 0–0.04)
IMM GRANULOCYTES NFR BLD AUTO: 0 % (ref 0–0.5)
KETONES UR QL STRIP.AUTO: 15 MG/DL
LEUKOCYTE ESTERASE UR QL STRIP.AUTO: ABNORMAL
LYMPHOCYTES # BLD: 2.6 K/UL (ref 0.8–3.5)
LYMPHOCYTES NFR BLD: 31 % (ref 12–49)
MCH RBC QN AUTO: 30.3 PG (ref 26–34)
MCHC RBC AUTO-ENTMCNC: 32 G/DL (ref 30–36.5)
MCV RBC AUTO: 94.8 FL (ref 80–99)
MONOCYTES # BLD: 0.9 K/UL (ref 0–1)
MONOCYTES NFR BLD: 11 % (ref 5–13)
NEUTS SEG # BLD: 4.5 K/UL (ref 1.8–8)
NEUTS SEG NFR BLD: 53 % (ref 32–75)
NITRITE UR QL STRIP.AUTO: NEGATIVE
NRBC # BLD: 0 K/UL (ref 0–0.01)
NRBC BLD-RTO: 0 PER 100 WBC
PH UR STRIP: 6 [PH] (ref 5–8)
PLATELET # BLD AUTO: 235 K/UL (ref 150–400)
PMV BLD AUTO: 10.7 FL (ref 8.9–12.9)
POTASSIUM SERPL-SCNC: 3.7 MMOL/L (ref 3.5–5.1)
PROT UR STRIP-MCNC: NEGATIVE MG/DL
RBC # BLD AUTO: 4.02 M/UL (ref 3.8–5.2)
RBC #/AREA URNS HPF: ABNORMAL /HPF (ref 0–5)
SODIUM SERPL-SCNC: 141 MMOL/L (ref 136–145)
SP GR UR REFRACTOMETRY: 1.02 (ref 1–1.03)
UR CULT HOLD, URHOLD: NORMAL
UROBILINOGEN UR QL STRIP.AUTO: 1 EU/DL (ref 0.2–1)
WBC # BLD AUTO: 8.5 K/UL (ref 3.6–11)
WBC URNS QL MICRO: ABNORMAL /HPF (ref 0–4)

## 2019-02-14 PROCEDURE — 36415 COLL VENOUS BLD VENIPUNCTURE: CPT

## 2019-02-14 PROCEDURE — 81001 URINALYSIS AUTO W/SCOPE: CPT

## 2019-02-14 PROCEDURE — 74011250636 HC RX REV CODE- 250/636: Performed by: INTERNAL MEDICINE

## 2019-02-14 PROCEDURE — 65660000000 HC RM CCU STEPDOWN

## 2019-02-14 PROCEDURE — 74011250637 HC RX REV CODE- 250/637: Performed by: INTERNAL MEDICINE

## 2019-02-14 PROCEDURE — 80048 BASIC METABOLIC PNL TOTAL CA: CPT

## 2019-02-14 PROCEDURE — 85025 COMPLETE CBC W/AUTO DIFF WBC: CPT

## 2019-02-14 PROCEDURE — 97116 GAIT TRAINING THERAPY: CPT

## 2019-02-14 PROCEDURE — 97161 PT EVAL LOW COMPLEX 20 MIN: CPT

## 2019-02-14 RX ORDER — DOXAZOSIN 1 MG/1
0.5 TABLET ORAL
Status: DISCONTINUED | OUTPATIENT
Start: 2019-02-14 | End: 2019-02-15 | Stop reason: HOSPADM

## 2019-02-14 RX ORDER — DOXAZOSIN 1 MG/1
0.5 TABLET ORAL DAILY
Status: DISCONTINUED | OUTPATIENT
Start: 2019-02-14 | End: 2019-02-14

## 2019-02-14 RX ADMIN — LOVASTATIN 20 MG: 20 TABLET ORAL at 21:12

## 2019-02-14 RX ADMIN — SODIUM CHLORIDE 100 ML/HR: 900 INJECTION, SOLUTION INTRAVENOUS at 11:11

## 2019-02-14 RX ADMIN — LISINOPRIL 20 MG: 10 TABLET ORAL at 08:32

## 2019-02-14 RX ADMIN — METRONIDAZOLE 500 MG: 500 INJECTION, SOLUTION INTRAVENOUS at 23:01

## 2019-02-14 RX ADMIN — Medication 10 ML: at 21:15

## 2019-02-14 RX ADMIN — DOXAZOSIN 0.5 MG: 1 TABLET ORAL at 21:10

## 2019-02-14 RX ADMIN — ASPIRIN 81 MG: 81 TABLET ORAL at 08:32

## 2019-02-14 RX ADMIN — HYDROCODONE BITARTRATE AND ACETAMINOPHEN 1 TABLET: 5; 325 TABLET ORAL at 08:47

## 2019-02-14 RX ADMIN — HYDROCODONE BITARTRATE AND ACETAMINOPHEN 1 TABLET: 5; 325 TABLET ORAL at 21:12

## 2019-02-14 RX ADMIN — AMLODIPINE BESYLATE 5 MG: 5 TABLET ORAL at 08:32

## 2019-02-14 RX ADMIN — SODIUM CHLORIDE 100 ML/HR: 900 INJECTION, SOLUTION INTRAVENOUS at 23:01

## 2019-02-14 RX ADMIN — Medication 1 CAPSULE: at 08:32

## 2019-02-14 RX ADMIN — LORAZEPAM 0.5 MG: 0.5 TABLET ORAL at 21:10

## 2019-02-14 RX ADMIN — FLECAINIDE ACETATE 100 MG: 100 TABLET ORAL at 17:56

## 2019-02-14 RX ADMIN — PANTOPRAZOLE SODIUM 40 MG: 40 TABLET, DELAYED RELEASE ORAL at 07:00

## 2019-02-14 RX ADMIN — AMLODIPINE BESYLATE 5 MG: 5 TABLET ORAL at 17:56

## 2019-02-14 RX ADMIN — METRONIDAZOLE 500 MG: 500 INJECTION, SOLUTION INTRAVENOUS at 11:10

## 2019-02-14 RX ADMIN — FLECAINIDE ACETATE 100 MG: 100 TABLET ORAL at 08:31

## 2019-02-14 NOTE — PROGRESS NOTES
Medical Progress Note NAME: César Kent :  8/10/1926 MRM:  [id number] Date/Time: 2019 Problem List:  
Active Problems: 
  Labile essential hypertension (10/14/2015) Diverticulitis of colon (without mention of hemorrhage)(562.11) (10/22/2013) Overview: Ct scan done last night showed sigmoid wall thickening with surrounding  
    inflammation most consistent with diverticulitis. Diverticulitis (2019) Subjective:  
 
Left periscapular more comfortable . Denies abd pain now Had Norco and Ativan last night Past Medical History:  
Diagnosis Date  Acute ischemic [facility]  Acute non-ST elevation myocardial infarction (NSTEMI) ([facility] 2018  Arrhythmia Paroxysmal  Afib,  SSS  Carotid stenosis, bilateral   
 moderate , right >left  GERD (gastroesophageal reflux disease) 12/10/2011  
 HTN (hypertension) 2011  HX OTHER MEDICAL   
 cardiac cath ;non obstructive CAD. Renal artery stents; open [address]  LLQ abd. pain thought due to spasm.  Hypercholesterolemia  Hypertension  LBBB (left bundle branch block) 2018  
 with first degree AV Block  Osteoporosis  Osteoporosis 2011  PAF (paroxysmal atrial fibrillation) ([facility] 2011  PVD (peripheral vascular disease) ([facility]  Rhinitis 2011  
 SSS (sick sinus syndrome) ([facility] 2011  Takotsubo cardiomyopathy 2018  Unspecified severe protein-calorie malnutrition ([facility] 2018 Objective:  
 
 
 
Vitals:  
  
Last 24hrs VS reviewed since prior progress note. Most recent are: 
 
Visit Vitals /58 (BP 1 Location: Right arm, BP Patient Position: At rest) Pulse 62 Temp 98.1 °F (36.7 °C) Resp 16 Wt 119 lb 0.8 oz (54 kg) SpO2 97% Breastfeeding? No  
BMI 19.81 kg/m² SpO2 Readings from Last 6 Encounters:  
19 97% 18 95% 18 96% 18 94% 03/16/18 98% 01/15/18 98% No intake or output data in the 24 hours ending 02/14/19 0757 Exam:  
   General:  Alert, cooperative, no distress, appears stated age. Lungs:   Clear to auscultation bilaterally. Heart:  Regular rate and rhythm, S1, S2 normal, no murmur, click, rub or gallop. Abdomen:   Soft,mild LLQ T w/o rebound-better vs yesterday . Bowel sounds normal. No masses,  No organomegaly. Extremities: No ankle  edema. Lab Data Reviewed: (see below) Recent Results (from the past 24 hour(s)) CULTURE, BLOOD, PAIRED Collection Time: 02/13/19  1:35 PM  
Result Value Ref Range Special Requests: NO SPECIAL REQUESTS Culture result: NO GROWTH AFTER 14 HOURS    
EKG, 12 LEAD, INITIAL Collection Time: 02/13/19  3:19 PM  
Result Value Ref Range Ventricular Rate 70 BPM  
 Atrial Rate 70 BPM  
 P-R Interval 252 ms QRS Duration 154 ms Q-T Interval 424 ms QTC Calculation (Bezet) 457 ms Calculated P Axis 75 degrees Calculated R Axis -52 degrees Calculated T Axis 102 degrees Diagnosis Sinus rhythm with 1st degree AV block premature ventricular complexes Left axis deviation Left bundle branch block When compared with ECG of 29-AUG-2018 07:18, 
UT interval has increased T wave inversion more evident in Lateral leads Confirmed by Olive Morin M.D., [facility] ([id number]) on 2/13/2019 6:24:10 PM 
  
LACTIC ACID Collection Time: 02/13/19  3:20 PM  
Result Value Ref Range Lactic acid 1.2 0.4 - 2.0 MMOL/L  
TROPONIN I Collection Time: 02/13/19  3:20 PM  
Result Value Ref Range Troponin-I, Qt. <0.05 <0.05 ng/mL METABOLIC PANEL, COMPREHENSIVE Collection Time: 02/13/19  3:20 PM  
Result Value Ref Range Sodium 134 (L) 136 - 145 mmol/L Potassium 4.1 3.5 - 5.1 mmol/L Chloride 102 97 - 108 mmol/L  
 CO2 20 (L) 21 - 32 mmol/L Anion gap 12 5 - 15 mmol/L Glucose 72 65 - 100 mg/dL  BUN 21 (H) 6 - 20 MG/DL  
 Creatinine 0.95 0.55 - 1.02 MG/DL  
 BUN/Creatinine ratio 22 (H) 12 - 20 GFR est AA >60 >60 ml/min/1.73m2 GFR est non-AA 55 (L) >60 ml/min/1.73m2 Calcium 9.3 8.5 - 10.1 MG/DL Bilirubin, total 0.6 0.2 - 1.0 MG/DL  
 ALT (SGPT) 20 12 - 78 U/L  
 AST (SGOT) 33 15 - 37 U/L Alk. phosphatase 93 45 - 117 U/L Protein, total 8.2 6.4 - 8.2 g/dL Albumin 4.2 3.5 - 5.0 g/dL Globulin 4.0 2.0 - 4.0 g/dL A-G Ratio 1.1 1.1 - 2.2    
CBC WITH AUTOMATED DIFF Collection Time: 02/13/19  3:20 PM  
Result Value Ref Range WBC 9.6 3.6 - 11.0 K/uL  
 RBC 4.51 3.80 - 5.20 M/uL  
 HGB 13.3 11.5 - 16.0 g/dL HCT 42.0 35.0 - 47.0 % MCV 93.1 80.0 - 99.0 FL  
 MCH 29.5 26.0 - 34.0 PG  
 MCHC 31.7 30.0 - 36.5 g/dL  
 RDW 13.2 11.5 - 14.5 % PLATELET 274 801 - 798 K/uL MPV 11.0 8.9 - 12.9 FL  
 NRBC 0.0 0  WBC ABSOLUTE NRBC 0.00 0.00 - 0.01 K/uL NEUTROPHILS 69 32 - 75 % LYMPHOCYTES 21 12 - 49 % MONOCYTES 7 5 - 13 % EOSINOPHILS 2 0 - 7 % BASOPHILS 1 0 - 1 % IMMATURE GRANULOCYTES 0 0.0 - 0.5 % ABS. NEUTROPHILS 6.6 1.8 - 8.0 K/UL  
 ABS. LYMPHOCYTES 2.0 0.8 - 3.5 K/UL  
 ABS. MONOCYTES 0.7 0.0 - 1.0 K/UL  
 ABS. EOSINOPHILS 0.2 0.0 - 0.4 K/UL  
 ABS. BASOPHILS 0.1 0.0 - 0.1 K/UL  
 ABS. IMM. GRANS. 0.0 0.00 - 0.04 K/UL  
 DF AUTOMATED    
LIPASE Collection Time: 02/13/19  3:20 PM  
Result Value Ref Range Lipase 104 73 - 752 U/L  
METABOLIC PANEL, BASIC Collection Time: 02/14/19  3:54 AM  
Result Value Ref Range Sodium 141 136 - 145 mmol/L Potassium 3.7 3.5 - 5.1 mmol/L Chloride 110 (H) 97 - 108 mmol/L  
 CO2 23 21 - 32 mmol/L Anion gap 8 5 - 15 mmol/L Glucose 62 (L) 65 - 100 mg/dL BUN 16 6 - 20 MG/DL Creatinine 0.77 0.55 - 1.02 MG/DL  
 BUN/Creatinine ratio 21 (H) 12 - 20 GFR est AA >60 >60 ml/min/1.73m2 GFR est non-AA >60 >60 ml/min/1.73m2 Calcium 8.7 8.5 - 10.1 MG/DL  
CBC WITH AUTOMATED DIFF  Collection Time: 02/14/19  3:54 AM  
 Result Value Ref Range WBC 8.5 3.6 - 11.0 K/uL  
 RBC 4.02 3.80 - 5.20 M/uL  
 HGB 12.2 11.5 - 16.0 g/dL HCT 38.1 35.0 - 47.0 % MCV 94.8 80.0 - 99.0 FL  
 MCH 30.3 26.0 - 34.0 PG  
 MCHC 32.0 30.0 - 36.5 g/dL  
 RDW 12.9 11.5 - 14.5 % PLATELET 498 538 - 164 K/uL MPV 10.7 8.9 - 12.9 FL  
 NRBC 0.0 0  WBC ABSOLUTE NRBC 0.00 0.00 - 0.01 K/uL NEUTROPHILS 53 32 - 75 % LYMPHOCYTES 31 12 - 49 % MONOCYTES 11 5 - 13 % EOSINOPHILS 4 0 - 7 % BASOPHILS 1 0 - 1 % IMMATURE GRANULOCYTES 0 0.0 - 0.5 % ABS. NEUTROPHILS 4.5 1.8 - 8.0 K/UL  
 ABS. LYMPHOCYTES 2.6 0.8 - 3.5 K/UL  
 ABS. MONOCYTES 0.9 0.0 - 1.0 K/UL  
 ABS. EOSINOPHILS 0.3 0.0 - 0.4 K/UL  
 ABS. BASOPHILS 0.1 0.0 - 0.1 K/UL  
 ABS. IMM. GRANS. 0.0 0.00 - 0.04 K/UL  
 DF AUTOMATED Medications Reviewed: (see below) 
 
______________________________________________________________________ Medications:  
 
Current Facility-Administered Medications Medication Dose Route Frequency  amLODIPine (NORVASC) tablet 2.5 mg  2.5 mg Oral PRN  
 amLODIPine (NORVASC) tablet 5 mg  5 mg Oral BID  aspirin delayed-release tablet 81 mg  81 mg Oral DAILY  lactobac ac& pc-s.therm-b.anim (NIRU Q/RISAQUAD)  1 Cap Oral DAILY  flecainide (TAMBOCOR) tablet 100 mg  100 mg Oral BID  fluticasone (FLONASE) 50 mcg/actuation nasal spray 2 Spray  2 Spray Both Nostrils DAILY PRN  
 HYDROcodone-acetaminophen (NORCO) 5-325 mg per tablet 1 Tab  1 Tab Oral BID PRN  
 lisinopril (PRINIVIL, ZESTRIL) tablet 20 mg  20 mg Oral DAILY  loratadine (CLARITIN) tablet 10 mg  10 mg Oral DAILY PRN  
 LORazepam (ATIVAN) tablet 0.5 mg  0.5 mg Oral BID PRN  
 lovastatin (MEVACOR) tablet 20 mg  20 mg Oral QHS  pantoprazole (PROTONIX) tablet 40 mg  40 mg Oral DAILY  sodium chloride (OCEAN) 0.65 % nasal squeeze bottle 2 Spray  2 Spray Both Nostrils Q2H PRN  
 sodium chloride (NS) flush 5-40 mL  5-40 mL IntraVENous Q8H  
  sodium chloride (NS) flush 5-40 mL  5-40 mL IntraVENous PRN  
 0.9% sodium chloride infusion  100 mL/hr IntraVENous CONTINUOUS  
 acetaminophen (TYLENOL) tablet 650 mg  650 mg Oral Q4H PRN  
 metroNIDAZOLE (FLAGYL) IVPB premix 500 mg  500 mg IntraVENous Q12H Assessment:  
Diverticulitis improving . P: Clear liq diet. Mobilize with PT Cervical radiculopathy. Better. Continue Tylenol or Norco prn HTN labile. Restart hs Cardura Patient Active Problem List  
Diagnosis Code  
 HTN (hypertension) I10  
 SSS (sick sinus syndrome) ([address] I49.5  PAF (paroxysmal atrial fibrillation) ([facility]) I48.0  Hypercholesterolemia E78.00  
 Osteoporosis M81.0  Rhinitis J31.0  Carotid stenosis, bilateral I65.23  
 GERD (gastroesophageal reflux disease) K21.9  Abdominal pain R10.9  Peripheral vascular disease ([facility]) I73.9  
 Ischemic colitis ([address] K55.9  Gastritis K29.70  Diverticulitis of colon (without mention of hemorrhage)(562.11) K57.32  
 Diarrhea R19.7  Colitis K52.9  Volume depletion, gastrointestinal loss E86.9  Abdominal pain, acute, generalized R10.84  Left sided colitis with rectal bleeding ([address] K51.511 Mellody Subhash, oral B37.0  Fever and chills R50.9  Acute respiratory failure with hypoxemia ([facility]) J96.01  
 Vasovagal near syncope R55  Non-sustained ventricular tachycardia ([facility]) I47.2  UTI (lower urinary tract infection) N39.0  Unsteady gait R26.81  
 VBI (vertebrobasilar insufficiency) G45.0  Labile essential hypertension I10  
 Functional gait abnormality R26.89  
 PVCs (premature ventricular contractions) I49.3  Syncope R55  Acute colitis K52.9  Takotsubo cardiomyopathy I51.81  
 Acute non-ST elevation myocardial infarction (NSTEMI) ([facility]) I21.4  CHF (congestive heart failure), NYHA class III, acute, combined ([facility]) I50.41  Acute combined systolic and diastolic CHF, NYHA class 3 ([facility]) I50.41  
  Other constipation K59.09  
 Unspecified severe protein-calorie malnutrition ([address] E43  Diverticulitis K57.92 Plan:  
 
Spoke w/ grand dtr, here  
      
 
  
 
 
  
              
 
 
 
 
 
 
      
___________________________________________________ Attending Physician: Miles Callejas MD

## 2019-02-14 NOTE — PROGRESS NOTES
Problem: Falls - Risk of 
Goal: *Absence of Falls Document Srinath Dust Fall Risk and appropriate interventions in the flowsheet. Outcome: Progressing Towards Goal 
Fall Risk Interventions: 
Mobility Interventions: Patient to call before getting OOB Medication Interventions: Patient to call before getting OOB Elimination Interventions: Patient to call for help with toileting needs

## 2019-02-14 NOTE — H&P
[address] HISTORY AND PHYSICAL Name:  Kristina Burger 
MR#:  [id number] :  08/10/1926 ACCOUNT #:  [de-identified] ADMIT DATE:  2019 CHIEF COMPLAINT:  A 80-year-old white female admitted with diverticulitis. HISTORY OF PRESENT ILLNESS:  The patient has a history of diverticulitis in the past. 
Three days ago, she developed left lower quadrant abdominal pain associated with 
constipation, was given glycerin suppositories by family member, bowels moved 
somewhat. Her abdominal pain has improved, but not resolved. The patient has also 
had in recent days some left upper back discomfort, this aggravated on exam by neck 
extension, improved with few doses of prednisone from elsewhere. She came to my 
office, had rebound tenderness in the left lower quadrant, was referred to the ER, 
had abdominopelvic CT with contrast revealed minimal sigmoid diverticulitis. I 
reevaluated her in the emergency room. She continues to have left lower quadrant 
pain and has rebound tenderness on exam.  Accordingly, she is admitted now for IV Flagyl, which usually works for her Diverticulitis , for IV fluids, and for bowel rest. 
 
PAST MEDICAL HISTORY/ILLNESS; 
1.  Severe protein calorie malnutrition in the past. 
2.  Takotsubo cardiomyopathy associated with NSTEMI 2018 3. Sick sinus syndrome. 4.  Rhinitis. 5.  Peripheral vascular disease. 6.  Paroxysmal atrial fibrillation. 7.  Osteoporosis. 8.  Left bundle branch block. 9.  Hypertension. 10.  Hypercholesterolemia. 11.  Nonobstructive coronary artery disease in , cath. 12.  Left lower quadrant abdominal pain due to abdominal spasm in . 13.  GERD. 14.  Carotid artery stenosis, moderate, right greater than left. 15.  Acute non-ST segment elevation MI in 2018. 16.  Acute ischemic colitis hospitalization. PAST SURGICAL HISTORY: 
1. Colonoscopy in . 
2.  Diverticulosis. 3.  Cholecystectomy. 4.  Hysterectomy. 5.  Left leg peripheral vascular stenting. 6.  Tonsillectomy. MEDICATIONS: 
1.  Norvasc 2.5 mg daily p.r.n., systolic blood pressure at home greater than 160. 
2.  Norvasc 5 mg b.i.d. 3.  ASA 81 mg daily. 4.  Align 4 mg daily. 5.  Flecainide 100 mg b.i.d. 6.  Flonase two sprays each nostril as needed for rhinitis. 7.  Norco 5/325 mg one b.i.d. p.r.n. neck pain. 8.  Lisinopril 20 mg daily. 9.  Claritin 10 mg daily as needed for rhinitis. 10.  Lorazepam 0.5 mg p.o. b.i.d. p.r.n. anxiety. 11.  Lovastatin 20 mg at bedtime. 12.  Narcan 2 mg nasal spray p.r.n. 13.  Protonix 40 mg p.o. daily. 14.  MiraLax 17 g daily as needed. 15.  Saline nasal spray p.r.n. nasal congestion. ALLERGIES:  BETA-BLOCKING AGENTS ARE AVOIDED BECAUSE OF SICK SINUS SYNDROME; 
AMOXICILLIN, NAUSEA AND VOMITING; CODEINE, NAUSEA; DONNATAL, UNKNOWN; EXELON, NAUSEA; 
PNEUMOVAX 23, ARM SWELLING; PLAVIX, RASH; SULAR, VERTIGO. FAMILY HISTORY:  Noncontributory. SOCIAL HISTORY:  Full code status. Negative smoking. Negative EtOH. Granddaughter 
is a nurse and  Daughter lives locally also and is also supportive REVIEW OF SYSTEMS:  Denies fever or chills. Denies hematochezia, chest pain, dyspnea 
or palpitations. Denies  disease. She is scheduled in about two weeks to have 
peripheral vascular stenting in the lower extremity. PHYSICAL EXAMINATION: 
VITAL SIGNS:  Temperature 97.9, pulse 77, /72, respiratory rate 18, O2 sat 99% 
on room air. GENERAL:  Elderly white female, appearing mildly uncomfortable. HEENT:  Negative. LUNGS:  Clear. HEART:  Regular, 2/6 systolic murmur heard in carotids. ABDOMEN:  BS positive, soft. No masses felt. No HSM. Left lower quadrant mild 
tenderness with rebound. EXTREMITIES: 2+ feet PT pulses bilaterally.  
NEUROLOGIC:  Grossly nonfocal. 
 
LABORATORY DATA:  WBC 9.6, normal differential; hemoglobin 13.3; hematocrit 43.0; 
 platelet count 326,252. Sodium 134, serum CO2 of 20, BUN 21, creatinine 0.95, BUN 
and creatinine ratio 22. LFTs are normal.  Lipase is normal.  Lactic acid level 1.2, 
normal.  Troponin level less than 0.05. Blood cultures pending. Urinalysis pending. EKG:  Sinus rhythm, first degree AV block, PVC, left bundle branch compared with EKG 
on 08/29/2018, MO interval was increased, Q-wave inversion more evident in lateral 
leads. Abdominopelvic CT scan with contrast, minimal acute diverticulitis sigmoid 
suspected. No fluid collection, obstruction or free air. IMPRESSION: 
1. Acute sigmoid diverticulitis. 2.  Peripheral vascular disease. 3.  Coronary artery disease. 4.  Paroxysmal atrial fibrillation, now in normal sinus rhythm. 5.  Labile hypertension. 6.  Lipid disorder. 7.  Probable cervical radiculopathy with left upper back pain reproduced by neck 
extension. PLAN:  Admit, IV fluids, IV Flagyl, bowel rest, analgesia p.r.n. for cervical 
radiculopathy. Followup lab work in the morning including CBC. I spoke with the 
patient's family who is here. Blood pressure medications are ordered. Blue Franco MD 
 
 
[id number] 
D:  02/13/2019 21:10 
T:  02/14/2019 5:53 JOB #:  [id number]

## 2019-02-14 NOTE — PROGRESS NOTES
Problem: Mobility Impaired (Adult and Pediatric) Goal: *Acute Goals and Plan of Care (Insert Text) Physical Therapy Goals Initiated 2/14/2019 1. Patient will move from supine to sit and sit to supine  in bed with modified independence within 7 day(s). 2.  Patient will transfer from bed to chair and chair to bed with modified independence using the least restrictive device within 7 day(s). 3.  Patient will perform sit to stand with modified independence within 7 day(s). 4.  Patient will ambulate with modified independence for 300 feet with the least restrictive device within 7 day(s). 5.  Patient will ascend/descend 4 stairs with 1 handrail(s) with modified independence within 7 day(s). physical Therapy EVALUATION Patient: Anaya Beach (80 y.o. female) Date: 2/14/2019 Primary Diagnosis: Diverticulitis [K57.92] Precautions: Fall ASSESSMENT :  
Based on the objective data described below, the patient presented with Contact guard assistance level overall for transfers. Gait training completed over 300 feet using a gait belt and at the Contact guard assistance level of assistance. Noted multiple small path deviations during gait and she scored a 22/28 on the Tinetti indicating moderate fall risk. Will follow x1-2 additional visits to further assess. Encouraged mobility with family assistance while hospitalized. The following are barriers to independence while in acute care:  
-Cognitive and/or behavioral: none 
-Medical condition: standing balance   
-Other:    
 
The patient will benefit from skilled acute intervention to address the above impairments and their rehabilitation potential is considered to be Excellent Discharge recommendations: Outpatient vs None Patient's barriers to discharging home, in addition to above impairments: lives alone history of falls. Equipment recommendations for successful discharge (if) home: patient owns cane Prior level of function: patient lives alone and is independent for self-care and mobility PLAN : 
Recommendations and Planned Interventions: stairs, bed mobility training, transfer training and gait training Frequency/Duration: Patient will be followed by physical therapy  3 times a week to address goals. SUBJECTIVE:  
Patient stated That happens some times.  (following a path deviation) OBJECTIVE DATA SUMMARY:  
HISTORY:   
Past Medical History:  
Diagnosis Date  Acute ischemic colitis [facility]  Acute non-ST elevation myocardial infarction (NSTEMI) ([facility] 8/24/2018 8/21/18  Arrhythmia Paroxysmal  Afib,  SSS  Carotid stenosis, bilateral   
 moderate , right >left  GERD (gastroesophageal reflux disease) 12/10/2011  
 HTN (hypertension) 12/8/2011  HX OTHER MEDICAL 2011  
 cardiac cath ;non obstructive CAD. Renal artery stents; open [address] 2011 LLQ abd. pain thought due to spasm.  Hypercholesterolemia  Hypertension  LBBB (left bundle branch block) 08/28/2018  
 with first degree AV Block  Osteoporosis  Osteoporosis 12/8/2011  PAF (paroxysmal atrial fibrillation) ([facility] 12/8/2011  PVD (peripheral vascular disease) ([facility]  Rhinitis 12/8/2011  
 SSS (sick sinus syndrome) ([facility] 12/8/2011  Takotsubo cardiomyopathy 8/23/2018  Unspecified severe protein-calorie malnutrition ([facility] 8/30/2018 Past Surgical History:  
Procedure Laterality Date  ENDOSCOPY, COLON, DIAGNOSTIC  3/2/11  
 diverticulosis,hemorrhoids  HX CHOLECYSTECTOMY  HX HYSTERECTOMY 1960  HX OTHER SURGICAL    
 stent left leg  HX TONSILLECTOMY Prior Level of Function/Home Situation: indpendent Personal factors and/or comorbidities impacting plan of care: hx of fall with wrist fx. patient recently cleared after healing from his Home Situation Home Environment: Private residence # Steps to Enter: 3 Rails to Enter:  Yes 
 One/Two Story Residence: One story Living Alone: Yes Support Systems: Family member(s) Patient Expects to be Discharged to[de-identified] Private residence Current DME Used/Available at Home: Cane, straight EXAMINATION/PRESENTATION/DECISION MAKING: Critical Behavior: 
  
  
  
  
Hearing: Auditory Auditory Impairment: NoneSkin:   
Edema:  
Range Of Motion: 
AROM: Generally decreased, functional 
  
  
  
  
  
  
  
Strength:   
Strength: Within functional limits Tone & Sensation:  
Tone: Normal 
  
  
  
  
Sensation: Intact Coordination: 
Coordination: Within functional limits Vision:  
  
Functional Mobility: 
Bed Mobility: 
  
Supine to Sit: Supervision Sit to Supine: Supervision Transfers: 
Sit to Stand: Contact guard assistance Stand to Sit: Contact guard assistance Balance:  
Sitting: Intact Standing: Impaired Standing - Static: Good Standing - Dynamic : FairAmbulation/Gait Training:Distance (ft): 300 Feet (ft)(with 2 brief standing rest breaks) Assistive Device: Gait belt Ambulation - Level of Assistance: Contact guard assistance Gait Description (WDL): Exceptions to [facility] Gait Abnormalities: Decreased step clearance;Trunk sway increased; Path deviations Base of Support: Narrowed Functional Measure: 
Tinetti test: 
 
Sitting Balance: 1 Arises: 2 Attempts to Rise: 2 Immediate Standing Balance: 1 Standing Balance: 1 Nudged: 1 Eyes Closed: 1 Turn 360 Degrees - Continuous/Discontinuous: 0 Turn 360 Degrees - Steady/Unsteady: 1 Sitting Down: 2 Balance Score: 12 Indication of Gait: 1 
R Step Length/Height: 1 L Step Length/Height: 1 
R Foot Clearance: 1 L Foot Clearance: 1 Step Symmetry: 1 Step Continuity: 1 Path: 1 Trunk: 1 Walking Time: 1 Gait Score: 10 Total Score: 22 Tinetti Tool Score Risk of Falls 
<19 = High Fall Risk 19-24 = Moderate Fall Risk 25-28 = Low Fall Risk Alyssa MCKENNA. Performance-Oriented Assessment of Mobility Problems in Elderly Patients. [facility]; [id number]. (Scoring Description: PT Bulletin Feb. 10, 1993) Older adults: Brittanie Casey et al, 2009; n = [address] elderly evaluated with ABC, COMFORT, ADL, and IADL) · Mean COMFORT score for males aged 69-68 years = 26.21(3.40) · Mean COMFORT score for females age 69-68 years = 25.16(4.30) · Mean COMFORT score for males over 80 years = 23.29(6.02) · Mean COMFORT score for females over 80 years = 17.20(8.32) Physical Therapy Evaluation Charge Determination History Examination Presentation Decision-Making MEDIUM  Complexity : 1-2 comorbidities / personal factors will impact the outcome/ POC  LOW Complexity : 1-2 Standardized tests and measures addressing body structure, function, activity limitation and / or participation in recreation  LOW Complexity : Stable, uncomplicated  LOW Complexity : FOTO score of  Based on the above components, the patient evaluation is determined to be of the following complexity level: LOW Activity Tolerance:  
Good Please refer to the flowsheet for vital signs taken during this treatment. After treatment patient left:  
Supine in bed RN notified Family at bedside COMMUNICATION/EDUCATION:  
The patients plan of care was discussed with: Registered Nurse. Fall prevention education was provided and the patient/caregiver indicated understanding., Patient/family have participated as able in goal setting and plan of care. and Patient/family agree to work toward stated goals and plan of care. Thank you for this referral. 
Evan Laboy, PT, DPT Time Calculation: 24 mins

## 2019-02-14 NOTE — PROGRESS NOTES
Clinical Pharmacy Note: Metronidazole Dosing Please note that the metronidazole dose for Elijah Arango has been changed to 500 mg q12h per [facility]-approved protocol. Please contact the pharmacy with any questions.  
 
Kvng Johnston, [facility]

## 2019-02-14 NOTE — PROGRESS NOTES
Admission Medication Reconciliation: 
 
Information obtained from: patient/ patient's daughter/ Rx Query Significant PMH/Disease States:  
Past Medical History:  
Diagnosis Date  Acute ischemic colitis ([facility]  Acute non-ST elevation myocardial infarction (NSTEMI) ([facility] 8/24/2018 8/21/18  Arrhythmia Paroxysmal  Afib,  SSS  Carotid stenosis, bilateral   
 moderate , right >left  GERD (gastroesophageal reflux disease) 12/10/2011  
 HTN (hypertension) 12/8/2011  HX OTHER MEDICAL 2011  
 cardiac cath ;non obstructive CAD. Renal artery stents; open [address] 2011 LLQ abd. pain thought due to spasm.  Hypercholesterolemia  Hypertension  LBBB (left bundle branch block) 08/28/2018  
 with first degree AV Block  Osteoporosis  Osteoporosis 12/8/2011  PAF ([facility]oxysmal [facility] fibrillation) [facility] 12/8/2011  PVD (peripheral vascular disease) ([facility]  Rhinitis 12/8/2011  
 SSS (sick sinus syndrome) ([facility] 12/8/2011  Takotsubo cardiomyopathy 8/23/2018  Unspecified severe protein-calorie malnutrition ([facility] 8/30/2018 Chief Complaint for this Admission:  abdominal pain Allergies:  Beta blocker [beta-blockers (beta-adrenergic blocking agts)]; Amoxicillin; Codeine; Donnatal [phenobarb-hyoscy-atropine-scop]; Exelon [rivastigmine]; Other medication; Plavix [clopidogrel]; and Sular [nisoldipine] Prior to Admission Medications:  
Prior to Admission Medications Prescriptions Last Dose Informant Patient Reported? Taking? Bifidobacterium Infantis (ALIGN) 4 mg cap 2/13/2019 at am  Yes Yes Sig: Take 1 Cap by mouth daily. HYDROcodone-acetaminophen (NORCO) 5-325 mg per tablet 2/12/2019 at pm  No Yes Sig: Take 1 Tab by mouth two (2) times daily as needed for Pain. Max Daily Amount: 2 Tabs. LORazepam (ATIVAN) 0.5 mg tablet 2/12/2019 at pm  No Yes Sig: Take 1 Tab by mouth two (2) times daily as needed for Anxiety. Max Daily Amount: 1 mg. amLODIPine (NORVASC) 2.5 mg tablet   No Yes Sig: Take 1 Tab by mouth as needed (SBP > 160). amLODIPine (NORVASC) 5 mg tablet 2019 at am  No Yes Sig: Take 1 Tab by mouth two (2) times a day. aspirin delayed-release 81 mg tablet 2019 at am  Yes Yes Sig: Take 81 mg by mouth daily. doxazosin (CARDURA) 1 mg tablet 2019 at pm  Yes Yes Sig: Take 0.5 mg by mouth nightly. flecainide (TAMBOCOR) 100 mg tablet 2019 at am  Yes Yes Sig: Take 100 mg by mouth two (2) times a day. fluticasone (FLONASE) 50 mcg/actuation nasal spray 2019 at pm  Yes Yes Si Sprays by Both Nostrils route nightly as needed for Rhinitis. lisinopril (PRINIVIL, ZESTRIL) 20 mg tablet   No Yes Sig: TAKE 1 TABLET DAILY  
loratadine (CLARITIN) 10 mg tablet   Yes Yes Sig: Take 10 mg by mouth daily as needed. lovastatin (MEVACOR) 20 mg tablet 2019 at pm  No Yes Sig: TAKE 1 TABLET DAILY  
naloxone (NARCAN) 2 mg/actuation spry   No Yes Si spray  into 1 nostril. Use a new one for next spray. put  into alternating nostrils. May repeat every 2 to 3 minutes as needed. pantoprazole (PROTONIX) 40 mg tablet   No Yes Sig: TAKE 1 TABLET DAILY polyethylene glycol (MIRALAX) 17 gram/dose powder   Yes Yes Sig: Take 17 g by mouth daily. As needed  
predniSONE (DELTASONE) 10 mg tablet 2/10/2019  Yes Yes Sig: Take 10 mg by mouth daily. Take 10mg PO BID x 10days  
sodium chloride (OCEAN) 0.65 % nasal spray   No Yes Si Sprays by Both Nostrils route every two (2) hours as needed for Congestion. Facility-Administered Medications: None Comments/Recommendations: Reviewed PTA list with patient and her daughter. The following change has been made: 
 
(1) Add:  
- doxazosin - prednisone (2) Remove: 
- repetitive entry for lorazepam 
 
(3) Change: n/a Pt started on prednisone on  (2/10) for her neck/back symptoms but only took two doses on Sunday and stopped taking it due to upset stomach. Pt did not take lisinopril today, as her blood pressure was very low in the morning. Pt reported taking Norco prn for back pain and lorazepam at bedtime. Daughter reported that lorazepam helps with patient's sleep and was concerned if patient can received a dose tonight. Reviewed medications allergies w/ patient and daughter, confirmed the following allergies with reactions: 
- beta blocker: drop heart rate too much and leads to heart block 
- amoxicillin: diaherra - Codeine: throw up - Donnatal: upset stomach - Exelo: throw up - Plavix: rash - Sular: throw up - Advil: throw up Physician has ordered the PTA meds prior to med rec completeion. Will contact physician with regard to the addition of Doxazosin and Prednisone. Daniel Mclean PharmD candidate

## 2019-02-14 NOTE — PROGRESS NOTES
Problem: Falls - Risk of 
Goal: *Absence of Falls Document Kae Solomon Fall Risk and appropriate interventions in the flowsheet. Outcome: Progressing Towards Goal 
Fall Risk Interventions: 
Mobility Interventions: Patient to call before getting OOB Medication Interventions: Patient to call before getting OOB, Teach patient to arise slowly Elimination Interventions: Call light in reach, Patient to call for help with toileting needs, Toileting schedule/hourly rounds

## 2019-02-15 VITALS
DIASTOLIC BLOOD PRESSURE: 72 MMHG | OXYGEN SATURATION: 95 % | TEMPERATURE: 98.6 F | RESPIRATION RATE: 17 BRPM | WEIGHT: 129.63 LBS | HEART RATE: 55 BPM | SYSTOLIC BLOOD PRESSURE: 146 MMHG | BODY MASS INDEX: 21.57 KG/M2

## 2019-02-15 LAB
ANION GAP SERPL CALC-SCNC: 8 MMOL/L (ref 5–15)
BUN SERPL-MCNC: 9 MG/DL (ref 6–20)
BUN/CREAT SERPL: 13 (ref 12–20)
CALCIUM SERPL-MCNC: 8.7 MG/DL (ref 8.5–10.1)
CHLORIDE SERPL-SCNC: 110 MMOL/L (ref 97–108)
CO2 SERPL-SCNC: 22 MMOL/L (ref 21–32)
CREAT SERPL-MCNC: 0.67 MG/DL (ref 0.55–1.02)
ERYTHROCYTE [DISTWIDTH] IN BLOOD BY AUTOMATED COUNT: 12.8 % (ref 11.5–14.5)
GLUCOSE SERPL-MCNC: 71 MG/DL (ref 65–100)
HCT VFR BLD AUTO: 38.6 % (ref 35–47)
HGB BLD-MCNC: 12.6 G/DL (ref 11.5–16)
MCH RBC QN AUTO: 29.8 PG (ref 26–34)
MCHC RBC AUTO-ENTMCNC: 32.6 G/DL (ref 30–36.5)
MCV RBC AUTO: 91.3 FL (ref 80–99)
NRBC # BLD: 0 K/UL (ref 0–0.01)
NRBC BLD-RTO: 0 PER 100 WBC
PLATELET # BLD AUTO: 239 K/UL (ref 150–400)
PMV BLD AUTO: 11 FL (ref 8.9–12.9)
POTASSIUM SERPL-SCNC: 3.5 MMOL/L (ref 3.5–5.1)
RBC # BLD AUTO: 4.23 M/UL (ref 3.8–5.2)
SODIUM SERPL-SCNC: 140 MMOL/L (ref 136–145)
WBC # BLD AUTO: 5.8 K/UL (ref 3.6–11)

## 2019-02-15 PROCEDURE — 74011250637 HC RX REV CODE- 250/637: Performed by: INTERNAL MEDICINE

## 2019-02-15 PROCEDURE — 85027 COMPLETE CBC AUTOMATED: CPT

## 2019-02-15 PROCEDURE — 36415 COLL VENOUS BLD VENIPUNCTURE: CPT

## 2019-02-15 PROCEDURE — 80048 BASIC METABOLIC PNL TOTAL CA: CPT

## 2019-02-15 RX ORDER — METRONIDAZOLE 500 MG/1
500 TABLET ORAL 2 TIMES DAILY
Qty: 14 TAB | Refills: 0 | Status: SHIPPED | OUTPATIENT
Start: 2019-02-15 | End: 2020-02-03

## 2019-02-15 RX ADMIN — PANTOPRAZOLE SODIUM 40 MG: 40 TABLET, DELAYED RELEASE ORAL at 06:00

## 2019-02-15 RX ADMIN — Medication 1 CAPSULE: at 09:17

## 2019-02-15 RX ADMIN — ASPIRIN 81 MG: 81 TABLET ORAL at 09:17

## 2019-02-15 RX ADMIN — LISINOPRIL 20 MG: 10 TABLET ORAL at 09:17

## 2019-02-15 RX ADMIN — Medication 10 ML: at 05:52

## 2019-02-15 RX ADMIN — FLECAINIDE ACETATE 100 MG: 100 TABLET ORAL at 09:17

## 2019-02-15 RX ADMIN — AMLODIPINE BESYLATE 5 MG: 5 TABLET ORAL at 09:17

## 2019-02-15 RX ADMIN — HYDROCODONE BITARTRATE AND ACETAMINOPHEN 1 TABLET: 5; 325 TABLET ORAL at 05:57

## 2019-02-15 NOTE — DISCHARGE SUMMARY
PCP. She feels better   abd exam improved. Appears stable for d/c . Instr. Reviewed w/ pt and gdtr, here.  F/u in office

## 2019-02-15 NOTE — DISCHARGE INSTRUCTIONS
Patient Discharge Instructions    Sterling Jimbo / [id number] : 8/10/1926    Admitted 2019 Discharged: 2/15/2019     Take Home Medications          · It is important that you take the medication exactly as they are prescribed. · Keep your medication in the bottles provided by the pharmacist and keep a list of the medication names, dosages, and times to be taken in your wallet. · Do not take other medications without consulting your doctor. What to do at Home    Recommended diet: soft, low fiber diet     Recommended activity: walk with assistance as needed. Do not take Lorazepam and Hydrocodone within 2 hours of each other. For neck pain, apply heat as needed. If you experience any of the following symptoms : worsened pain in the neck , back, or abdomen, please follow up with Dr. Yu Hernandez with Dr. Armani Sue in 1 week . Call [phone] for an appointment        Information obtained by :  I understand that if any problems occur once I am at home I am to contact my physician. I understand and acknowledge receipt of the instructions indicated above.                                                                                                                                            Physician's or R.N.'s Signature                                                                  Date/Time                                                                                                                                              Patient or Representative Signature                                                          Date/Time

## 2019-02-15 NOTE — PROGRESS NOTES
Care Management Interventions PCP Verified by CM: Yes 
Palliative Care Criteria Met (RRAT>21 & CHF Dx)?: No 
Health Maintenance Reviewed: Yes Current Support Network: Own Home, Lives Alone Confirm Follow Up Transport: Family Plan discussed with Pt/Family/Caregiver: Yes Freedom of Choice Offered: Yes [employer] Information Provided?: No 
Discharge Location Discharge Placement: Home with family assistance Reason for Admission:   Abdominal pain RRAT Score:     30 Resources/supports as identified by patient/family:   Lives alone but has very supportive family. Granddaughter Yayo Heller, [phone]) is her primary caretaker and does everything for her. Top Challenges facing patient (as identified by patient/family and CM): Finances/Medication cost?     No concerns Transportation? Granddaughter to transport Support system or lack thereof? See above Living arrangements? See above Self-care/ADLs/Cognition? Alert and oriented x4 Current Advanced Directive/Advance Care Plan: On file Plan for utilizing home health:    Not needed Likelihood of readmission: low Transition of Care Plan:     Chart reviewed for transitions of care, discussed patient during rounds. Cm met with patient and her granddaughter to explain role and offer support. Demographics confirmed. Patient is discharging home today and has no needs at this time. Granddaughter to provide transport.  
Trenton Hopkins BSW, ACM

## 2019-02-17 PROBLEM — M54.12 CERVICAL RADICULOPATHY: Status: ACTIVE | Noted: 2019-02-17

## 2019-02-18 LAB
BACTERIA SPEC CULT: NORMAL
SERVICE CMNT-IMP: NORMAL

## 2019-02-18 NOTE — DISCHARGE SUMMARY
Fred[facility] Thomas 2906 SUMMARY    Name:  Yaneth Leone  MR#:  [id number]  :  08/10/1926  ACCOUNT #:  [de-identified]  ADMIT DATE:  2019  DISCHARGE DATE:  02/15/2019      DISCHARGE DIAGNOSES:  1. Acute sigmoid diverticulitis. 2.  Labile essential hypertension. 3.  Peripheral vascular disease. 4.  Cervical radiculopathy. DISCHARGE INSTRUCTIONS:  Flagyl 500 mg p.o. q.12h.; doxazosin 0.5 mg p.o. at bedtime;  amlodipine 5 mg p.o. b.i.d.; hydrocodone 5/325 p.o. p.r.n. neck pain; pantoprazole 40  mg p.o. daily; lovastatin 20 mg at bedtime; lisinopril 20 mg daily; align 4 mg daily;  amlodipine 2.5 mg p.o. daily as needed p.r.n. systolic blood pressure greater than  160; Narcan 2 mg nasal spray, one spray in each nostril as needed for overdose;  lorazepam 0.5 mg p.o. b.i.d. p.r.n.; sodium chloride 0.65% nasal spray, two sprays  both nostrils every 2 hours as needed for nasal congestion; ASA 81 mg daily; MiraLax  17 g in 8 ounces of water daily; flecainide 100 mg p.o. b.i.d.; Flonase two sprays  via both nostrils daily; loratadine 10 mg daily. Low-fiber diet. Gradually increase  activity. Call Dr. Cary Shore for increased abdominal pain or other acute changes in  health. Otherwise, follow up with Dr. Cary Shore in 1 week's time. CHIEF COMPLAINT:  A 63-year-old white female admitted with abdominal pain. HISTORY OF PRESENT ILLNESS:  She had diverticulitis in the past.  Three days ago, she  developed left lower quadrant abdominal pain associated with constipation. Her  family gave her glycerin suppositories, her bowels moved somewhat. Her abdominal  pain is improved but not resolved. The patient has also had in recent days some pain  in the left upper back, aggravated on my exam by neck extension, improved with a few  doses of prednisone from elsewhere. She came to my office, had rebound tenderness in  the left lower quadrant of her abdomen and was referred to the ER. There,  abdominopelvic CT with contrast revealed minimal sigmoid diverticulitis. I  reevaluated her in the emergency room. She continued to have left lower quadrant  abdominal pain and rebound tenderness on exam.  Accordingly, she is now admitted to  the hospital for an acute diverticulitis for IV fluids, IV metronidazole, and bowel  rest.    PAST MEDICAL HISTORY:  Severe protein-calorie malnutrition in the past; takotsubo  cardiomyopathy associated with NSTEMI in 08/2018; sick sinus syndrome; rhinitis;  peripheral vascular disease; paroxysmal atrial fibrillation; osteoporosis; left  bundle branch block; hypertension; hypercholesterolemia in 2011; cardiac  catheterization; nonobstructive coronary artery disease; episode of abdominal pain in  2011, this left lower quadrant pain was thought due to abdominal spasm; GERD;  moderate carotid artery stenosis, right greater than left; acute ischemic colitis  hospitalization. PAST SURGICAL HISTORY:  Colonoscopy in 2011 showing diverticulosis, cholecystectomy,  hysterectomy, left leg peripheral vascular stenting, tonsillectomy. MEDICATIONS:  Norvasc 2.5 mg p.o. daily p.r.n., systolic blood pressure at home  greater than 160; aspirin 81 mg daily; Norvasc 5 mg b.i.d.; align 4 mg daily;  flecainide 100 mg p.o. b.i.d.; Flonase two sprays each nostril as needed for  rhinitis; Norco 5/325 one p.o. b.i.d. p.r.n. neck pain; lisinopril 20 mg daily;  Claritin 10 mg daily as needed for rhinitis; lorazepam 0.5 mg p.o. b.i.d. p.r.n.  anxiety; lovastatin 20 mg at bedtime; Narcan 2 mg nasal spray p.r.n.; Protonix 40 mg  daily; MiraLax 17 g daily as needed; saline nasal spray p.r.n. nasal congestion. ADVERSE DRUG REACTION:  BETA BLOCKING AGENTS TO AVOID BECAUSE OF SICK SINUS SYNDROME,  AMOXICILLIN (NAUSEA AND VOMITING), CODEINE (NAUSEA), DONNATAL (UNKNOWN), EXELON  (NAUSEA), PNEUMOVAX 23  (ARM SWELLING), PLAVIX (RASH), SULAR(VERTIGO). FAMILY HISTORY:  Noncontributory.     SOCIAL HISTORY:  Negative for smoking. Negative EtOH. Full code status. She has a  supportive family who is present. REVIEW OF SYSTEMS:  Denies fever or chills, hematochezia, chest pain, dyspnea, or  palpitations,  disease. Left leg peripheral vascular stenting is scheduled for two  weeks' time. PHYSICAL EXAMINATION:  VITAL SIGNS:  Temperature 97.9, pulse 77, /72, RR 18, O2 saturation is 99%. GENERAL:  Elderly white female, appearing mildly uncomfortable. HEENT:  Negative. LUNGS:  Clear. HEART:  Regular, II/VI systolic murmur heard in the carotids. ABDOMEN:  BS positive. Left lower quadrant mild tenderness with rebound. EXTREMITIES:  2+ PT pulses bilaterally. NEUROLOGIC:  Grossly nonfocal.    LABORATORY DATA:  WBC 9.6, normal differential; hemoglobin 13.3; hematocrit 43.0;  platelet count is 318,218. Sodium 134, CO2 20, BUN 21, creatinine 0.9,  BUN/creatinine ratio 22. LFTs are normal.  Lipase is normal.  Lactic acid level 1.2. Troponin I less than 0.05. Blood cultures pending. EKG:  Sinus rhythm, first-degree  AV block, PVC, left bundle branch block, Q-wave inversion is more evident in the  lateral leads. Abdominopelvic CT scan with contrast, minimal acute diverticulitis of sigmoid  suspected. No fluid collection, obstruction, or free air. HOSPITAL COURSE:  The patient was admitted to remote telemetry; was placed on IV  fluids, on bowel rest and IV Flagyl. On this regimen, she made improvement. Her  bowels moved, her abdominal pain lessened, abdominal tenderness lessened. While  hospitalized, she was also felt to have cervical radiculopathy which she developed as  an outpatient. This too was improved with the analgesia that she was given. She was  ultimately discharged home in improved condition and will have followup as above.         Meghann Baez MD      [id number]  D:  02/17/2019 14:44  T:  02/18/2019 11:07  JOB #:  [id number]

## 2020-10-12 ENCOUNTER — HOSPITAL ENCOUNTER (OUTPATIENT)
Dept: GENERAL RADIOLOGY | Age: 85
Discharge: HOME | End: 2020-10-12
Attending: INTERNAL MEDICINE
Payer: MEDICARE

## 2020-10-12 DIAGNOSIS — M54.9 MID BACK PAIN: ICD-10-CM

## 2020-10-12 PROCEDURE — 72072 X-RAY EXAM THORAC SPINE 3VWS: CPT

## 2021-08-03 PROBLEM — R55 SYNCOPE: Status: RESOLVED | Noted: 2017-04-25 | Resolved: 2021-08-03

## 2022-03-18 PROBLEM — E43 UNSPECIFIED SEVERE PROTEIN-CALORIE MALNUTRITION (HCC): Status: ACTIVE | Noted: 2018-08-30

## 2022-03-18 PROBLEM — K52.9 ACUTE COLITIS: Status: ACTIVE | Noted: 2018-01-13

## 2022-03-18 PROBLEM — I49.3 PVCS (PREMATURE VENTRICULAR CONTRACTIONS): Status: ACTIVE | Noted: 2017-04-14

## 2022-03-19 PROBLEM — K59.09 OTHER CONSTIPATION: Status: ACTIVE | Noted: 2018-08-30

## 2022-03-19 PROBLEM — M54.12 CERVICAL RADICULOPATHY: Status: ACTIVE | Noted: 2019-02-17

## 2022-03-19 PROBLEM — I21.4 ACUTE NON-ST ELEVATION MYOCARDIAL INFARCTION (NSTEMI) (HCC): Status: ACTIVE | Noted: 2018-08-24

## 2022-03-19 PROBLEM — K57.92 DIVERTICULITIS: Status: ACTIVE | Noted: 2019-02-13

## 2022-03-19 PROBLEM — I50.41 CHF (CONGESTIVE HEART FAILURE), NYHA CLASS III, ACUTE, COMBINED (HCC): Status: ACTIVE | Noted: 2018-08-28

## 2022-03-19 PROBLEM — I51.81 TAKOTSUBO CARDIOMYOPATHY: Status: ACTIVE | Noted: 2018-08-23

## 2022-03-19 PROBLEM — I50.41 ACUTE COMBINED SYSTOLIC AND DIASTOLIC CHF, NYHA CLASS 3 (HCC): Status: ACTIVE | Noted: 2018-08-28

## 2022-12-07 NOTE — PROGRESS NOTES
Bedside and Verbal shift change report given to [id number] Dao (oncoming nurse) by SHON Jett RN (offgoing nurse). Report given with SBAR, Kardex, Intake/Output, MAR and Recent Results. [facility] NCCU Evening Rounds Note    Subjective: Patient seen and examined during evening rounds, discussed with bedside RN. Patient not endorsing any complaints, mouthing words but mute.    Objective: DHT placement in progress and difficult given hiatal hernia and gastric bypass, appears to be coiling     Vital Signs:    Visit Vitals  /66   Pulse 84   Temp 98.6 °F (37 °C) (Oral)   Resp 17   Ht 5' 5\" (1.651 m)   Wt 80.4 kg (177 lb 4 oz)   SpO2 100%   BMI 29.50 kg/m²     Physical Exam  Consitutional: no acute distress  HEENT: Left gaze preference, does not cross midline, right facial droop  CV: RRR, normal S1S2  Pulm: Lungs CTAB, non-labored respirations  Abd: soft, ND, +BS  Neuro: awake, alert, mute, perrla, left gaze preference and unable to cross midline, right facial droop, right hemibody flaccid, intermittent movement noted RLE to painful stimuli but not reproducible, increased tone RLE, LUE/LLE antigravity, spontaneous movement left hemibody and intermittently follows commands, +LLE clonus    Impression/Plan:  This is a 65 year-old female with history of hiatal hernia, s/p gastric bypass, PVCs/PACs, probable HTN, HLD, migraines, and GERD who is now admitted to NCCU with acute left IPH    Acute left subcortical IPH  Brain compression/perihematomal cerebral edema  -CT/CTA reviewed, negative for vascular anomaly  -SBP goal less than 140, currently requiring nicardipine infusion, unable to take po pending successful DHT placement  -NS on c/s, Dr. Otto, apprec recs  -repeat CTH this evening reviewed, demonstrating increased MLS, perihematomal edema, and slight increase in IPH  -will start 3% HTS q 6 hrs  -NA checks q 6 hrs with goal -150  -continue neuro exams q 1 hr  -continue to monitor serial CTs  -continue holding heparin subq pending stable CTH  -maintaining airway, will continue to follow closely    HTN urgency  -on nicardipine infusion, wean as able  -SBP goal less than 140  -start oral agents  pending enteral access  -add PRN hydralazine and labetolol    DOS 12/6/22    Critical care time is 40 minutes, excludes teaching, or separately billed procedures, or any concurrent time with other providers and was not duplicative services. I independently assessed and managed the patient's critical illness as documented, performing face-to-face patient care, including rendering the following critical care: Reviewed all the radiological studies available and their interpretation, reviewed  all the available labwork and all the pertinent consultant's notes. Patient is critically ill as documented above and requires high complexity medical decision making and active titration of therapies to preserve life. Dr. Garrison was available for collaboration during this encounter.     Milagros Morel, CNP, ACNP

## 2023-09-01 NOTE — ED TRIAGE NOTES
Triage note: Pt arrives via EMS with c/o LLQ abdominal pain and vomiting  x 1 day. Pt had one witnessed syncopal episode and possibly one unwitnessed syncopal episode. Hx diverticulitis and ischemic bowel. Negative

## 2024-12-16 NOTE — TELEPHONE ENCOUNTER
11/12/2018 Cardiac Rehab: Called Ms. Trinity Peck to discuss participation in the Cardiac Rehab Program. Spoke with patient's granddaughter, Ash Padgett who stated Zamudio Marc is doing much better and will definitely not want to do another exercise program\". Encouraged Ash Padgett to call back if patient would like to enroll at a later time.  Meghan Goodrich RN
Size Of Margin In Cm: 0.5
General